# Patient Record
Sex: MALE | Race: WHITE | NOT HISPANIC OR LATINO | Employment: FULL TIME | URBAN - METROPOLITAN AREA
[De-identification: names, ages, dates, MRNs, and addresses within clinical notes are randomized per-mention and may not be internally consistent; named-entity substitution may affect disease eponyms.]

---

## 2017-01-09 ENCOUNTER — TELEPHONE (OUTPATIENT)
Dept: FAMILY MEDICINE | Facility: CLINIC | Age: 62
End: 2017-01-09

## 2017-01-09 DIAGNOSIS — F41.9 ANXIETY: ICD-10-CM

## 2017-01-09 DIAGNOSIS — L21.9 SEBORRHEA: Primary | ICD-10-CM

## 2017-01-09 NOTE — TELEPHONE ENCOUNTER
Reason for Call:  Medication or medication refill:    Do you use a [facility]?  Name of the pharmacy and phone number for the current request: [facility]    Name of the medication requested: Ketoconazole 2 % cream    Other request: Please call when approved    Can we leave a detailed message on this number? YES    Phone number patient can be reached at: Home number on file [phone] (home)    Best Time: Any    Call taken on 1/9/2017 at 10:33 AM by Lori Duarte

## 2017-01-09 NOTE — TELEPHONE ENCOUNTER
FLUoxetine 20 MG tablet     Last Written Prescription Date: 10/17/16  Last Fill Quantity: 90, # refills: 0  Last Office Visit with [facility] primary care provider:  12/06/16        Last PHQ-9 score on record=   PHQ-9 SCORE 12/6/2016   Total Score -   Total Score 2           ketoconazole (NIZORAL) 2 % cream      Last Written Prescription Date: 10/17/12  Last Fill Quantity: 30g,  # refills: 1   Last Office Visit with [facility], [facility] or [facility] prescribing provider: 12/06/16      [facility]

## 2017-01-11 NOTE — TELEPHONE ENCOUNTER
Routing refill request to provider for review/approval because:  Drug interaction warning  A break in medication  Sandy Ridley RN

## 2017-01-12 NOTE — TELEPHONE ENCOUNTER
Pt checking status as this has been since Monday  He is asking you to address this today please    Give him a call when done  [phone]

## 2017-01-13 RX ORDER — FLUOXETINE 20 MG/1
TABLET, FILM COATED ORAL
Qty: 90 TABLET | Refills: 1 | Status: SHIPPED | OUTPATIENT
Start: 2017-01-13 | End: 2017-05-11

## 2017-01-13 RX ORDER — KETOCONAZOLE 20 MG/G
CREAM TOPICAL 2 TIMES DAILY PRN
Qty: 30 G | Refills: 1 | Status: SHIPPED | OUTPATIENT
Start: 2017-01-13 | End: 2019-10-29

## 2017-01-28 ENCOUNTER — OFFICE VISIT (OUTPATIENT)
Dept: URGENT CARE | Facility: URGENT CARE | Age: 62
End: 2017-01-28
Payer: COMMERCIAL

## 2017-01-28 VITALS
WEIGHT: 216 LBS | OXYGEN SATURATION: 94 % | TEMPERATURE: 98.3 F | BODY MASS INDEX: 30.14 KG/M2 | DIASTOLIC BLOOD PRESSURE: 75 MMHG | SYSTOLIC BLOOD PRESSURE: 124 MMHG | HEART RATE: 70 BPM

## 2017-01-28 DIAGNOSIS — R22.0 FACIAL SWELLING: Primary | ICD-10-CM

## 2017-01-28 LAB
ERYTHROCYTE [DISTWIDTH] IN BLOOD BY AUTOMATED COUNT: 13.1 % (ref 10–15)
HCT VFR BLD AUTO: 42.8 % (ref 40–53)
HGB BLD-MCNC: 14.3 G/DL (ref 13.3–17.7)
MCH RBC QN AUTO: 29.7 PG (ref 26.5–33)
MCHC RBC AUTO-ENTMCNC: 33.4 G/DL (ref 31.5–36.5)
MCV RBC AUTO: 89 FL (ref 78–100)
PLATELET # BLD AUTO: 245 10E9/L (ref 150–450)
RBC # BLD AUTO: 4.82 10E12/L (ref 4.4–5.9)
WBC # BLD AUTO: 6.5 10E9/L (ref 4–11)

## 2017-01-28 PROCEDURE — 85027 COMPLETE CBC AUTOMATED: CPT | Performed by: PHYSICIAN ASSISTANT

## 2017-01-28 PROCEDURE — 99213 OFFICE O/P EST LOW 20 MIN: CPT | Performed by: PHYSICIAN ASSISTANT

## 2017-01-28 PROCEDURE — 86735 MUMPS ANTIBODY: CPT | Mod: 90 | Performed by: PHYSICIAN ASSISTANT

## 2017-01-28 PROCEDURE — 99000 SPECIMEN HANDLING OFFICE-LAB: CPT | Performed by: PHYSICIAN ASSISTANT

## 2017-01-28 PROCEDURE — 36415 COLL VENOUS BLD VENIPUNCTURE: CPT | Performed by: PHYSICIAN ASSISTANT

## 2017-01-28 RX ORDER — CEPHALEXIN 500 MG/1
500 CAPSULE ORAL 3 TIMES DAILY
Qty: 30 CAPSULE | Refills: 0 | Status: SHIPPED | OUTPATIENT
Start: 2017-01-28 | End: 2017-02-07

## 2017-01-28 NOTE — LETTER
[facility]  [address]  [place] [address]         February 12, 2017    Grey Markham  [address]  [facility] [address]              Dear Grey,    The results of your recent tests were normal.  Enclosed is a copy of the results.  It was a pleasure to see you at your last visit.      Results for orders placed or performed in visit on 01/28/17   CBC with platelets   Result Value Ref Range    WBC 6.5 4.0 - 11.0 10e9/L    RBC Count 4.82 4.4 - 5.9 10e12/L    Hemoglobin 14.3 13.3 - 17.7 g/dL    Hematocrit 42.8 40.0 - 53.0 %    MCV 89 78 - 100 fl    MCH 29.7 26.5 - 33.0 pg    MCHC 33.4 31.5 - 36.5 g/dL    RDW 13.1 10.0 - 15.0 %    Platelet Count 245 150 - 450 10e9/L   Mumps antibody IgM   Result Value Ref Range    Mumps LETICIA IGM 0.06    Mumps Antibody IgG   Result Value Ref Range    Mumps Antibody IgG  0.0 - 0.8 AI     <0.2  Negative, suggests no immunologic exposure.   Antibody index (AI) values reflect qualitative changes in antibody   concentration that cannot be directly associated with clinical condition or   disease state.               If you have any questions or concerns, please call myself or my nurse at [phone]. Thanks for choosing [facility] for your services. Have a great day!      Sincerely,      Leonie Livingston PA-C/tanika

## 2017-01-28 NOTE — NURSING NOTE
"Chief Complaint   Patient presents with     Swelling     on both sides of face on jaw line and since Thursday       Initial /75 mmHg  Pulse 70  Temp(Src) 98.3  F (36.8  C) (Oral)  Wt 216 lb (97.977 kg)  SpO2 94% Estimated body mass index is 30.14 kg/(m^2) as calculated from the following:    Height as of 12/6/16: 5' 11\" (1.803 m).    Weight as of this encounter: 216 lb (97.977 kg).  BP completed using cuff size: hiram Monk CMA    "

## 2017-01-28 NOTE — PROGRESS NOTES
SUBJECTIVE:                                                    Grey Markham is a 61 year old male who presents to clinic today for the following health issues:      Swelling on face      Duration: since thursday    Description (location/character/radiation): down jaw to mid neck    Intensity:  moderate    Accompanying signs and symptoms: stuffed up feeling and headache    History (similar episodes/previous evaluation): None    Precipitating or alleviating factors: None    Therapies tried and outcome: hot wash cloth- didn't help     Acute onset of bilateral facial swelling along the jaw line and cough. Does not know if he had mumps as a child. We recently has positive mumps case at this clinic. No hives. No new meds. Eating more oranges than usual. No tooth pain    No tongue swelling. No difficulty swallowing.    Allergies   Allergen Reactions     Gemfibrozil Diarrhea     No Clinical Screening - See Comments      All glc drops except travatan     Pilocarpine Hydrochloride      Eye drops     Timoptic [Timolol Maleate]      Eye drops only       Past Medical History   Diagnosis Date     DDD (degenerative disc disease), lumbar      L-4-L5, L5-S1     Oral herpes      Glaucoma      Hyperlipidemia LDL goal <100      hyper-TG, on Simv 80mg since at least 1/4/10     GERD (gastroesophageal reflux disease) 11/2007     no Rose's     Coronary atherosclerosis of native coronary artery 12/30/09     10%  mid-LAD lesion, Dr. Mendoza     Glaucoma          Current Outpatient Prescriptions on File Prior to Visit:  FLUoxetine 20 MG tablet TAKE 1 TABLET (20 MG) BY MOUTH DAILY FOR ANXIETY PREVENTION.   ketoconazole (NIZORAL) 2 % cream Apply topically 2 times daily as needed (to seborrhea of face)   atorvastatin (LIPITOR) 80 MG tablet TAKE ONE TABLET BY MOUTH ONE TIME DAILY FOR CHOLESTEROL   pantoprazole (PROTONIX) 40 MG EC tablet TAKE ONE TABLET BY MOUTH ONE TIME DAILY FOR REFLUX   sildenafil (REVATIO/VIAGRA) 20 MG tablet Take 1-5  tablets ( mg) by mouth daily as needed , as directed, 1-3 hours before intimacy. Maximum 1 dose per 24 hours. Never use with nitroglycerin, terazosin or doxazosin.   sildenafil (REVATIO/VIAGRA) 100 MG cap/tab Take 1/4 - 1 tab, as directed, 1-3 hours before intimacy. Maximum 1 dose per 24 hours. Never use with nitroglycerin, terazosin or doxazosin.   valACYclovir (VALTREX) 500 MG tablet TAKE ONE TABLET BY MOUTH EVERY TWELVE HOURS FOR 10 DOSES. START AT EARLIEST ONSET OF SYMPTOMS   sulindac (CLINORIL) 150 MG tablet Take 1 tablet by mouth 2 times daily as needed. for wrist pain. Take with food.   aspirin 81 MG tablet Take 1 tablet by mouth daily. *     No current facility-administered medications on file prior to visit.    Social History   Substance Use Topics     Smoking status: Never Smoker      Smokeless tobacco: Never Used     Alcohol Use: Yes      Comment: weekends       ROS:  Consitutional: As above  ENT: As above  Respiratory: As above    OBJECTIVE:  /75 mmHg  Pulse 70  Temp(Src) 98.3  F (36.8  C) (Oral)  Wt 216 lb (97.977 kg)  SpO2 94%  GENERAL APPEARANCE: healthy, alert and no distress  EYES: conjunctiva clear  EARS: No cerumen.   Ear canals w/o erythema, TM's intact w/o erythema.    NOSE/MOUTH: Nose and mouth without ulcers, erythema or lesions  SINUSES: No maxillary sinus tenderness.  THROAT: Mild erythema w/o tonsillar enlargement . No exudates  NECK: supple,  no lymphadenopathy  RESP: lungs clear to auscultation - no rales, rhonchi or wheezes  CV: regular rates and rhythm, normal S1 S2, no murmur noted  NEURO: awake, alert    Face- bilateral parotid area swelling. Mildly tender, warm to touch, some mild skin redness. No tongue or pharyngeal swelling.    ASSESSMENT: Well appearing.    ICD-10-CM    1. Facial swelling R22.0 CBC with platelets     Mumps antibody IgM     Mumps Antibody IgG     PLAN: Unclear etiology. Cellulitis vs. Allergic reaction vs mumps. OTC Benadryl  Lots of rest and  fluids.  RTC if any worsening symptoms or if not improving.    Leonie Livingston PA-C

## 2017-01-30 LAB — MUV IGG SER QL IA: NORMAL AI (ref 0–0.8)

## 2017-02-09 LAB — MUV IGM SER IA-ACNC: 0.06

## 2017-02-14 RX ORDER — FLUOXETINE 20 MG/1
TABLET, FILM COATED ORAL
Qty: 90 TABLET | Refills: 0 | OUTPATIENT
Start: 2017-02-14

## 2017-02-14 NOTE — TELEPHONE ENCOUNTER
Disp Refills Start End ELHAM   FLUoxetine 20 MG tablet 90 tablet 1 1/13/2017  No   Sig: TAKE 1 TABLET (20 MG) BY MOUTH DAILY FOR ANXIETY PREVENTION.     Confirmation was received from [facility].  Patient should have 5 month left of medication.  Request denied,  Too soon to fill.    Sandy Ridley RN

## 2017-04-13 DIAGNOSIS — B00.2 HERPETIC GINGIVOSTOMATITIS: ICD-10-CM

## 2017-04-13 NOTE — TELEPHONE ENCOUNTER
valACYclovir (VALTREX) 500 MG tablet     Last Written Prescription Date: 06/20/16  Last Fill Quantity: 20, # refills: 2  Last Office Visit with [facility], P or [facility] prescribing provider: 12/06/16        Creatinine   Date Value Ref Range Status   08/19/2014 1.09 0.66 - 1.25 mg/dL Final         [facility]

## 2017-04-14 RX ORDER — VALACYCLOVIR HYDROCHLORIDE 500 MG/1
TABLET, FILM COATED ORAL
Qty: 20 TABLET | Refills: 2 | Status: SHIPPED | OUTPATIENT
Start: 2017-04-14 | End: 2018-02-23

## 2017-04-14 NOTE — TELEPHONE ENCOUNTER
Routing refill request to provider for review/approval because:  Labs not current:  Creatinine  Sandy Ridley RN

## 2017-05-01 ENCOUNTER — MYC MEDICAL ADVICE (OUTPATIENT)
Dept: FAMILY MEDICINE | Facility: CLINIC | Age: 62
End: 2017-05-01

## 2017-05-01 DIAGNOSIS — N52.03 COMBINED ARTERIAL INSUFFICIENCY AND CORPORO-VENOUS OCCLUSIVE ERECTILE DYSFUNCTION: ICD-10-CM

## 2017-05-02 ENCOUNTER — MYC MEDICAL ADVICE (OUTPATIENT)
Dept: FAMILY MEDICINE | Facility: CLINIC | Age: 62
End: 2017-05-02

## 2017-05-04 RX ORDER — SILDENAFIL CITRATE 20 MG/1
TABLET ORAL
Qty: 100 TABLET | Refills: 5 | Status: SHIPPED | OUTPATIENT
Start: 2017-05-04 | End: 2018-06-01

## 2017-05-05 DIAGNOSIS — N52.03 COMBINED ARTERIAL INSUFFICIENCY AND CORPORO-VENOUS OCCLUSIVE ERECTILE DYSFUNCTION: ICD-10-CM

## 2017-05-11 ENCOUNTER — TELEPHONE (OUTPATIENT)
Dept: FAMILY MEDICINE | Facility: CLINIC | Age: 62
End: 2017-05-11

## 2017-05-11 DIAGNOSIS — F41.9 ANXIETY: ICD-10-CM

## 2017-05-11 NOTE — TELEPHONE ENCOUNTER
FLUoxetine 20 MG tablet     Last Written Prescription Date: 1/13/17  Last Fill Quantity: 90, # refills: 1  Last Office Visit with [facility] primary care provider:  12/6/16        Last PHQ-9 score on record=   PHQ-9 SCORE 12/6/2016   Total Score -   Total Score 2

## 2017-05-17 RX ORDER — FLUOXETINE 20 MG/1
TABLET, FILM COATED ORAL
Qty: 90 TABLET | Refills: 0 | Status: SHIPPED | OUTPATIENT
Start: 2017-05-17 | End: 2017-06-06

## 2017-05-17 NOTE — TELEPHONE ENCOUNTER
Called left msg for pt rx has been filled, pt is reminded he is due for a follow up next month, June. Pt may call our appt line to schedule the appt. Pt may want to schedule a ARYA if he has not had one this year.  Ramon Dickerson,  [facility]

## 2017-06-06 ENCOUNTER — OFFICE VISIT (OUTPATIENT)
Dept: FAMILY MEDICINE | Facility: CLINIC | Age: 62
End: 2017-06-06
Payer: COMMERCIAL

## 2017-06-06 VITALS
HEIGHT: 71 IN | OXYGEN SATURATION: 95 % | SYSTOLIC BLOOD PRESSURE: 135 MMHG | HEART RATE: 66 BPM | TEMPERATURE: 97.9 F | BODY MASS INDEX: 30.52 KG/M2 | DIASTOLIC BLOOD PRESSURE: 89 MMHG | WEIGHT: 218 LBS

## 2017-06-06 DIAGNOSIS — F41.9 ANXIETY: ICD-10-CM

## 2017-06-06 DIAGNOSIS — I25.10 ATHEROSCLEROSIS OF NATIVE CORONARY ARTERY OF NATIVE HEART WITHOUT ANGINA PECTORIS: ICD-10-CM

## 2017-06-06 DIAGNOSIS — E78.5 HYPERLIPIDEMIA LDL GOAL <100: ICD-10-CM

## 2017-06-06 DIAGNOSIS — Z00.00 ENCOUNTER FOR ROUTINE ADULT HEALTH EXAMINATION WITHOUT ABNORMAL FINDINGS: Primary | ICD-10-CM

## 2017-06-06 DIAGNOSIS — K21.9 GASTROESOPHAGEAL REFLUX DISEASE WITHOUT ESOPHAGITIS: ICD-10-CM

## 2017-06-06 LAB
ALT SERPL W P-5'-P-CCNC: 38 U/L (ref 0–70)
CHOLEST SERPL-MCNC: 176 MG/DL
HDLC SERPL-MCNC: 61 MG/DL
LDLC SERPL CALC-MCNC: 53 MG/DL
NONHDLC SERPL-MCNC: 115 MG/DL
TRIGL SERPL-MCNC: 312 MG/DL

## 2017-06-06 PROCEDURE — 36415 COLL VENOUS BLD VENIPUNCTURE: CPT | Performed by: FAMILY MEDICINE

## 2017-06-06 PROCEDURE — 80061 LIPID PANEL: CPT | Performed by: FAMILY MEDICINE

## 2017-06-06 PROCEDURE — 99396 PREV VISIT EST AGE 40-64: CPT | Performed by: FAMILY MEDICINE

## 2017-06-06 PROCEDURE — 84460 ALANINE AMINO (ALT) (SGPT): CPT | Performed by: FAMILY MEDICINE

## 2017-06-06 RX ORDER — ATORVASTATIN CALCIUM 80 MG/1
TABLET, FILM COATED ORAL
Qty: 90 TABLET | Refills: 1 | Status: SHIPPED | OUTPATIENT
Start: 2017-06-06 | End: 2017-11-27

## 2017-06-06 RX ORDER — FLUOXETINE 20 MG/1
TABLET, FILM COATED ORAL
Qty: 90 TABLET | Refills: 1 | Status: SHIPPED | OUTPATIENT
Start: 2017-06-06 | End: 2018-06-01

## 2017-06-06 NOTE — NURSING NOTE
"Chief Complaint   Patient presents with     Physical       Initial /89 (BP Location: Left arm, Patient Position: Chair, Cuff Size: Adult Regular)  Pulse 66  Temp 97.9  F (36.6  C) (Oral)  Ht 5' 11\" (1.803 m)  Wt 218 lb (98.9 kg)  SpO2 95%  BMI 30.4 kg/m2 Estimated body mass index is 30.4 kg/(m^2) as calculated from the following:    Height as of this encounter: 5' 11\" (1.803 m).    Weight as of this encounter: 218 lb (98.9 kg).  Medication Reconciliation: complete     Pa Ryan Valadez MA      "

## 2017-06-06 NOTE — MR AVS SNAPSHOT
After Visit Summary   6/6/2017    Grey Markham    MRN: [id number]           Patient Information     Date Of Birth          1955        Visit Information        Provider Department      6/6/2017 7:20 AM Navin Euceda MD [facility]        Today's Diagnoses     Encounter for routine adult health examination without abnormal findings    -  1    Atherosclerosis of native coronary artery of native heart without angina pectoris        Hyperlipidemia LDL goal <100        Gastroesophageal reflux disease without esophagitis        Elevated prostate specific antigen (PSA)        Combined arterial insufficiency and corporo-venous occlusive erectile dysfunction        Anxiety          Care Instructions      Preventive Health Recommendations  Male Ages 50 - 64    Yearly exam:             See your health care provider every year in order to  o   Review health changes.   o   Discuss preventive care.    o   Review your medicines if your doctor has prescribed any.     Have a cholesterol test every 5 years, or more frequently if you are at risk for high cholesterol/heart disease.     Have a diabetes test (fasting glucose) every three years. If you are at risk for diabetes, you should have this test more often.     Have a colonoscopy at age 50, or have a yearly FIT test (stool test). These exams will check for colon cancer.      Talk with your health care provider about whether or not a prostate cancer screening test (PSA) is right for you.    You should be tested each year for STDs (sexually transmitted diseases), if you re at risk.     Shots: Get a flu shot each year. Get a tetanus shot every 10 years.     Nutrition:    Eat at least 5 servings of fruits and vegetables daily.     Eat whole-grain bread, whole-wheat pasta and brown rice instead of white grains and rice.     Talk to your provider about Calcium and Vitamin D.     Lifestyle    Exercise for at least 150 minutes a week (30  minutes a day, 5 days a week). This will help you control your weight and prevent disease.     Limit alcohol to one drink per day.     No smoking.     Wear sunscreen to prevent skin cancer.     See your dentist every six months for an exam and cleaning.     See your eye doctor every 1 to 2 years.  This summary includes the important diagnoses, test, medications and other important parts of your medical history.  Below are a few good we sites you can use to learn more about these.     Www.[employer].org : Up to date and easily searchable information on multiple topics.  Www.[employer].Techpoint/Pharmacy/c_539084.asp : [facility] $4.99 medications  Www.BabyFirstTV.gov : medication info, interactive tutorials, watch real surgeries online  Www.familydoctor.org : good info from the Academy of Family Physicians  Www.Luv Rink.Bandcamp : good info from the [facility]  Www.cdc.gov : public health info, travel advisories, epidemics (H1N1)  Www.aap.org : children's health info, normal development, vaccinations  Www.health.[facility].[place].us : [place] dept of heatlh, public health issues in [place], N1N1    Based on your medical history and these are the current health maintenance or preventive care services that you are due for (some may have been done at this visit:)  There are no preventive care reminders to display for this patient.  =================================================================================  Normal Values   Blood pressure  <140/90 for most adults    <130/80 for some chronic diseases (ask your care team about yours)    BMI (body mass index)  18.5-25 kg/m2 (based on height and weight)     Thank you for visiting [facility]    Normal or non-critical lab and imaging results will be communicated to you by MyChart, letter or phone within 7 days.  If you do not hear from us within 10 days, please call the clinic. If you have a critical or abnormal lab result, we will notify you by phone as soon as possible.      If you have any questions regarding your visit please contact:     Team Comfort:   Clinic Hours Telephone Number   Dr. Navin Cardenas   7am-5pm  Monday - Friday [phone]  [facility]   Pharmacy 8am-8pm Monday-Thursday      8am-6pm Friday  9am-5pm Saturday-Sunday [phone]   Urgent Care 11am-8pm Monday-Friday        9am-5pm Saturday-Sunday [phone]     After hours, weekend or if you need to make an appointment with your primary provider please call [phone].   After Hours nurse advise: call [facility]: [phone]    Medication Refills:  Call your pharmacy and they will forward the refill to us. Please allow 3 business days for your refills to be completed.    Use [email or web address] (secure email communication and access to your chart) to send your primary care provider a message or make an appointment. Ask someone on your Team how to sign up for [email or web address]. To log on to Pixelligent or for more information in Fin[employer] please visit the website at [email or web address].  As of October 8, 2013, all password changes, disabled accounts, or ID changes in [email or web address] will be done by our Access Services Department.   If you need help with your account or password, call: [phone]. Clinic staff no longer has the ability to change passwords.             Follow-ups after your visit        Additional Services     Can Do Program Referral       CAN DO is different from other healthy lifestyle and weight loss programs. CAN DO personal health coaches support you in clarifying what you want for yourself and your well-being. Together we create a roadmap to guide your journey to thriving well-being - one step at a time - for lasting success. Working under the leadership of a physician medical director, our health coaching team includes a doctorate -level prepared expert in health management, a physician and an exercise specialist.  We support you in identifying what is important to you, setting meaningful goals and addressing barriers to success. Together we celestina your progress over time. CAN DO has helped hundreds reach their goals: from people who just want to be healthier and enjoy more energy, to those who want to lose or gain weight.            GASTROENTEROLOGY ADULT REF PROCEDURE ONLY       Last Lab Result: Creatinine (mg/dL)       Date                     Value                 08/19/2014               1.09             ----------  Body mass index is 30.4 kg/(m^2).     Needed:  No  Language:  English    Patient will be contacted to schedule procedure.     Please be aware that coverage of these services is subject to the terms and limitations of your health insurance plan.  Call member services at your health plan with any benefit or coverage questions.  Any procedures must be performed at a [place] facility OR coordinated by your clinic's referral office.    Please bring the following with you to your appointment:    (1) Any X-Rays, CTs or MRIs which have been performed.  Contact the facility where they were done to arrange for  prior to your scheduled appointment.    (2) List of current medications   (3) This referral request   (4) Any documents/labs given to you for this referral                  Follow-up notes from your care team     Return in about 6 months (around 12/6/2017) for cholesterol, lab tests, recheck medications.      Who to contact     If you have questions or need follow up information about today's clinic visit or your schedule please contact [facility] [place] directly at [phone].  Normal or non-critical lab and imaging results will be communicated to you by MyChart, letter or phone within 4 business days after the clinic has received the results. If you do not hear from us within 7 days, please contact the clinic through MyChart or phone. If you have a critical or abnormal lab  "result, we will notify you by phone as soon as possible.  Submit refill requests through goodideazs or call your pharmacy and they will forward the refill request to us. Please allow 3 business days for your refill to be completed.          Additional Information About Your Visit        [employer] Information     goodideazs gives you secure access to your electronic health record. If you see a primary care provider, you can also send messages to your care team and make appointments. If you have questions, please call your primary care clinic.  If you do not have a primary care provider, please call [phone] and they will assist you.        Care EveryWhere ID     This is your Care EveryWhere ID. This could be used by other organizations to access your [place] medical records  [id number]        Your Vitals Were     Pulse Temperature Height Pulse Oximetry BMI (Body Mass Index)       66 97.9  F (36.6  C) (Oral) 5' 11\" (1.803 m) 95% 30.4 kg/m2        Blood Pressure from Last 3 Encounters:   06/06/17 135/89   01/28/17 124/75   12/06/16 130/78    Weight from Last 3 Encounters:   06/06/17 218 lb (98.9 kg)   01/28/17 216 lb (98 kg)   12/06/16 220 lb (99.8 kg)              We Performed the Following     ALT     Can Do Program Referral     GASTROENTEROLOGY ADULT REF PROCEDURE ONLY     Lipid panel reflex to direct LDL          Today's Medication Changes          These changes are accurate as of: 6/6/17  8:15 AM.  If you have any questions, ask your nurse or doctor.               These medicines have changed or have updated prescriptions.        Dose/Directions    FLUoxetine 20 MG tablet   This may have changed:  See the new instructions.   Used for:  Anxiety   Changed by:  Navin Euceda MD        TAKE ONE TABLET BY MOUTH ONE TIME DAILY FOR ANXIETY PREVENTION   Quantity:  90 tablet   Refills:  1            Where to get your medicines      These medications were sent to [place] [address] [place] [facility] [place] [place] - [address] [facility]  " [address] [place] [address]     Phone:  [phone]     atorvastatin 80 MG tablet    FLUoxetine 20 MG tablet                Primary Care Provider Office Phone # Fax #    Navin Euceda [phone]       [facility] [address]  [facility] [address]        Thank you!     Thank you for choosing [facility]  for your care. Our goal is always to provide you with excellent care. Hearing back from our patients is one way we can continue to improve our services. Please take a few minutes to complete the written survey that you may receive in the mail after your visit with us. Thank you!             Your Updated Medication List - Protect others around you: Learn how to safely use, store and throw away your medicines at www.disposemymeds.org.          This list is accurate as of: 6/6/17  8:15 AM.  Always use your most recent med list.                   Brand Name Dispense Instructions for use    aspirin 81 MG tablet     100 tablet    Take 1 tablet by mouth daily. *       atorvastatin 80 MG tablet    LIPITOR    90 tablet    TAKE ONE TABLET BY MOUTH ONE TIME DAILY FOR CHOLESTEROL       FLUoxetine 20 MG tablet     90 tablet    TAKE ONE TABLET BY MOUTH ONE TIME DAILY FOR ANXIETY PREVENTION       ketoconazole 2 % cream    NIZORAL    30 g    Apply topically 2 times daily as needed (to seborrhea of face)       pantoprazole 40 MG EC tablet    PROTONIX    90 tablet    TAKE ONE TABLET BY MOUTH ONE TIME DAILY FOR REFLUX       sildenafil 20 MG tablet    REVATIO/VIAGRA    100 tablet    1-5 tabs ( mg) as dir.,1-3 h before intimacy, daily as needed. Max 1 dose per 24 h. Never use w/ nitroglycerin, terazosin or doxazosin       sulindac 150 MG tablet    CLINORIL    180 tablet    Take 1 tablet by mouth 2 times daily as needed. for wrist pain. Take with food.       valACYclovir 500 MG tablet    VALTREX    20 tablet    TAKE ONE TABLET BY MOUTH EVERY TWELVE HOURS FOR 5  DAYS. START AT EARLIEST ONSET OF SYMPTOMS

## 2017-06-06 NOTE — PROGRESS NOTES
SUBJECTIVE:     CC: Grey Markham is an 61 year old male who presents for preventative health visit.     Healthy Habits:    Do you get at least three servings of calcium containing foods daily (dairy, green leafy vegetables, etc.)? yes    Amount of exercise or daily activities, outside of work: 5 day(s) per week    Problems taking medications regularly No    Medication side effects: No    Have you had an eye exam in the past two years? yes    Do you see a dentist twice per year? yes    Do you have sleep apnea, excessive snoring or daytime drowsiness?yes snoring    Patient returns today with stomach acid problems. He has feelings of burping but finds himself unable to. Onset: 4 days. He did notice some chest pain last night, however has had no other episodes. Patient takes reflux medicine. 10 + years since last checking of upper endoscopy.     Daily exercise: Patient does weights at home, rarely does cardio.   -------------------------------------    Today's PHQ-2 Score:   PHQ-2 ( 1999 [employer]) 6/6/2017 12/6/2016   Q1: Little interest or pleasure in doing things 0 0   Q2: Feeling down, depressed or hopeless 0 0   PHQ-2 Score 0 0       Abuse: Current or Past(Physical, Sexual or Emotional)- No  Do you feel safe in your environment - Yes    Social History   Substance Use Topics     Smoking status: Never Smoker     Smokeless tobacco: Never Used     Alcohol use Yes      Comment: weekends     The patient does not drink >3 drinks per day nor >7 drinks per week.    Last PSA:   PSA   Date Value Ref Range Status   05/17/2016 11.56 (H) 0 - 4 ug/L Final       Recent Labs   Lab Test  12/06/16   0837  05/17/16   0751  08/21/15   0731  08/19/14   0822   CHOL  166  140  177  209*   HDL  64  49  49  56   LDL  51  30  59  Cannot estimate LDL when triglyceride exceeds 400 mg/dL  91   TRIG  257*  306*  347*  501*   CHOLHDLRATIO   --    --   3.6  3.7   NHDL  102  91   --    --          ROS:  C: NEGATIVE for fever, chills, change in  "weight  I: NEGATIVE for worrisome rashes, moles or lesions  E: NEGATIVE for vision changes or irritation  ENT: NEGATIVE for ear, mouth and throat problems  R: NEGATIVE for significant cough or SOB  CV: NEGATIVE for chest pain, palpitations or peripheral edema  GI: NEGATIVE for nausea or change in bowel habits. See GI discussion above.   male: negative for dysuria, hematuria, decreased urinary stream, erectile dysfunction, urethral discharge  M: NEGATIVE for significant arthralgias or myalgia  N: NEGATIVE for weakness, dizziness or paresthesias  P: NEGATIVE for changes in mood or affect    Problem list, Medication list, Allergies, and Medical/Social/Surgical histories reviewed in [facility] and updated as appropriate.    This document serves as a record of the services and decisions personally performed and made by Navin Euceda MD. It was created on his behalf by Senia Hinson, a trained medical scribe. The creation of this document is based the provider's statements to the medical scribe.    Scribe Senia Hinson 8:23 AM 6/6/2017     OBJECTIVE:     /89 (BP Location: Left arm, Patient Position: Chair, Cuff Size: Adult Regular)  Pulse 66  Temp 97.9  F (36.6  C) (Oral)  Ht 1.803 m (5' 11\")  Wt 98.9 kg (218 lb)  SpO2 95%  BMI 30.4 kg/m2  EXAM:  GENERAL: healthy, alert and no distress  EYES: Eyes grossly normal to inspection, PERRL and conjunctivae and sclerae normal  HENT: ear canals and TM's normal, nose and mouth without ulcers or lesions  NECK: no adenopathy, no asymmetry, masses, or scars and thyroid normal to palpation  RESP: lungs clear to auscultation - no rales, rhonchi or wheezes  CV: regular rate and rhythm, normal S1 S2, no S3 or S4, no murmur, click or rub, no peripheral edema and peripheral pulses strong  ABDOMEN: soft, nontender, no hepatosplenomegaly, no masses and bowel sounds normal   (male): normal male genitalia without lesions or urethral discharge, no hernia  MS: no gross musculoskeletal " "defects noted, no edema  SKIN: no suspicious lesions or rashes  NEURO: Normal strength and tone, mentation intact and speech normal  PSYCH: mentation appears normal, affect normal/bright        ASSESSMENT/PLAN:     (Z00.00) Encounter for routine adult health examination without abnormal findings  (primary encounter diagnosis)  Comment: Negative screening exam. Up to date on preventive services.  Plan: Can Do Program Referral        Follow up in 1 year.    (I25.10) Atherosclerosis of native coronary artery of native heart without angina pectoris  Comment: Clinically stable.  Plan: atorvastatin (LIPITOR) 80 MG tablet, Lipid         panel reflex to direct LDL, ALT    (E78.5) Hyperlipidemia LDL goal <100  Comment: Fasting.  Plan: atorvastatin (LIPITOR) 80 MG tablet, Lipid         panel reflex to direct LDL, ALT        Follow up in 6 months.    (K21.9) Gastroesophageal reflux disease without esophagitis  Comment: Worsening of symptoms and lower esophageal dysphagia despite maximal GERD treatment.  Plan: GASTROENTEROLOGY ADULT REF PROCEDURE ONLY    (F41.9) Anxiety  Comment: Prescription update.  Plan: FLUoxetine 20 MG tablet        Monitor periodically.      COUNSELING:  Reviewed preventive health counseling, as reflected in patient instructions     reports that he has never smoked. He has never used smokeless tobacco.    Estimated body mass index is 30.4 kg/(m^2) as calculated from the following:    Height as of this encounter: 1.803 m (5' 11\").    Weight as of this encounter: 98.9 kg (218 lb).   Weight management plan: Discussed healthy diet and exercise guidelines and patient will follow up in 12 months in clinic to re-evaluate. Patient admits he needs to resume the cardio component of his exercise regimen.    Counseling Resources:  ATP IV Guidelines  Pooled Cohorts Equation Calculator  FRAX Risk Assessment  ICSI Preventive Guidelines  Dietary Guidelines for Americans, 2010  USDA's MyPlate  ASA Prophylaxis  Lung CA " Screening    The information in this document, created by a scribe for me, accurately reflects the services I personally performed and the decisions made by me. I have reviewed and approved this document for accuracy.      Navin Euceda MD  [facility]

## 2017-06-06 NOTE — PATIENT INSTRUCTIONS
Preventive Health Recommendations  Male Ages 50   64    Yearly exam:             See your health care provider every year in order to  o   Review health changes.   o   Discuss preventive care.    o   Review your medicines if your doctor has prescribed any.     Have a cholesterol test every 5 years, or more frequently if you are at risk for high cholesterol/heart disease.     Have a diabetes test (fasting glucose) every three years. If you are at risk for diabetes, you should have this test more often.     Have a colonoscopy at age 50, or have a yearly FIT test (stool test). These exams will check for colon cancer.      Talk with your health care provider about whether or not a prostate cancer screening test (PSA) is right for you.    You should be tested each year for STDs (sexually transmitted diseases), if you re at risk.     Shots: Get a flu shot each year. Get a tetanus shot every 10 years.     Nutrition:    Eat at least 5 servings of fruits and vegetables daily.     Eat whole-grain bread, whole-wheat pasta and brown rice instead of white grains and rice.     Talk to your provider about Calcium and Vitamin D.     Lifestyle    Exercise for at least 150 minutes a week (30 minutes a day, 5 days a week). This will help you control your weight and prevent disease.     Limit alcohol to one drink per day.     No smoking.     Wear sunscreen to prevent skin cancer.     See your dentist every six months for an exam and cleaning.     See your eye doctor every 1 to 2 years.  This summary includes the important diagnoses, test, medications and other important parts of your medical history.  Below are a few good we sites you can use to learn more about these.     Www.[employer].org : Up to date and easily searchable information on multiple topics.  Www.[employer].org/Pharmacy/c_539084.asp : [employer] [facility] $4.99 medications  Www.Mindscape.gov : medication info, interactive tutorials, watch real surgeries  online  Www.familydoctor.org : good info from the Academy of Family Physicians  Www.mayoclinic.com : good info from the [facility]  Www.cdc.gov : public health info, travel advisories, epidemics (H1N1)  Www.aap.org : children's health info, normal development, vaccinations  Www.health.[facility].[place].us : [place] dept of [facility], public health issues in [place], N1N1    Based on your medical history and these are the current health maintenance or preventive care services that you are due for (some may have been done at this visit:)  There are no preventive care reminders to display for this patient.  =================================================================================  Normal Values   Blood pressure  <140/90 for most adults    <130/80 for some chronic diseases (ask your care team about yours)    BMI (body mass index)  18.5-25 kg/m2 (based on height and weight)     Thank you for visiting [facility]    Normal or non-critical lab and imaging results will be communicated to you by MyChart, letter or phone within 7 days.  If you do not hear from us within 10 days, please call the clinic. If you have a critical or abnormal lab result, we will notify you by phone as soon as possible.     If you have any questions regarding your visit please contact:     Team Comfort:   Clinic Hours Telephone Number   Dr. Navin Cardenas   7am-5pm  Monday - Friday [phone]  [facility]   Pharmacy 8am-8pm Monday-Thursday      8am-6pm Friday  9am-5pm Saturday-Sunday [phone]   Urgent Care 11am-8pm Monday-Friday        9am-5pm Saturday-Sunday [phone]     After hours, weekend or if you need to make an appointment with your primary provider please call [phone].   After Hours nurse advise: call [facility]: [phone]    Medication Refills:  Call your pharmacy and they will forward the refill to us. Please allow 3 business  days for your refills to be completed.    Use Timetovisit (secure email communication and access to your chart) to send your primary care provider a message or make an appointment. Ask someone on your Team how to sign up for [employer]etovisit. To log on to [employer] or for more information in [employer] please visit the website at [email or web address].  As of October 8, 2013, all password changes, disabled accounts, or ID changes in Timetovisit/MyHealth will be done by our Access Services Department.   If you need help with your account or password, call: [phone]. Clinic staff no longer has the ability to change passwords.

## 2017-06-14 ENCOUNTER — SURGERY (OUTPATIENT)
Age: 62
End: 2017-06-14

## 2017-06-14 ENCOUNTER — HOSPITAL ENCOUNTER (OUTPATIENT)
Facility: AMBULATORY SURGERY CENTER | Age: 62
Discharge: HOME | End: 2017-06-14
Attending: SURGERY | Admitting: SURGERY
Payer: COMMERCIAL

## 2017-06-14 VITALS
DIASTOLIC BLOOD PRESSURE: 69 MMHG | SYSTOLIC BLOOD PRESSURE: 131 MMHG | TEMPERATURE: 97.4 F | OXYGEN SATURATION: 96 % | HEIGHT: 71 IN | WEIGHT: 218 LBS | BODY MASS INDEX: 30.52 KG/M2 | RESPIRATION RATE: 16 BRPM

## 2017-06-14 LAB — UPPER GI ENDOSCOPY: NORMAL

## 2017-06-14 PROCEDURE — 43239 EGD BIOPSY SINGLE/MULTIPLE: CPT | Performed by: SURGERY

## 2017-06-14 PROCEDURE — 88305 TISSUE EXAM BY PATHOLOGIST: CPT | Performed by: SURGERY

## 2017-06-14 PROCEDURE — G8918 PT W/O PREOP ORDER IV AB PRO: HCPCS

## 2017-06-14 PROCEDURE — G8907 PT DOC NO EVENTS ON DISCHARG: HCPCS

## 2017-06-14 PROCEDURE — 43239 EGD BIOPSY SINGLE/MULTIPLE: CPT

## 2017-06-14 PROCEDURE — 88342 IMHCHEM/IMCYTCHM 1ST ANTB: CPT | Performed by: SURGERY

## 2017-06-14 RX ORDER — LIDOCAINE 40 MG/G
CREAM TOPICAL
Status: DISCONTINUED | OUTPATIENT
Start: 2017-06-14 | End: 2017-06-15 | Stop reason: HOSPADM

## 2017-06-14 RX ORDER — ONDANSETRON 2 MG/ML
4 INJECTION INTRAMUSCULAR; INTRAVENOUS
Status: DISCONTINUED | OUTPATIENT
Start: 2017-06-14 | End: 2017-06-15 | Stop reason: HOSPADM

## 2017-06-14 RX ORDER — FENTANYL CITRATE 50 UG/ML
INJECTION, SOLUTION INTRAMUSCULAR; INTRAVENOUS PRN
Status: DISCONTINUED | OUTPATIENT
Start: 2017-06-14 | End: 2017-06-14 | Stop reason: HOSPADM

## 2017-06-14 RX ADMIN — FENTANYL CITRATE 100 MCG: 50 INJECTION, SOLUTION INTRAMUSCULAR; INTRAVENOUS at 10:27

## 2017-06-15 NOTE — PROGRESS NOTES
Dear Grey,     I am covering for the provider who ordered this test.  Your test results are attached.   Your endoscopy shows a small hiatal hernia.  The physician performing the procedure will let you know about the biopsy results when they process.    Please notify me via TheFriendMail or contact the clinic at [phone] if you have any questions.    Kylah Bowen MD

## 2017-06-16 LAB — COPATH REPORT: NORMAL

## 2017-06-18 DIAGNOSIS — E78.5 HYPERLIPIDEMIA LDL GOAL <100: ICD-10-CM

## 2017-06-18 DIAGNOSIS — I25.10 ATHEROSCLEROSIS OF NATIVE CORONARY ARTERY OF NATIVE HEART WITHOUT ANGINA PECTORIS: ICD-10-CM

## 2017-06-18 RX ORDER — ATORVASTATIN CALCIUM 80 MG/1
TABLET, FILM COATED ORAL
Qty: 90 TABLET | Refills: 1 | Status: CANCELLED | OUTPATIENT
Start: 2017-06-18

## 2017-06-18 NOTE — TELEPHONE ENCOUNTER
atorvastatin (LIPITOR) 80 MG tablet     Last Written Prescription Date: 6/6/17  Last Fill Quantity: 90, # refills: 1  Last Office Visit with [facility], [facility] or [facility] prescribing provider: 12/6/16       Lab Results   Component Value Date    CHOL 176 06/06/2017     Lab Results   Component Value Date    HDL 61 06/06/2017     Lab Results   Component Value Date    LDL 53 06/06/2017     Lab Results   Component Value Date    TRIG 312 06/06/2017     Lab Results   Component Value Date    CHOLHDLRATIO 3.6 08/21/2015         [facility]

## 2017-06-20 NOTE — TELEPHONE ENCOUNTER
The following prescription was sent to [place], [place]:    atorvastatin (LIPITOR) 80 MG tablet 90 tablet 1 6/6/2017  No   Sig: TAKE ONE TABLET BY MOUTH ONE TIME DAILY FOR CHOLESTEROL   Class: E-Prescribe   Order: [id number]   E-Prescribing Status: Receipt confirmed by pharmacy (6/6/2017  8:14 AM CDT)       Request denied.  Too soon to fill.    Sandy Ridley RN

## 2017-08-17 DIAGNOSIS — F41.9 ANXIETY: ICD-10-CM

## 2017-08-17 RX ORDER — FLUOXETINE 20 MG/1
TABLET, FILM COATED ORAL
Qty: 90 TABLET | Refills: 0 | OUTPATIENT
Start: 2017-08-17

## 2017-08-17 NOTE — TELEPHONE ENCOUNTER
The following prescription was sent to [facility] [place]:      FLUoxetine 20 MG tablet 90 tablet 1 6/6/2017  No   Sig: TAKE ONE TABLET BY MOUTH ONE TIME DAILY FOR ANXIETY PREVENTION   Class: E-Prescribe   Order: [id number]   E-Prescribing Status: Receipt confirmed by pharmacy (6/6/2017  8:14 AM CDT)       Request denied.  Too soon to fill.  Sandy Ridley RN

## 2017-09-12 DIAGNOSIS — F41.9 ANXIETY: ICD-10-CM

## 2017-09-12 DIAGNOSIS — I25.10 ATHEROSCLEROSIS OF NATIVE CORONARY ARTERY OF NATIVE HEART WITHOUT ANGINA PECTORIS: ICD-10-CM

## 2017-09-12 DIAGNOSIS — E78.5 HYPERLIPIDEMIA LDL GOAL <100: ICD-10-CM

## 2017-09-13 NOTE — TELEPHONE ENCOUNTER
These should both have 1 refill available at this pharmacy.      Ángela Vicente  [facility]

## 2017-09-14 RX ORDER — ATORVASTATIN CALCIUM 80 MG/1
TABLET, FILM COATED ORAL
Qty: 90 TABLET | Refills: 1 | OUTPATIENT
Start: 2017-09-14

## 2017-09-14 RX ORDER — FLUOXETINE 20 MG/1
TABLET, FILM COATED ORAL
Qty: 90 TABLET | Refills: 0 | OUTPATIENT
Start: 2017-09-14

## 2017-11-27 DIAGNOSIS — I25.10 ATHEROSCLEROSIS OF NATIVE CORONARY ARTERY OF NATIVE HEART WITHOUT ANGINA PECTORIS: ICD-10-CM

## 2017-11-27 DIAGNOSIS — F41.9 ANXIETY: ICD-10-CM

## 2017-11-27 DIAGNOSIS — E78.5 HYPERLIPIDEMIA LDL GOAL <100: ICD-10-CM

## 2017-11-27 DIAGNOSIS — K21.9 GASTROESOPHAGEAL REFLUX DISEASE WITHOUT ESOPHAGITIS: ICD-10-CM

## 2017-11-30 RX ORDER — PANTOPRAZOLE SODIUM 40 MG/1
TABLET, DELAYED RELEASE ORAL
Qty: 90 TABLET | Refills: 1 | Status: SHIPPED | OUTPATIENT
Start: 2017-11-30 | End: 2018-05-23

## 2017-11-30 RX ORDER — ATORVASTATIN CALCIUM 80 MG/1
TABLET, FILM COATED ORAL
Qty: 90 TABLET | Refills: 1 | Status: SHIPPED | OUTPATIENT
Start: 2017-11-30 | End: 2018-05-23

## 2017-11-30 RX ORDER — FLUOXETINE 20 MG/1
TABLET, FILM COATED ORAL
Qty: 90 TABLET | Refills: 1 | Status: SHIPPED | OUTPATIENT
Start: 2017-11-30 | End: 2018-05-23

## 2017-11-30 NOTE — TELEPHONE ENCOUNTER
Reason for Call:  Medication or medication refill:    Do you use a [facility]?  Name of the pharmacy and phone number for the current request:      Name of the medication requested:     Other request: patient is totally out of his medications, Please call patient when this has been taken care of.    Can we leave a detailed message on this number? YES    Phone number patient can be reached at: Other phone number:    Grey Markham (Self) [phone] (W)         Best Time: any    Call taken on 11/30/2017 at 11:47 AM by Macy Travis

## 2017-12-19 ENCOUNTER — TELEPHONE (OUTPATIENT)
Dept: FAMILY MEDICINE | Facility: CLINIC | Age: 62
End: 2017-12-19

## 2017-12-19 NOTE — TELEPHONE ENCOUNTER
Panel Management Review        Last Office Visit with this department:     Fail List measure:       Patient is due/failing the following:   COLONOSCOPY    Action needed:   none    Type of outreach:    Sent [employer] message.    Questions for provider review:    None                                                                                                                                    Brittany Melton MA

## 2018-01-20 ENCOUNTER — HEALTH MAINTENANCE LETTER (OUTPATIENT)
Age: 63
End: 2018-01-20

## 2018-02-23 DIAGNOSIS — B00.2 HERPETIC GINGIVOSTOMATITIS: ICD-10-CM

## 2018-02-27 RX ORDER — VALACYCLOVIR HYDROCHLORIDE 500 MG/1
TABLET, FILM COATED ORAL
Qty: 20 TABLET | Refills: 2 | Status: SHIPPED | OUTPATIENT
Start: 2018-02-27 | End: 2019-06-19

## 2018-04-12 ENCOUNTER — TELEPHONE (OUTPATIENT)
Dept: FAMILY MEDICINE | Facility: CLINIC | Age: 63
End: 2018-04-12

## 2018-04-12 NOTE — TELEPHONE ENCOUNTER
Panel Management Review      BP Readings from Last 1 Encounters:   06/14/17 131/69      Last Office Visit with this department: 12/19/2017    Fail List measure: colonoscopy      Patient is due/failing the following:   COLONOSCOPY    Action needed:   None.    Type of outreach:    Sent letter.    Questions for provider review:    None                                                                                                                                    Elvira Tavarez MA      Chart routed to  .

## 2018-04-12 NOTE — LETTER
[address] [address]  [phone]  Dept: [phone]      April 12, 2018      Grey Markham  [address] [facility] [address]        Dear Grey Markham,     At [facility] we care about your health and are committed to providing quality patient care.    Which includes staying current on preventive cancer screenings.  You can increase your chances of finding and treating cancers through regular screenings.      Our records indicate you may be due for the following preventive screening(s):    Colonoscopy    Colonoscopy is recommended every ten years for everyone age 50 and older. We strongly urge our patient's to consider having a colonoscopy done, which is the best screening test available and only needs to be done every 10 years if normal. If you are unwilling or unable to have a colonoscopy then we recommend the annual stool testing for blood. This test is called a FIT test and it looks for blood in the stool.     To schedule an appointment or discuss this screening further, you may contact us by phone at the [facility] at [phone] or online through the patient [email or web address] @ [email or web address]    If you have had any of the screenings listed above at another facility, please call us so that we may update your chart.      Your partners in health,      Quality Committee at [facility]

## 2018-05-23 DIAGNOSIS — K21.9 GASTROESOPHAGEAL REFLUX DISEASE WITHOUT ESOPHAGITIS: ICD-10-CM

## 2018-05-23 DIAGNOSIS — I25.10 ATHEROSCLEROSIS OF NATIVE CORONARY ARTERY OF NATIVE HEART WITHOUT ANGINA PECTORIS: ICD-10-CM

## 2018-05-23 DIAGNOSIS — F41.9 ANXIETY: ICD-10-CM

## 2018-05-23 DIAGNOSIS — E78.5 HYPERLIPIDEMIA LDL GOAL <100: ICD-10-CM

## 2018-05-23 NOTE — TELEPHONE ENCOUNTER
"Requested Prescriptions   Pending Prescriptions Disp Refills     atorvastatin (LIPITOR) 80 MG tablet [Pharmacy Med Name: ATORVASTATIN 80 MG TABLET]  Last Written Prescription Date:  11/30/17  Last Fill Quantity: 90,  # refills: 1   Last Office Visit with [facility] or [facility] prescribing provider:  06/06/17   Future Office Visit:    90 tablet 1     Sig: TAKE ONE TABLET BY MOUTH ONE TIME DAILY FOR CHOLESTEROL    Statins Protocol Passed    5/23/2018  1:18 AM       Passed - LDL on file in past 12 months    Recent Labs   Lab Test  06/06/17   0822   LDL  53            Passed - No abnormal creatine kinase in past 12 months    No lab results found.            Passed - Recent (12 mo) or future (30 days) visit within the authorizing provider's specialty    Patient had office visit in the last 12 months or has a visit in the next 30 days with authorizing provider or within the authorizing provider's specialty.  See \"Patient Info\" tab in inbasket, or \"Choose Columns\" in Meds & Orders section of the refill encounter.           Passed - Patient is age 18 or older        pantoprazole (PROTONIX) 40 MG EC tablet [Pharmacy Med Name: PANTOPRAZOLE SOD DR 40 MG TAB]  Last Written Prescription Date:  11/30/17  Last Fill Quantity: 90,  # refills: 1   Last Office Visit with [facility] or [facility] prescribing provider:  06/06/17   Future Office Visit:    90 tablet 1     Sig: TAKE 1 TABLET BY MOUTH ONE TIME DAILY FOR REFLUX    PPI Protocol Passed    5/23/2018  1:18 AM       Passed - Not on Clopidogrel (unless Pantoprazole ordered)       Passed - No diagnosis of osteoporosis on record       Passed - Recent (12 mo) or future (30 days) visit within the authorizing provider's specialty    Patient had office visit in the last 12 months or has a visit in the next 30 days with authorizing provider or within the authorizing provider's specialty.  See \"Patient Info\" tab in inbasket, or \"Choose Columns\" in Meds & Orders section of the refill encounter.  " "         Passed - Patient is age 18 or older        FLUoxetine 20 MG tablet [Pharmacy Med Name: FLUOXETINE HCL 20 MG TABLET]  Last Written Prescription Date:  11/30/17  Last Fill Quantity: 90,  # refills: 1   Last Office Visit with [facility], [facility] or [facility] prescribing provider:  06/06/17   Future Office Visit:    90 tablet 1     Sig: TAKE ONE TABLET BY MOUTH ONE TIME DAILY FOR ANXIETY PREVENTION    SSRIs Protocol Passed    5/23/2018  1:18 AM       Passed - Recent (12 mo) or future (30 days) visit within the authorizing provider's specialty    Patient had office visit in the last 12 months or has a visit in the next 30 days with authorizing provider or within the authorizing provider's specialty.  See \"Patient Info\" tab in inbasket, or \"Choose Columns\" in Meds & Orders section of the refill encounter.           Passed - Patient is age 18 or older          "

## 2018-05-24 RX ORDER — ATORVASTATIN CALCIUM 80 MG/1
TABLET, FILM COATED ORAL
Qty: 30 TABLET | Refills: 0 | Status: SHIPPED | OUTPATIENT
Start: 2018-05-24 | End: 2018-06-01

## 2018-05-24 RX ORDER — FLUOXETINE 20 MG/1
TABLET, FILM COATED ORAL
Qty: 30 TABLET | Refills: 0 | Status: SHIPPED | OUTPATIENT
Start: 2018-05-24 | End: 2018-06-01

## 2018-05-24 RX ORDER — PANTOPRAZOLE SODIUM 40 MG/1
TABLET, DELAYED RELEASE ORAL
Qty: 30 TABLET | Refills: 0 | Status: SHIPPED | OUTPATIENT
Start: 2018-05-24 | End: 2018-06-01

## 2018-05-24 NOTE — TELEPHONE ENCOUNTER
Medication is being filled for 1 time refill only due to:  Patient needs to be seen because due for yearly physical visit in June.     TC to please call patient and schedule yearly physical appointment. Thank you.    Saroj John RN, BSN

## 2018-05-25 DIAGNOSIS — K21.9 GASTROESOPHAGEAL REFLUX DISEASE WITHOUT ESOPHAGITIS: ICD-10-CM

## 2018-05-25 DIAGNOSIS — I25.10 ATHEROSCLEROSIS OF NATIVE CORONARY ARTERY OF NATIVE HEART WITHOUT ANGINA PECTORIS: ICD-10-CM

## 2018-05-25 DIAGNOSIS — E78.5 HYPERLIPIDEMIA LDL GOAL <100: ICD-10-CM

## 2018-05-25 DIAGNOSIS — F41.9 ANXIETY: ICD-10-CM

## 2018-05-25 RX ORDER — FLUOXETINE 20 MG/1
TABLET, FILM COATED ORAL
Qty: 30 TABLET | Refills: 0 | OUTPATIENT
Start: 2018-05-25

## 2018-05-25 RX ORDER — ATORVASTATIN CALCIUM 80 MG/1
TABLET, FILM COATED ORAL
Qty: 90 TABLET | Refills: 1 | OUTPATIENT
Start: 2018-05-25

## 2018-05-25 RX ORDER — PANTOPRAZOLE SODIUM 40 MG/1
TABLET, DELAYED RELEASE ORAL
Qty: 90 TABLET | Refills: 1 | OUTPATIENT
Start: 2018-05-25

## 2018-05-25 NOTE — TELEPHONE ENCOUNTER
Medications were filled for 30 days on 5/24/18. Duplicate request as patient needs to be seen in June.     Saroj John RN, BSN

## 2018-05-30 DIAGNOSIS — N52.03 COMBINED ARTERIAL INSUFFICIENCY AND CORPORO-VENOUS OCCLUSIVE ERECTILE DYSFUNCTION: ICD-10-CM

## 2018-05-30 RX ORDER — SILDENAFIL CITRATE 20 MG/1
TABLET ORAL
Qty: 100 TABLET | Refills: 5 | Status: CANCELLED | OUTPATIENT
Start: 2018-05-30

## 2018-05-30 NOTE — TELEPHONE ENCOUNTER
"Requested Prescriptions   Pending Prescriptions Disp Refills     sildenafil (REVATIO) 20 MG tablet [Pharmacy Med Name: SILDENAFIL CITRATE 20MG TABS]    Last Written Prescription Date:  5/4/17  Last Fill Quantity: 100,  # refills: 5   Last Office Visit with [facility] or [facility] prescribing provider:  6/6/17   Future Office Visit:    Next 5 appointments (look out 90 days)     Jun 01, 2018  7:20 AM CDT   PHYSICAL with Navin Euceda MD   [facility]    [address] [place] [id number] [phone]                  100 tablet 5     Sig: TAKE 1 TO 5 TABLETS BY MOUTH 1 TO 3 HOURS BEFORE SEXUAL ACTIVITY ONCE DAILY AS NEEDED (MAX OF 5 PER 24 HOURS)    Erectile Dysfuction Protocol Passed    5/30/2018 11:01 AM       Passed - Absence of nitrates on medication list       Passed - Absence of Alpha Blockers on Med list       Passed - Recent (12 mo) or future (30 days) visit within the authorizing provider's specialty    Patient had office visit in the last 12 months or has a visit in the next 30 days with authorizing provider or within the authorizing provider's specialty.  See \"Patient Info\" tab in inbasket, or \"Choose Columns\" in Meds & Orders section of the refill encounter.           Passed - Patient is age 18 or older              Christopher Faarax  [facility]  "

## 2018-06-01 ENCOUNTER — OFFICE VISIT (OUTPATIENT)
Dept: FAMILY MEDICINE | Facility: CLINIC | Age: 63
End: 2018-06-01
Payer: COMMERCIAL

## 2018-06-01 VITALS
DIASTOLIC BLOOD PRESSURE: 78 MMHG | OXYGEN SATURATION: 97 % | BODY MASS INDEX: 31.05 KG/M2 | WEIGHT: 221.8 LBS | HEIGHT: 71 IN | HEART RATE: 73 BPM | SYSTOLIC BLOOD PRESSURE: 134 MMHG | TEMPERATURE: 98.3 F

## 2018-06-01 DIAGNOSIS — R73.09 ELEVATED GLUCOSE: ICD-10-CM

## 2018-06-01 DIAGNOSIS — Z12.11 SCREEN FOR COLON CANCER: ICD-10-CM

## 2018-06-01 DIAGNOSIS — Z11.4 SCREENING FOR HIV (HUMAN IMMUNODEFICIENCY VIRUS): ICD-10-CM

## 2018-06-01 DIAGNOSIS — N52.03 COMBINED ARTERIAL INSUFFICIENCY AND CORPORO-VENOUS OCCLUSIVE ERECTILE DYSFUNCTION: ICD-10-CM

## 2018-06-01 DIAGNOSIS — E78.5 HYPERLIPIDEMIA LDL GOAL <100: ICD-10-CM

## 2018-06-01 DIAGNOSIS — Z23 NEED FOR ZOSTER VACCINE: ICD-10-CM

## 2018-06-01 DIAGNOSIS — K21.9 GASTROESOPHAGEAL REFLUX DISEASE WITHOUT ESOPHAGITIS: ICD-10-CM

## 2018-06-01 DIAGNOSIS — F41.9 ANXIETY: ICD-10-CM

## 2018-06-01 DIAGNOSIS — I25.10 ATHEROSCLEROSIS OF NATIVE CORONARY ARTERY OF NATIVE HEART WITHOUT ANGINA PECTORIS: ICD-10-CM

## 2018-06-01 DIAGNOSIS — Z00.00 ENCOUNTER FOR ROUTINE ADULT HEALTH EXAMINATION WITHOUT ABNORMAL FINDINGS: Primary | ICD-10-CM

## 2018-06-01 LAB
ALT SERPL W P-5'-P-CCNC: 36 U/L (ref 0–70)
CHOLEST SERPL-MCNC: 160 MG/DL
GLUCOSE SERPL-MCNC: 124 MG/DL (ref 70–99)
HDLC SERPL-MCNC: 57 MG/DL
LDLC SERPL CALC-MCNC: 60 MG/DL
NONHDLC SERPL-MCNC: 103 MG/DL
TRIGL SERPL-MCNC: 213 MG/DL

## 2018-06-01 PROCEDURE — 87389 HIV-1 AG W/HIV-1&-2 AB AG IA: CPT | Performed by: FAMILY MEDICINE

## 2018-06-01 PROCEDURE — 99396 PREV VISIT EST AGE 40-64: CPT | Mod: 25 | Performed by: FAMILY MEDICINE

## 2018-06-01 PROCEDURE — 90750 HZV VACC RECOMBINANT IM: CPT | Performed by: FAMILY MEDICINE

## 2018-06-01 PROCEDURE — 36415 COLL VENOUS BLD VENIPUNCTURE: CPT | Performed by: FAMILY MEDICINE

## 2018-06-01 PROCEDURE — 82947 ASSAY GLUCOSE BLOOD QUANT: CPT | Performed by: FAMILY MEDICINE

## 2018-06-01 PROCEDURE — 90471 IMMUNIZATION ADMIN: CPT | Performed by: FAMILY MEDICINE

## 2018-06-01 PROCEDURE — 84460 ALANINE AMINO (ALT) (SGPT): CPT | Performed by: FAMILY MEDICINE

## 2018-06-01 PROCEDURE — 80061 LIPID PANEL: CPT | Performed by: FAMILY MEDICINE

## 2018-06-01 RX ORDER — SILDENAFIL CITRATE 20 MG/1
TABLET ORAL
Qty: 100 TABLET | Refills: 5 | Status: SHIPPED | OUTPATIENT
Start: 2018-06-01 | End: 2019-06-18

## 2018-06-01 RX ORDER — FLUOXETINE 20 MG/1
TABLET, FILM COATED ORAL
Qty: 90 TABLET | Refills: 1 | Status: SHIPPED | OUTPATIENT
Start: 2018-06-01 | End: 2018-12-06

## 2018-06-01 RX ORDER — PANTOPRAZOLE SODIUM 40 MG/1
TABLET, DELAYED RELEASE ORAL
Qty: 90 TABLET | Refills: 3 | Status: SHIPPED | OUTPATIENT
Start: 2018-06-01 | End: 2019-06-14

## 2018-06-01 RX ORDER — ATORVASTATIN CALCIUM 80 MG/1
TABLET, FILM COATED ORAL
Qty: 90 TABLET | Refills: 1 | Status: SHIPPED | OUTPATIENT
Start: 2018-06-01 | End: 2018-12-06

## 2018-06-01 NOTE — PROGRESS NOTES
SUBJECTIVE:   CC: Grey Markham is an 62 year old male who presents for preventative health visit.     Healthy Habits:    Do you get at least three servings of calcium containing foods daily (dairy, green leafy vegetables, etc.)? yes    Amount of exercise or daily activities, outside of work: 4-5 day(s) per week    Problems taking medications regularly No    Medication side effects: No    Have you had an eye exam in the past two years? yes    Do you see a dentist twice per year? yes    Do you have sleep apnea, excessive snoring or daytime drowsiness?snoring     Would like refill of Viagra, request sent on 5/30/18    Today's PHQ-2 Score:   PHQ-2 ( 1999 Pfizer) 6/1/2018 6/6/2017   Q1: Little interest or pleasure in doing things 0 0   Q2: Feeling down, depressed or hopeless 0 0   PHQ-2 Score 0 0       Abuse: Current or Past(Physical, Sexual or Emotional)- No  Do you feel safe in your environment - Yes    Social History   Substance Use Topics     Smoking status: Never Smoker     Smokeless tobacco: Never Used     Alcohol use Yes      Comment: weekends      If you drink alcohol do you typically have >3 drinks per day or >7 drinks per week? Yes - AUDIT SCORE:     No flowsheet data found.                      Last PSA:   PSA   Date Value Ref Range Status   05/17/2016 11.56 (H) 0 - 4 ug/L Final     Reviewed orders with patient. Reviewed health maintenance and updated orders accordingly - Yes    Reviewed and updated as needed this visit by clinical staff  Tobacco  Allergies  Meds  Problems  Med Hx  Surg Hx  Fam Hx  Soc Hx          Reviewed and updated as needed this visit by Provider          ROS:  CONSTITUTIONAL: NEGATIVE for fever, chills, change in weight  INTEGUMENTARY/SKIN: NEGATIVE for worrisome rashes, moles or lesions  EYES: NEGATIVE for vision changes or irritation  ENT: NEGATIVE for ear, mouth and throat problems  RESP: NEGATIVE for significant cough or SOB  CV: NEGATIVE for chest pain, palpitations or  "peripheral edema  GI: NEGATIVE for nausea, abdominal pain, heartburn, or change in bowel habits   male: negative for dysuria, hematuria, decreased urinary stream, erectile dysfunction, urethral discharge  MUSCULOSKELETAL: NEGATIVE for significant arthralgias or myalgia  NEURO: NEGATIVE for weakness, dizziness or paresthesias  PSYCHIATRIC: NEGATIVE for changes in mood or affect    This document serves as a record of the services and decisions personally performed and made by Dr. Euceda. It was created on his behalf by Hermes Aly, a trained medical scribe. The creation of this document is based the provider's statements to the medical scribe.  Hermes Aly June 1, 2018 8:06 AM      OBJECTIVE:   /78 (BP Location: Left arm, Patient Position: Sitting, Cuff Size: Adult Large)  Pulse 73  Temp 98.3  F (36.8  C) (Oral)  Ht 5' 11\" (1.803 m)  Wt 221 lb 12.8 oz (100.6 kg)  SpO2 97%  BMI 30.93 kg/m2  EXAM:  GENERAL: healthy, alert and no distress  EYES: Eyes grossly normal to inspection, PERRL and conjunctivae and sclerae normal  HENT: ear canals and TM's normal, nose and mouth without ulcers or lesions  NECK: no adenopathy, no asymmetry, masses, or scars and thyroid normal to palpation  RESP: lungs clear to auscultation - no rales, rhonchi or wheezes  CV: regular rate and rhythm, normal S1 S2, no S3 or S4, no murmur, click or rub, no peripheral edema and peripheral pulses strong  ABDOMEN: soft, nontender, no hepatosplenomegaly, no masses and bowel sounds normal   (male): normal male genitalia without lesions or urethral discharge, no hernia  MS: no gross musculoskeletal defects noted, no edema  SKIN: no suspicious lesions or rashes  NEURO: Normal strength and tone, mentation intact and speech normal  PSYCH: mentation appears normal, affect normal/bright    ASSESSMENT/PLAN:   (Z00.00) Encounter for routine adult health examination without abnormal findings  (primary encounter diagnosis)  Comment: Negative " "screening exam; up-to-date on preventive services.   Plan: HIV Screening, Glucose, HC ZOSTER VACCINE         RECOMBINANT ADJUVANTED IM NJX  Follow up in 1 year           (I25.10) Atherosclerosis of native coronary artery of native heart without angina pectoris  (E78.5) Hyperlipidemia LDL goal <100  Comment: fasting, historically at goal, on a high-intensity statin   Plan: Lipid panel reflex to direct LDL Fasting,         atorvastatin (LIPITOR) 80 MG tablet, ALT        Follow up in 6 months     (K21.9) Gastroesophageal reflux disease without esophagitis  Comment: refill request  Plan: pantoprazole (PROTONIX) 40 MG EC tablet          (F41.9) Anxiety  Comment: refil request  Plan: FLUoxetine 20 MG tablet          (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  Comment: refill request  Plan: sildenafil (REVATIO) 20 MG tablet          (Z12.11) Screen for colon cancer  Comment: overdue for colonoscopy  Plan: GASTROENTEROLOGY ADULT REF PROCEDURE ONLY [facility] [phone]          (Z11.4) Screening for HIV (human immunodeficiency virus)  Comment: indications for screening discussed with the patient   Plan: HIV Screening          (Z23) Need for zoster vaccine  Comment:   Plan: HC ZOSTER VACCINE RECOMBINANT ADJUVANTED IM NJX           COUNSELING:  Reviewed preventive health counseling, as reflected in patient instructions    BP Screening:   Last 3 BP Readings:    BP Readings from Last 3 Encounters:   06/01/18 134/78   06/14/17 131/69   06/06/17 135/89       The following was recommended to the patient:  Re-screen BP within a year and recommended lifestyle modifications   reports that he has never smoked. He has never used smokeless tobacco.    Estimated body mass index is 30.93 kg/(m^2) as calculated from the following:    Height as of this encounter: 5' 11\" (1.803 m).    Weight as of this encounter: 221 lb 12.8 oz (100.6 kg).   Weight management plan: Discussed " healthy diet and exercise guidelines and patient will follow up in 12 months in clinic to re-evaluate. exercise recommendations as listed in the AVS.    Counseling Resources:  ATP IV Guidelines  Pooled Cohorts Equation Calculator  FRAX Risk Assessment  ICSI Preventive Guidelines  Dietary Guidelines for Americans, 2010  USDA's MyPlate  ASA Prophylaxis  Lung CA Screening    The information in this document, created by the medical scribe for me, accurately reflects the services I personally performed and the decisions made by me. I have reviewed and approved this document for accuracy prior to leaving the patient care area.  MD Navin Chapa MD  [facility]

## 2018-06-01 NOTE — PATIENT INSTRUCTIONS
Preventive Health Recommendations  Male Ages 50   64    Yearly exam:             See your health care provider every year in order to  o   Review health changes.   o   Discuss preventive care.    o   Review your medicines if your doctor has prescribed any.     Have a cholesterol test every 5 years, or more frequently if you are at risk for high cholesterol/heart disease.     Have a diabetes test (fasting glucose) every three years. If you are at risk for diabetes, you should have this test more often.     Have a colonoscopy at age 50, or have a yearly FIT test (stool test). These exams will check for colon cancer.      Talk with your health care provider about whether or not a prostate cancer screening test (PSA) is right for you.    You should be tested each year for STDs (sexually transmitted diseases), if you re at risk.     Shots: Get a flu shot each year. Get a tetanus shot every 10 years.     Nutrition:    Eat at least 5 servings of fruits and vegetables daily.     Eat whole-grain bread, whole-wheat pasta and brown rice instead of white grains and rice.     Talk to your provider about Calcium and Vitamin D.     Lifestyle    Exercise for at least 150 minutes a week (30 minutes a day, 5 days a week). This will help you control your weight and prevent disease.     Limit alcohol to one drink per day.     No smoking.     Wear sunscreen to prevent skin cancer.     See your dentist every six months for an exam and cleaning.     See your eye doctor every 1 to 2 years.          At [facility], we strive to deliver an exceptional experience to you, every time we see you.  If you receive a survey in the mail, please send us back your thoughts. We really do value your feedback.    Based on your medical history, these are the current health maintenance/preventive care services that you are due for (some may have been done at this visit.)  Health Maintenance Due   Topic Date Due     HIV SCREEN (SYSTEM  ASSIGNED)  11/13/1974     COLONOSCOPY Q10 YR  11/12/2017     LIPID MONITORING Q1 YEAR  06/06/2018       Suggested websites for health information:  Www.fairview.org : Up to date and easily searchable information on multiple topics.  Www.medlineplus.gov : medication info, interactive tutorials, watch real surgeries online  Www.familydoctor.org : good info from the Academy of Family Physicians  Www.cdc.gov : public health info, travel advisories, epidemics (H1N1)  Www.aap.org : children's health info, normal development, vaccinations  Www.health.[facility].[place].us : [place] dept of health, public health issues in [place], N1N1    Your care team:                            Family Medicine Internal Medicine   MD Kuldeep Fernandez MD Shantel Branch-Fleming, MD Katya Georgiev PA-C Megan Hill, APRN CNP    Allan Cardona MD Pediatrics   Manuel Winchester, RYAN Morales, CNP MD Blaire Carver APRN CNP   MD Tayler Meyer MD Deborah Mielke, MD Kim Thein, APRN [facility]      Clinic hours: Monday - Thursday 7 am-7 pm; Fridays 7 am-5 pm.   Urgent care: Monday - Friday 11 am-9 pm; Saturday and Sunday 9 am-5 pm.  Pharmacy : Monday -Thursday 8 am-8 pm; Friday 8 am-6 pm; Saturday and Sunday 9 am-5 pm.     Clinic: [phone]   Pharmacy: [phone]

## 2018-06-01 NOTE — MR AVS SNAPSHOT
After Visit Summary   6/1/2018    Grey Markham    MRN: [id number]           Patient Information     Date Of Birth          1955        Visit Information        Provider Department      6/1/2018 7:20 AM Navin Euceda MD [facility]        Today's Diagnoses     Encounter for routine adult health examination without abnormal findings    -  1    Screen for colon cancer        Screening for HIV (human immunodeficiency virus)        Hyperlipidemia LDL goal <100        Atherosclerosis of native coronary artery of native heart without angina pectoris        Anxiety        Gastroesophageal reflux disease without esophagitis        Combined arterial insufficiency and corporo-venous occlusive erectile dysfunction        Need for zoster vaccine          Care Instructions      Preventive Health Recommendations  Male Ages 50 - 64    Yearly exam:             See your health care provider every year in order to  o   Review health changes.   o   Discuss preventive care.    o   Review your medicines if your doctor has prescribed any.     Have a cholesterol test every 5 years, or more frequently if you are at risk for high cholesterol/heart disease.     Have a diabetes test (fasting glucose) every three years. If you are at risk for diabetes, you should have this test more often.     Have a colonoscopy at age 50, or have a yearly FIT test (stool test). These exams will check for colon cancer.      Talk with your health care provider about whether or not a prostate cancer screening test (PSA) is right for you.    You should be tested each year for STDs (sexually transmitted diseases), if you re at risk.     Shots: Get a flu shot each year. Get a tetanus shot every 10 years.     Nutrition:    Eat at least 5 servings of fruits and vegetables daily.     Eat whole-grain bread, whole-wheat pasta and brown rice instead of white grains and rice.     Talk to your provider about Calcium and Vitamin  D.     Lifestyle    Exercise for at least 150 minutes a week (30 minutes a day, 5 days a week). This will help you control your weight and prevent disease.     Limit alcohol to one drink per day.     No smoking.     Wear sunscreen to prevent skin cancer.     See your dentist every six months for an exam and cleaning.     See your eye doctor every 1 to 2 years.          At [facility], we strive to deliver an exceptional experience to you, every time we see you.  If you receive a survey in the mail, please send us back your thoughts. We really do value your feedback.    Based on your medical history, these are the current health maintenance/preventive care services that you are due for (some may have been done at this visit.)  Health Maintenance Due   Topic Date Due     HIV SCREEN (SYSTEM ASSIGNED)  11/13/1974     COLONOSCOPY Q10 YR  11/12/2017     LIPID MONITORING Q1 YEAR  06/06/2018       Suggested websites for health information:  Www.[employer] : Up to date and easily searchable information on multiple topics.  Www.medlineplus.gov : medication info, interactive tutorials, watch real surgeries online  Www.familydoctor.org : good info from the Academy of Family Physicians  Www.cdc.gov : public health info, travel advisories, epidemics (H1N1)  Www.aap.org : children's health info, normal development, vaccinations  Www.health.[facility].[place].us : [place] dept of health, public health issues in [place], N1N1    Your care team:                            Family Medicine Internal Medicine   MD Kuldeep Fernandez MD Shantel Branch-Fleming, MD Katya Georgiev PA-C Megan Hill, APRN CNP Nam Ho, MD Pediatrics   RYAN Sherwood, MD Blaire Thomas APRN CNP   MD Tayler Meyer MD Deborah Mielke, MD Kim Thein, APRN [facility]      Clinic hours: Monday - Thursday 7 am-7 pm; Fridays 7 am-5 pm.   Urgent care: Monday - Friday 11 am-9 pm; Saturday and  Sunday 9 am-5 pm.  Pharmacy : Monday -Thursday 8 am-8 pm; Friday 8 am-6 pm; Saturday and Sunday 9 am-5 pm.     Clinic: [phone]   Pharmacy: [phone]              Follow-ups after your visit        Additional Services     GASTROENTEROLOGY ADULT REF PROCEDURE ONLY [facility] [phone]       Last Lab Result: Creatinine (mg/dL)       Date                     Value                 08/19/2014               1.09             ----------  There is no height or weight on file to calculate BMI.     Needed:  No  Language:  English    Patient will be contacted to schedule the colonoscopy, or please call the Specialty Scheduling Line [phone].     Please be aware that coverage of these services is subject to the terms and limitations of your health insurance plan.  Call member services at your health plan with any benefit or coverage questions.  Any procedures must be performed at a [place] facility OR coordinated by your clinic's referral office.    Please bring the following with you to your appointment:    (1) Any X-Rays, CTs or MRIs which have been performed.  Contact the facility where they were done to arrange for  prior to your scheduled appointment.    (2) List of current medications   (3) This referral request   (4) Any documents/labs given to you for this referral                  Follow-up notes from your care team     Return in about 6 months (around 11/16/2018) for cholesterol, update immunizations.      Who to contact     If you have questions or need follow up information about today's clinic visit or your schedule please contact [facility] [place] directly at [phone].  Normal or non-critical lab and imaging results will be communicated to you by MyChart, letter or phone within 4 business days after the clinic has received the results. If you do not hear from us within 7 days, please contact the clinic through MyChart or phone. If you have a critical or  "abnormal lab result, we will notify you by phone as soon as possible.  Submit refill requests through [employer] or call your pharmacy and they will forward the refill request to us. Please allow 3 business days for your refill to be completed.          Additional Information About Your Visit        [employer] Information     [employer] gives you secure access to your electronic health record. If you see a primary care provider, you can also send messages to your care team and make appointments. If you have questions, please call your primary care clinic.  If you do not have a primary care provider, please call [phone] and they will assist you.        Care EveryWhere ID     This is your Care EveryWhere ID. This could be used by other organizations to access your [place] medical records  [id number]        Your Vitals Were     Pulse Temperature Height Pulse Oximetry BMI (Body Mass Index)       73 98.3  F (36.8  C) (Oral) 5' 11\" (1.803 m) 97% 30.93 kg/m2        Blood Pressure from Last 3 Encounters:   06/01/18 134/78   06/14/17 131/69   06/06/17 135/89    Weight from Last 3 Encounters:   06/01/18 221 lb 12.8 oz (100.6 kg)   06/08/17 218 lb (98.9 kg)   06/06/17 218 lb (98.9 kg)              We Performed the Following     ALT     GASTROENTEROLOGY ADULT REF PROCEDURE ONLY [facility] [phone]     Glucose     HC ZOSTER VACCINE RECOMBINANT ADJUVANTED IM NJX     HIV Screening     Lipid panel reflex to direct LDL Fasting          Where to get your medicines      These medications were sent to [place] [address] [place] [facility] - [place] - [address]  [address] [address]     Phone:  [phone]     atorvastatin 80 MG tablet    FLUoxetine 20 MG tablet    pantoprazole 40 MG EC tablet         These medications were sent to [facility] - [place] - [address]  [address], [place] [address]     Phone:  [phone]     sildenafil 20 MG tablet          Primary " Care Provider Office Phone # Fax #    Navin Euceda [phone]       [address]  [facility] [address]        Equal Access to Services     [place] MENARD : Hadii aad ku hadasho Soomaali, waaxda luqadaha, qaybta kaalmada adeegyada, waxjohanna méndezn jannet haywood laAnamounika obregon. So [facility] [phone].    ATENCIÓN: Si habla español, tiene a awad disposición servicios gratuitos de asistencia lingüística. Llame al [phone].    We comply with applicable federal civil rights laws and [place] laws. We do not discriminate on the basis of race, color, national origin, age, disability, sex, sexual orientation, or gender identity.            Thank you!     Thank you for choosing [facility]  for your care. Our goal is always to provide you with excellent care. Hearing back from our patients is one way we can continue to improve our services. Please take a few minutes to complete the written survey that you may receive in the mail after your visit with us. Thank you!             Your Updated Medication List - Protect others around you: Learn how to safely use, store and throw away your medicines at www.disposemymeds.org.          This list is accurate as of 6/1/18  8:05 AM.  Always use your most recent med list.                   Brand Name Dispense Instructions for use Diagnosis    aspirin 81 MG tablet     100 tablet    Take 1 tablet by mouth daily. *        atorvastatin 80 MG tablet    LIPITOR    90 tablet    TAKE ONE TABLET BY MOUTH ONE TIME DAILY FOR CHOLESTEROL    Hyperlipidemia LDL goal <100, Atherosclerosis of native coronary artery of native heart without angina pectoris       FLUoxetine 20 MG tablet     90 tablet    TAKE ONE TABLET BY MOUTH ONE TIME DAILY FOR ANXIETY PREVENTION    Anxiety       ketoconazole 2 % cream    NIZORAL    30 g    Apply topically 2 times daily as needed (to seborrhea of face)    Seborrhea       pantoprazole 40 MG EC tablet    PROTONIX    90 tablet     TAKE 1 TABLET BY MOUTH ONE TIME DAILY FOR REFLUX    Gastroesophageal reflux disease without esophagitis       sildenafil 20 MG tablet    REVATIO    100 tablet    1-5 tabs ( mg) as dir.,1-3 h before intimacy, daily as needed. Max 1 dose per 24 h. Never use w/ nitroglycerin, terazosin or doxazosin    Combined arterial insufficiency and corporo-venous occlusive erectile dysfunction       sulindac 150 MG tablet    CLINORIL    180 tablet    Take 1 tablet by mouth 2 times daily as needed. for wrist pain. Take with food.    Right wrist pain       valACYclovir 500 MG tablet    VALTREX    20 tablet    TAKE ONE TABLET BY MOUTH EVERY TWELVE HOURS FOR 5 DAYS. START AT EARLIEST ONSET OF SYMPTOMS    Herpetic gingivostomatitis

## 2018-06-04 LAB — HIV 1+2 AB+HIV1 P24 AG SERPL QL IA: NONREACTIVE

## 2018-07-09 ENCOUNTER — SURGERY (OUTPATIENT)
Age: 63
End: 2018-07-09

## 2018-07-09 ENCOUNTER — HOSPITAL ENCOUNTER (OUTPATIENT)
Facility: AMBULATORY SURGERY CENTER | Age: 63
Discharge: HOME | End: 2018-07-09
Attending: SURGERY | Admitting: SURGERY
Payer: COMMERCIAL

## 2018-07-09 VITALS
WEIGHT: 216 LBS | BODY MASS INDEX: 30.24 KG/M2 | OXYGEN SATURATION: 98 % | SYSTOLIC BLOOD PRESSURE: 125 MMHG | TEMPERATURE: 97.5 F | RESPIRATION RATE: 18 BRPM | DIASTOLIC BLOOD PRESSURE: 82 MMHG | HEIGHT: 71 IN

## 2018-07-09 LAB — COLONOSCOPY: NORMAL

## 2018-07-09 PROCEDURE — 99153 MOD SED SAME PHYS/QHP EA: CPT | Mod: 59 | Performed by: SURGERY

## 2018-07-09 PROCEDURE — G0121 COLON CA SCRN NOT HI RSK IND: HCPCS | Performed by: SURGERY

## 2018-07-09 PROCEDURE — G8907 PT DOC NO EVENTS ON DISCHARG: HCPCS

## 2018-07-09 PROCEDURE — G8918 PT W/O PREOP ORDER IV AB PRO: HCPCS

## 2018-07-09 PROCEDURE — 99152 MOD SED SAME PHYS/QHP 5/>YRS: CPT | Mod: 59 | Performed by: SURGERY

## 2018-07-09 PROCEDURE — 45378 DIAGNOSTIC COLONOSCOPY: CPT

## 2018-07-09 RX ORDER — ONDANSETRON 2 MG/ML
4 INJECTION INTRAMUSCULAR; INTRAVENOUS
Status: DISCONTINUED | OUTPATIENT
Start: 2018-07-09 | End: 2018-07-10 | Stop reason: HOSPADM

## 2018-07-09 RX ORDER — LIDOCAINE 40 MG/G
CREAM TOPICAL
Status: DISCONTINUED | OUTPATIENT
Start: 2018-07-09 | End: 2018-07-10 | Stop reason: HOSPADM

## 2018-07-09 RX ORDER — FENTANYL CITRATE 50 UG/ML
INJECTION, SOLUTION INTRAMUSCULAR; INTRAVENOUS PRN
Status: DISCONTINUED | OUTPATIENT
Start: 2018-07-09 | End: 2018-07-09 | Stop reason: HOSPADM

## 2018-07-09 RX ADMIN — FENTANYL CITRATE 50 MCG: 50 INJECTION, SOLUTION INTRAMUSCULAR; INTRAVENOUS at 10:23

## 2018-07-09 RX ADMIN — FENTANYL CITRATE 100 MCG: 50 INJECTION, SOLUTION INTRAMUSCULAR; INTRAVENOUS at 10:16

## 2018-08-07 ENCOUNTER — MYC MEDICAL ADVICE (OUTPATIENT)
Dept: FAMILY MEDICINE | Facility: CLINIC | Age: 63
End: 2018-08-07

## 2018-09-02 NOTE — TELEPHONE ENCOUNTER
"Requested Prescriptions   Pending Prescriptions Disp Refills     valACYclovir (VALTREX) 500 MG tablet [Pharmacy Med Name: VALACYCLOVIR  MG TABLET]  Last Written Prescription Date:  04/14/17  Last Fill Quantity: 20,  # refills: 2   Last Office Visit with [facility], [facility] or [facility] prescribing provider:  06/06/17   Future Office Visit:    20 tablet 2     Sig: TAKE ONE TABLET BY MOUTH EVERY TWELVE HOURS FOR 5 DAYS. START AT EARLIEST ONSET OF SYMPTOMS    Antivirals for Herpes Protocol Failed    2/23/2018 11:41 AM       Failed - Normal serum creatinine on file in past 12 months    Recent Labs   Lab Test  08/19/14   0822   CR  1.09            Passed - Patient is age 12 or older       Passed - Recent or future visit with authorizing provider's specialty    Patient had office visit in the last year or has a visit in the next 30 days with authorizing provider.  See \"Patient Info\" tab in inbasket, or \"Choose Columns\" in Meds & Orders section of the refill encounter.               " pcp

## 2018-11-08 ENCOUNTER — ALLIED HEALTH/NURSE VISIT (OUTPATIENT)
Dept: NURSING | Facility: CLINIC | Age: 63
End: 2018-11-08
Payer: COMMERCIAL

## 2018-11-08 DIAGNOSIS — Z09 NEED FOR IMMUNIZATION FOLLOW-UP: Primary | ICD-10-CM

## 2018-11-08 PROCEDURE — 90750 HZV VACC RECOMBINANT IM: CPT

## 2018-11-08 PROCEDURE — 90471 IMMUNIZATION ADMIN: CPT

## 2018-11-08 PROCEDURE — 99207 ZZC NO CHARGE NURSE ONLY: CPT

## 2018-11-08 NOTE — MR AVS SNAPSHOT
After Visit Summary   11/8/2018    Grey Markham    MRN: [id number]           Patient Information     Date Of Birth          1955        Visit Information        Provider Department      11/8/2018 8:00 AM [facility]        Today's Diagnoses     Need for immunization follow-up    -  1       Follow-ups after your visit        Who to contact     If you have questions or need follow up information about today's clinic visit or your schedule please contact [facility] directly at [phone].  Normal or non-critical lab and imaging results will be communicated to you by MyChart, letter or phone within 4 business days after the clinic has received the results. If you do not hear from us within 7 days, please contact the clinic through [employer] or phone. If you have a critical or abnormal lab result, we will notify you by phone as soon as possible.  Submit refill requests through [employer] or call your pharmacy and they will forward the refill request to us. Please allow 3 business days for your refill to be completed.          Additional Information About Your Visit        MyChart Information     [employer] gives you secure access to your electronic health record. If you see a primary care provider, you can also send messages to your care team and make appointments. If you have questions, please call your primary care clinic.  If you do not have a primary care provider, please call [phone] and they will assist you.        Care EveryWhere ID     This is your Care EveryWhere ID. This could be used by other organizations to access your [place] medical records  [id number]         Blood Pressure from Last 3 Encounters:   07/09/18 125/82   06/01/18 134/78   06/14/17 131/69    Weight from Last 3 Encounters:   07/06/18 216 lb (98 kg)   06/01/18 221 lb 12.8 oz (100.6 kg)   06/08/17 218 lb (98.9 kg)              We Performed the Following     ADMIN 1st  VACCINE     ZOSTER VACCINE RECOMBINANT ADJUVANTED IM NJX        Primary Care Provider Office Phone # Fax #    Navin Euceda [phone]       [address]  [facility] [address]        Equal Access to Services     [place] MENARD : Hadii aad ku hadasho Soomaali, waaxda luqadaha, qaybta kaalmada adeegyada, waxay idiin hayaan adeeg khaustinsh lalynne . So [facility] [phone].    ATENCIÓN: Si habla español, tiene a awad disposición servicios gratuitos de asistencia lingüística. Llame al [phone].    We comply with applicable federal civil rights laws and [place] laws. We do not discriminate on the basis of race, color, national origin, age, disability, sex, sexual orientation, or gender identity.            Thank you!     Thank you for choosing [facility]  for your care. Our goal is always to provide you with excellent care. Hearing back from our patients is one way we can continue to improve our services. Please take a few minutes to complete the written survey that you may receive in the mail after your visit with us. Thank you!             Your Updated Medication List - Protect others around you: Learn how to safely use, store and throw away your medicines at www.disposemymeds.org.          This list is accurate as of 11/8/18  8:11 AM.  Always use your most recent med list.                   Brand Name Dispense Instructions for use Diagnosis    aspirin 81 MG tablet     100 tablet    Take 1 tablet by mouth daily. *        atorvastatin 80 MG tablet    LIPITOR    90 tablet    TAKE ONE TABLET BY MOUTH ONE TIME DAILY FOR CHOLESTEROL    Hyperlipidemia LDL goal <100, Atherosclerosis of native coronary artery of native heart without angina pectoris       FLUoxetine 20 MG tablet     90 tablet    TAKE ONE TABLET BY MOUTH ONE TIME DAILY FOR ANXIETY PREVENTION    Anxiety       ketoconazole 2 % cream    NIZORAL    30 g    Apply topically 2 times daily as needed (to seborrhea of face)     Seborrhea       pantoprazole 40 MG EC tablet    PROTONIX    90 tablet    TAKE 1 TABLET BY MOUTH ONE TIME DAILY FOR REFLUX    Gastroesophageal reflux disease without esophagitis       sildenafil 20 MG tablet    REVATIO    100 tablet    1-5 tabs ( mg) as dir.,1-3 h before intimacy, daily as needed. Max 1 dose per 24 h. Never use w/ nitroglycerin, terazosin or doxazosin    Combined arterial insufficiency and corporo-venous occlusive erectile dysfunction       sulindac 150 MG tablet    CLINORIL    180 tablet    Take 1 tablet by mouth 2 times daily as needed. for wrist pain. Take with food.    Right wrist pain       valACYclovir 500 MG tablet    VALTREX    20 tablet    TAKE ONE TABLET BY MOUTH EVERY TWELVE HOURS FOR 5 DAYS. START AT EARLIEST ONSET OF SYMPTOMS    Herpetic gingivostomatitis

## 2018-11-08 NOTE — PROGRESS NOTES
Screening Questionnaire for Zostavax Vaccine/Shingrix Vaccine.     1.  Has the patient received the information for the Zostavax vaccine? YES    2.  Does the patient have any of the following contraindications?     Allergy to Neomycin or Gelatin? No    Do you have a weakened immune system because of the current:   A. AIDS or another disease that affect the immune system? NO     B. Receives treatment with drugs that affects the immune system, such as, prolonged use of high dose steroids? NO     C. Cancer treatment such as radiation or chemotherapy , cancer affecting the bone marrow or lymphatic system, such as leukemia or lymphoma? NO     D. Are you pregnant? Women should not become pregnant until at least 4 weeks after getting shingles vacccine. NO     E. Are you moderately or severely ill today or do you have a fever of 101.3 or higher? NO   If temp > 99.0 degrees orally or patient has above allergies, vaccine is deferred    Immunization questionnaire answers were all negative.      Per orders of Dr. Kaur, injection of Shingrix given by Zenaida Mallory.The vaccine has been administered in the usual fashion and the patient was instructed to wait 15 minutes before leaving the building in the event of an allergic reaction.    Patient is informed to returned within 2-6 months from today.    Vaccination given by and recorded by Zenaida Mallory November 8, 2018

## 2018-12-06 DIAGNOSIS — F41.9 ANXIETY: ICD-10-CM

## 2018-12-06 DIAGNOSIS — E78.5 HYPERLIPIDEMIA LDL GOAL <100: ICD-10-CM

## 2018-12-06 DIAGNOSIS — I25.10 ATHEROSCLEROSIS OF NATIVE CORONARY ARTERY OF NATIVE HEART WITHOUT ANGINA PECTORIS: ICD-10-CM

## 2018-12-06 NOTE — TELEPHONE ENCOUNTER
"Requested Prescriptions   Pending Prescriptions Disp Refills     atorvastatin (LIPITOR) 80 MG tablet [Pharmacy Med Name: ATORVASTATIN 80 MG TABLET] 90 tablet 1    Last Written Prescription Date:  6/1/18  Last Fill Quantity: 90,  # refills: 1   Last Office Visit with [facility] or [facility] prescribing provider:  6/1/2018     Future Office Visit:      Sig: TAKE ONE TABLET BY MOUTH ONE TIME DAILY FOR CHOLESTEROL    Statins Protocol Passed    12/6/2018  1:48 PM       Passed - LDL on file in past 12 months    Recent Labs   Lab Test  06/01/18   0816   LDL  60            Passed - No abnormal creatine kinase in past 12 months    No lab results found.            Passed - Recent (12 mo) or future (30 days) visit within the authorizing provider's specialty    Patient had office visit in the last 12 months or has a visit in the next 30 days with authorizing provider or within the authorizing provider's specialty.  See \"Patient Info\" tab in [employer], or \"Choose Columns\" in Meds & Orders section of the refill encounter.             Passed - Patient is age 18 or older        FLUoxetine 20 MG tablet [Pharmacy Med Name: FLUOXETINE HCL 20 MG TABLET] 90 tablet 1    Last Written Prescription Date:  6/1/18  Last Fill Quantity: 90,  # refills: 1   Last Office Visit with [facility] or [facility] prescribing provider:  6/1/2018     Future Office Visit:      Sig: TAKE ONE TABLET BY MOUTH ONE TIME DAILY FOR ANXIETY PREVENTION    SSRIs Protocol Passed    12/6/2018  1:48 PM       Passed - Recent (12 mo) or future (30 days) visit within the authorizing provider's specialty    Patient had office visit in the last 12 months or has a visit in the next 30 days with authorizing provider or within the authorizing provider's specialty.  See \"Patient Info\" tab in [employer], or \"Choose Columns\" in Meds & Orders section of the refill encounter.             Passed - Patient is age 18 or older          "

## 2018-12-11 RX ORDER — FLUOXETINE 20 MG/1
20 TABLET, FILM COATED ORAL DAILY
Qty: 90 TABLET | Refills: 1 | Status: SHIPPED | OUTPATIENT
Start: 2018-12-11 | End: 2019-06-14

## 2018-12-11 RX ORDER — ATORVASTATIN CALCIUM 80 MG/1
80 TABLET, FILM COATED ORAL DAILY
Qty: 90 TABLET | Refills: 1 | Status: SHIPPED | OUTPATIENT
Start: 2018-12-11 | End: 2019-06-18

## 2018-12-11 NOTE — TELEPHONE ENCOUNTER
Routing refill request to provider for review/approval because:  Drug interaction warning            Saroj John RN, BSN

## 2019-06-07 ENCOUNTER — TELEPHONE (OUTPATIENT)
Dept: FAMILY MEDICINE | Facility: CLINIC | Age: 64
End: 2019-06-07

## 2019-06-07 DIAGNOSIS — N52.03 COMBINED ARTERIAL INSUFFICIENCY AND CORPORO-VENOUS OCCLUSIVE ERECTILE DYSFUNCTION: ICD-10-CM

## 2019-06-08 NOTE — TELEPHONE ENCOUNTER
"Requested Prescriptions   Pending Prescriptions Disp Refills     sildenafil (REVATIO) 20 MG tablet      Last Written Prescription Date:  18  Last Fill Quantity: 100,  # refills: 5   Last Office Visit with [facility], [facility] or [facility] prescribing provider:  18   Future Office Visit:      100 tablet 5     Si-5 tabs ( mg) as dir.,1-3 h before intimacy, daily as needed. Max 1 dose per 24 h. Never use w/ nitroglycerin, terazosin or doxazosin       Erectile Dysfuction Protocol Failed - 2019  4:08 PM        Failed - Recent (12 mo) or future (30 days) visit within the authorizing provider's specialty     Patient had office visit in the last 12 months or has a visit in the next 30 days with authorizing provider or within the authorizing provider's specialty.  See \"Patient Info\" tab in inbasket, or \"Choose Columns\" in Meds & Orders section of the refill encounter.              Passed - Absence of nitrates on medication list        Passed - Absence of Alpha Blockers on Med list        Passed - Medication is active on med list        Passed - Patient is age 18 or older              Christopher Faarax  [facility]  "

## 2019-06-11 RX ORDER — SILDENAFIL CITRATE 20 MG/1
TABLET ORAL
Qty: 100 TABLET | Refills: 5 | OUTPATIENT
Start: 2019-06-11

## 2019-06-11 NOTE — TELEPHONE ENCOUNTER
Routing refill request to provider for review/approval because:  Patient needs to be seen because it has been more than 1 year since last office visit.          Saroj John RN, BSN, PHN

## 2019-06-11 NOTE — TELEPHONE ENCOUNTER
This writer attempted to contact patient  on 06/11/19      Reason for call medication has been denied until patient is seen in clinic per Dr. Jaramillo, patient may call our appointment line and schedule an appointment and left message.      If patient calls back:   Schedule Office Visit appointment within 2 business days with primary care, document that pt called and close encounter         Ramon Dickerson

## 2019-06-11 NOTE — TELEPHONE ENCOUNTER
Schedule appointment for office visit. Prescription refused until patient visit. Blanca Jaramillo MD

## 2019-06-14 DIAGNOSIS — F41.9 ANXIETY: ICD-10-CM

## 2019-06-14 DIAGNOSIS — K21.9 GASTROESOPHAGEAL REFLUX DISEASE WITHOUT ESOPHAGITIS: ICD-10-CM

## 2019-06-14 NOTE — TELEPHONE ENCOUNTER
"Requested Prescriptions   Pending Prescriptions Disp Refills     pantoprazole (PROTONIX) 40 MG EC tablet [Pharmacy Med Name: PANTOPRAZOLE SOD DR 40 MG TAB]  Last Written Prescription Date:  06/01/18  Last Fill Quantity: 90,  # refills: 3   Last Office Visit with [facility], [facility] or [facility] prescribing provider:  06/01/18-[place]   Future Office Visit:    Next 5 appointments (look out 90 days)    Jun 18, 2019  8:00 AM CDT  PHYSICAL with Allan Cardona MD  [facility]) [address] [address]  [phone]        90 tablet 3     Sig: TAKE 1 TABLET BY MOUTH ONE TIME DAILY FOR REFLUX       PPI Protocol Passed - 6/14/2019  1:49 AM        Passed - Not on Clopidogrel (unless Pantoprazole ordered)        Passed - No diagnosis of osteoporosis on record        Passed - Recent (12 mo) or future (30 days) visit within the authorizing provider's specialty     Patient had office visit in the last 12 months or has a visit in the next 30 days with authorizing provider or within the authorizing provider's specialty.  See \"Patient Info\" tab in inbasket, or \"Choose Columns\" in Meds & Orders section of the refill encounter.              Passed - Medication is active on med list        Passed - Patient is age 18 or older        FLUoxetine 20 MG tablet [Pharmacy Med Name: FLUOXETINE HCL 20 MG TABLET]  Last Written Prescription Date:  12/11/18  Last Fill Quantity: 90,  # refills: 1   Last Office Visit with [facility] or [facility] prescribing provider:  06/01/18/-[place]   Future Office Visit:    Next 5 appointments (look out 90 days)    Jun 18, 2019  8:00 AM CDT  PHYSICAL with Allan Cardona MD  [facility] ([facility] [address] [address]  [phone]        90 tablet 1     Sig: TAKE 1 TABLET (20 MG) BY MOUTH DAILY FOR ANXIETY PREVENTION.       SSRIs Protocol Passed - 6/14/2019  1:49 AM        Passed - Recent " "(12 mo) or future (30 days) visit within the authorizing provider's specialty     Patient had office visit in the last 12 months or has a visit in the next 30 days with authorizing provider or within the authorizing provider's specialty.  See \"Patient Info\" tab in inbasket, or \"Choose Columns\" in Meds & Orders section of the refill encounter.              Passed - Medication is active on med list        Passed - Patient is age 18 or older          "

## 2019-06-17 NOTE — TELEPHONE ENCOUNTER
Routing refill request to provider for review/approval because:  A break in medication  Janae Nolasco RN

## 2019-06-18 ENCOUNTER — OFFICE VISIT (OUTPATIENT)
Dept: FAMILY MEDICINE | Facility: CLINIC | Age: 64
End: 2019-06-18
Payer: COMMERCIAL

## 2019-06-18 VITALS
HEIGHT: 71 IN | OXYGEN SATURATION: 96 % | RESPIRATION RATE: 16 BRPM | WEIGHT: 214 LBS | TEMPERATURE: 98.1 F | SYSTOLIC BLOOD PRESSURE: 138 MMHG | DIASTOLIC BLOOD PRESSURE: 79 MMHG | BODY MASS INDEX: 29.96 KG/M2 | HEART RATE: 65 BPM

## 2019-06-18 DIAGNOSIS — I25.10 ATHEROSCLEROSIS OF NATIVE CORONARY ARTERY OF NATIVE HEART WITHOUT ANGINA PECTORIS: ICD-10-CM

## 2019-06-18 DIAGNOSIS — R73.09 ELEVATED GLUCOSE: ICD-10-CM

## 2019-06-18 DIAGNOSIS — M79.642 BILATERAL HAND PAIN: ICD-10-CM

## 2019-06-18 DIAGNOSIS — Z00.00 ROUTINE GENERAL MEDICAL EXAMINATION AT A HEALTH CARE FACILITY: Primary | ICD-10-CM

## 2019-06-18 DIAGNOSIS — M79.641 BILATERAL HAND PAIN: ICD-10-CM

## 2019-06-18 DIAGNOSIS — E78.5 HYPERLIPIDEMIA LDL GOAL <100: ICD-10-CM

## 2019-06-18 DIAGNOSIS — N52.03 COMBINED ARTERIAL INSUFFICIENCY AND CORPORO-VENOUS OCCLUSIVE ERECTILE DYSFUNCTION: ICD-10-CM

## 2019-06-18 DIAGNOSIS — Z12.5 SCREENING FOR PROSTATE CANCER: ICD-10-CM

## 2019-06-18 LAB
ALBUMIN SERPL-MCNC: 3.5 G/DL (ref 3.4–5)
ALP SERPL-CCNC: 100 U/L (ref 40–150)
ALT SERPL W P-5'-P-CCNC: 38 U/L (ref 0–70)
ANION GAP SERPL CALCULATED.3IONS-SCNC: 8 MMOL/L (ref 3–14)
AST SERPL W P-5'-P-CCNC: 23 U/L (ref 0–45)
BILIRUB SERPL-MCNC: 0.4 MG/DL (ref 0.2–1.3)
BUN SERPL-MCNC: 14 MG/DL (ref 7–30)
CALCIUM SERPL-MCNC: 8.4 MG/DL (ref 8.5–10.1)
CHLORIDE SERPL-SCNC: 110 MMOL/L (ref 94–109)
CHOLEST SERPL-MCNC: 162 MG/DL
CO2 SERPL-SCNC: 23 MMOL/L (ref 20–32)
CREAT SERPL-MCNC: 0.88 MG/DL (ref 0.66–1.25)
GFR SERPL CREATININE-BSD FRML MDRD: >90 ML/MIN/{1.73_M2}
GLUCOSE SERPL-MCNC: 118 MG/DL (ref 70–99)
HBA1C MFR BLD: 5.8 % (ref 0–5.6)
HDLC SERPL-MCNC: 60 MG/DL
LDLC SERPL CALC-MCNC: 52 MG/DL
NONHDLC SERPL-MCNC: 102 MG/DL
POTASSIUM SERPL-SCNC: 4.2 MMOL/L (ref 3.4–5.3)
PROT SERPL-MCNC: 6.8 G/DL (ref 6.8–8.8)
PSA SERPL-MCNC: 9.85 UG/L (ref 0–4)
SODIUM SERPL-SCNC: 141 MMOL/L (ref 133–144)
TRIGL SERPL-MCNC: 249 MG/DL

## 2019-06-18 PROCEDURE — 80053 COMPREHEN METABOLIC PANEL: CPT | Performed by: FAMILY MEDICINE

## 2019-06-18 PROCEDURE — 84153 ASSAY OF PSA TOTAL: CPT | Performed by: FAMILY MEDICINE

## 2019-06-18 PROCEDURE — 80061 LIPID PANEL: CPT | Performed by: FAMILY MEDICINE

## 2019-06-18 PROCEDURE — 83036 HEMOGLOBIN GLYCOSYLATED A1C: CPT | Performed by: FAMILY MEDICINE

## 2019-06-18 PROCEDURE — 36415 COLL VENOUS BLD VENIPUNCTURE: CPT | Performed by: FAMILY MEDICINE

## 2019-06-18 PROCEDURE — 99396 PREV VISIT EST AGE 40-64: CPT | Performed by: FAMILY MEDICINE

## 2019-06-18 RX ORDER — ATORVASTATIN CALCIUM 80 MG/1
80 TABLET, FILM COATED ORAL DAILY
Qty: 90 TABLET | Refills: 3 | Status: SHIPPED | OUTPATIENT
Start: 2019-06-18 | End: 2020-06-25

## 2019-06-18 RX ORDER — PANTOPRAZOLE SODIUM 40 MG/1
TABLET, DELAYED RELEASE ORAL
Qty: 90 TABLET | Refills: 1 | Status: SHIPPED | OUTPATIENT
Start: 2019-06-18 | End: 2019-12-04

## 2019-06-18 RX ORDER — SILDENAFIL CITRATE 20 MG/1
TABLET ORAL
Qty: 100 TABLET | Refills: 5 | Status: SHIPPED | OUTPATIENT
Start: 2019-06-18 | End: 2020-07-07

## 2019-06-18 RX ORDER — FLUOXETINE 20 MG/1
20 TABLET, FILM COATED ORAL DAILY
Qty: 90 TABLET | Refills: 1 | Status: SHIPPED | OUTPATIENT
Start: 2019-06-18 | End: 2020-01-20

## 2019-06-18 ASSESSMENT — PAIN SCALES - GENERAL: PAINLEVEL: NO PAIN (0)

## 2019-06-18 ASSESSMENT — MIFFLIN-ST. JEOR: SCORE: 1792.83

## 2019-06-18 NOTE — TELEPHONE ENCOUNTER
Patient is scheduled for today and medication will be addressed during his visit.  Ramon Dickerson,  [facility]

## 2019-06-18 NOTE — PROGRESS NOTES
SUBJECTIVE:   CC: Grey Markham is an 63 year old male who presents for preventive health visit.     Healthy Habits:    Do you get at least three servings of calcium containing foods daily (dairy, green leafy vegetables, etc.)? yes    Amount of exercise or daily activities, outside of work: 5-7 day(s) per week    Problems taking medications regularly No    Medication side effects: No    Have you had an eye exam in the past two years? yes    Do you see a dentist twice per year? yes    Do you have sleep apnea, excessive snoring or daytime drowsiness?yes, snoring      Today's PHQ-2 Score:   PHQ-2 ( 1999 Pf[employer]) 6/18/2019 6/1/2018   Q1: Little interest or pleasure in doing things 0 0   Q2: Feeling down, depressed or hopeless 0 0   PHQ-2 Score 0 0       Abuse: Current or Past(Physical, Sexual or Emotional)- No  Do you feel safe in your environment? Yes    Social History     Tobacco Use     Smoking status: Never Smoker     Smokeless tobacco: Never Used   Substance Use Topics     Alcohol use: Yes     Comment: weekends     If you drink alcohol do you typically have >3 drinks per day or >7 drinks per week? Yes - AUDIT SCORE:  8  AUDIT - Alcohol Use Disorders Identification Test - Reproduced from the World Health Organization Audit 2001 (Second Edition) 6/18/2019   1.  How often do you have a drink containing alcohol? 2 to 3 times a week   2.  How many drinks containing alcohol do you have on a typical day when you are drinking? 5 or 6   3.  How often do you have five or more drinks on one occasion? Weekly   4.  How often during the last year have you found that you were not able to stop drinking once you had started? Never   5.  How often during the last year have you failed to do what was normally expected of you because of drinking? Never   6.  How often during the last year have you needed a first drink in the morning to get yourself going after a heavy drinking session? Never   7.  How often during the last year have  "you had a feeling of guilt or remorse after drinking? Never   8.  How often during the last year have you been unable to remember what happened the night before because of your drinking? Never   9.  Have you or someone else been injured because of your drinking? No   10. Has a relative, friend, doctor or other health care worker been concerned about your drinking or suggested you cut down? No   TOTAL SCORE 8                         Last PSA:   PSA   Date Value Ref Range Status   05/17/2016 11.56 (H) 0 - 4 ug/L Final       Reviewed orders with patient. Reviewed health maintenance and updated orders accordingly - Yes  Labs reviewed in EPIC    Reviewed and updated as needed this visit by clinical staff  Tobacco  Allergies  Meds  Med Hx  Surg Hx  Fam Hx  Soc Hx        Reviewed and updated as needed this visit by Provider            ROS:  CONSTITUTIONAL: NEGATIVE for fever, chills, change in weight  INTEGUMENTARY/SKIN: NEGATIVE for worrisome rashes, moles or lesions  EYES: NEGATIVE for vision changes or irritation  ENT: NEGATIVE for ear, mouth and throat problems  RESP: NEGATIVE for significant cough or SOB  CV: NEGATIVE for chest pain, palpitations or peripheral edema  GI: NEGATIVE for nausea, abdominal pain, heartburn, or change in bowel habits   male: negative for dysuria, hematuria, decreased urinary stream, erectile dysfunction, urethral discharge  MUSCULOSKELETAL: NEGATIVE for significant arthralgias or myalgia  NEURO: NEGATIVE for weakness, dizziness or paresthesias  PSYCHIATRIC: NEGATIVE for changes in mood or affect    OBJECTIVE:   /79 (BP Location: Left arm, Patient Position: Chair, Cuff Size: Adult Large)   Pulse 65   Temp 98.1  F (36.7  C) (Oral)   Resp 16   Ht 1.803 m (5' 11\")   Wt 97.1 kg (214 lb)   SpO2 96%   BMI 29.85 kg/m    EXAM:  GENERAL: healthy, alert and no distress  NECK: no adenopathy, no asymmetry, masses, or scars and thyroid normal to palpation  RESP: lungs clear to " "auscultation - no rales, rhonchi or wheezes  CV: regular rate and rhythm, normal S1 S2, no S3 or S4, no murmur, click or rub, no peripheral edema and peripheral pulses strong  ABDOMEN: soft, nontender, no hepatosplenomegaly, no masses and bowel sounds normal  MS: no gross musculoskeletal defects noted, no edema    Diagnostic Test Results:  Labs reviewed in Epic    ASSESSMENT/PLAN:   1. Routine general medical examination at a health care facility  As below.    2. Hyperlipidemia LDL goal <100  Controlled.  - Comprehensive metabolic panel (BMP + Alb, Alk Phos, ALT, AST, Total. Bili, TP)  - Lipid Profile (Chol, Trig, HDL, LDL calc)  - atorvastatin (LIPITOR) 80 MG tablet; Take 1 tablet (80 mg) by mouth daily for cholesterol.  Dispense: 90 tablet; Refill: 3    3. Elevated glucose  Recheck with a1c. Discussed weight goal.  - Comprehensive metabolic panel (BMP + Alb, Alk Phos, ALT, AST, Total. Bili, TP)  - Hemoglobin A1c    4. Screening for prostate cancer    - PSA, tumor marker    5. Atherosclerosis of native coronary artery of native heart without angina pectoris    - atorvastatin (LIPITOR) 80 MG tablet; Take 1 tablet (80 mg) by mouth daily for cholesterol.  Dispense: 90 tablet; Refill: 3    6. Bilateral hand pain    - ORTHO  REFERRAL    7. Combined arterial insufficiency and corporo-venous occlusive erectile dysfunction    - sildenafil (REVATIO) 20 MG tablet; 1-5 tabs ( mg) as dir.,1-3 h before intimacy, daily as needed. Max 1 dose per 24 h. Never use w/ nitroglycerin, terazosin or doxazosin  Dispense: 100 tablet; Refill: 5    COUNSELING:  Reviewed preventive health counseling, as reflected in patient instructions       Regular exercise       Healthy diet/nutrition       Vision screening    Estimated body mass index is 29.85 kg/m  as calculated from the following:    Height as of this encounter: 1.803 m (5' 11\").    Weight as of this encounter: 97.1 kg (214 lb).         reports that he has never smoked. " He has never used smokeless tobacco.      Counseling Resources:  ATP IV Guidelines  Pooled Cohorts Equation Calculator  FRAX Risk Assessment  ICSI Preventive Guidelines  Dietary Guidelines for Americans, 2010  USDA's MyPlate  ASA Prophylaxis  Lung CA Screening    Allan Cardona MD, MD  [facility]

## 2019-06-18 NOTE — PATIENT INSTRUCTIONS
Preventive Health Recommendations  Male Ages 50 - 64    Yearly exam:             See your health care provider every year in order to  o   Review health changes.   o   Discuss preventive care.    o   Review your medicines if your doctor has prescribed any.     Have a cholesterol test every 5 years, or more frequently if you are at risk for high cholesterol/heart disease.     Have a diabetes test (fasting glucose) every three years. If you are at risk for diabetes, you should have this test more often.     Have a colonoscopy at age 50, or have a yearly FIT test (stool test). These exams will check for colon cancer.      Talk with your health care provider about whether or not a prostate cancer screening test (PSA) is right for you.    You should be tested each year for STDs (sexually transmitted diseases), if you re at risk.     Shots: Get a flu shot each year. Get a tetanus shot every 10 years.     Nutrition:    Eat at least 5 servings of fruits and vegetables daily.     Eat whole-grain bread, whole-wheat pasta and brown rice instead of white grains and rice.     Get adequate Calcium and Vitamin D.     Lifestyle    Exercise for at least 150 minutes a week (30 minutes a day, 5 days a week). This will help you control your weight and prevent disease.     Limit alcohol to one drink per day.     No smoking.     Wear sunscreen to prevent skin cancer.     See your dentist every six months for an exam and cleaning.     See your eye doctor every 1 to 2 years.    At [facility], we strive to deliver an exceptional experience to you, every time we see you.  If you receive a survey in the mail, please send us back your thoughts. We really do value your feedback.    Based on your medical history, these are the current health maintenance/preventive care services that you are due for (some may have been done at this visit.)  Health Maintenance Due   Topic Date Due     PHQ-2  01/01/2019     PREVENTIVE CARE  VISIT  06/01/2019         Suggested websites for health information:  Www.[facility].org : Up to date and easily searchable information on multiple topics.  Www.medlineplus.gov : medication info, interactive tutorials, watch real surgeries online  Www.familydoctor.org : good info from the Academy of Family Physicians  Www.cdc.gov : public health info, travel advisories, epidemics (H1N1)  Www.aap.org : children's health info, normal development, vaccinations  Www.health.[facility].[place].us : [place] dept of health, public health issues in [place], N1N1    Your care team:                            Family Medicine Internal Medicine   MD Kuldeep Fernandez, MD Judith Sarah PA-C, MD Pediatrics   RYAN Sherwood, JOESPH Craig APRN CNP   MD Tayler Meyer MD Deborah Mielke, MD Kim Thein, APRN CNP      Clinic hours: Monday - Thursday 7 am-7 pm; Fridays 7 am-5 pm.   Urgent care: Monday - Friday 11 am-9 pm; Saturday and Sunday 9 am-5 pm.  Pharmacy : Monday -Thursday 8 am-8 pm; Friday 8 am-6 pm; Saturday and Sunday 9 am-5 pm.     Clinic: [phone]   Pharmacy: [phone]

## 2019-06-19 DIAGNOSIS — B00.2 HERPETIC GINGIVOSTOMATITIS: ICD-10-CM

## 2019-06-19 NOTE — TELEPHONE ENCOUNTER
"Requested Prescriptions   Pending Prescriptions Disp Refills     valACYclovir (VALTREX) 500 MG tablet [Pharmacy Med Name: VALACYCLOVIR  MG TABLET]  Last Written Prescription Date:  02/27/18  Last Fill Quantity: 20,  # refills: 2   Last Office Visit with [facility] or [facility] prescribing provider:  06/18/19-   Future Office Visit:    20 tablet 1     Sig: TAKE ONE TABLET BY MOUTH EVERY TWELVE HOURS FOR 5 DAYS. START AT EARLIEST ONSET OF SYMPTOMS       Antivirals for Herpes Protocol Passed - 6/19/2019  9:11 AM        Passed - Patient is age 12 or older        Passed - Recent (12 mo) or future (30 days) visit within the authorizing provider's specialty     Patient had office visit in the last 12 months or has a visit in the next 30 days with authorizing provider or within the authorizing provider's specialty.  See \"Patient Info\" tab in inbasket, or \"Choose Columns\" in Meds & Orders section of the refill encounter.              Passed - Medication is active on med list        Passed - Normal serum creatinine on file in past 12 months     Recent Labs   Lab Test 06/18/19  0821   CR 0.88               "

## 2019-06-20 RX ORDER — VALACYCLOVIR HYDROCHLORIDE 500 MG/1
TABLET, FILM COATED ORAL
Qty: 20 TABLET | Refills: 1 | Status: SHIPPED | OUTPATIENT
Start: 2019-06-20 | End: 2020-07-11

## 2019-06-20 NOTE — TELEPHONE ENCOUNTER
Prescription approved per [facility] Refill Protocol.  Michelle Oakley RN

## 2019-06-21 ENCOUNTER — TELEPHONE (OUTPATIENT)
Dept: ORTHOPEDICS | Facility: CLINIC | Age: 64
End: 2019-06-21

## 2019-07-11 ENCOUNTER — OFFICE VISIT (OUTPATIENT)
Dept: ORTHOPEDICS | Facility: CLINIC | Age: 64
End: 2019-07-11
Payer: COMMERCIAL

## 2019-07-11 ENCOUNTER — ANCILLARY PROCEDURE (OUTPATIENT)
Dept: GENERAL RADIOLOGY | Facility: CLINIC | Age: 64
End: 2019-07-11
Attending: FAMILY MEDICINE
Payer: COMMERCIAL

## 2019-07-11 VITALS — WEIGHT: 214 LBS | BODY MASS INDEX: 29.96 KG/M2 | HEIGHT: 71 IN

## 2019-07-11 DIAGNOSIS — M79.642 BILATERAL HAND PAIN: ICD-10-CM

## 2019-07-11 DIAGNOSIS — M79.641 BILATERAL HAND PAIN: ICD-10-CM

## 2019-07-11 DIAGNOSIS — M79.642 BILATERAL HAND PAIN: Primary | ICD-10-CM

## 2019-07-11 DIAGNOSIS — M79.641 BILATERAL HAND PAIN: Primary | ICD-10-CM

## 2019-07-11 PROCEDURE — 73130 X-RAY EXAM OF HAND: CPT | Mod: LT | Performed by: RADIOLOGY

## 2019-07-11 PROCEDURE — 99214 OFFICE O/P EST MOD 30 MIN: CPT | Performed by: FAMILY MEDICINE

## 2019-07-11 ASSESSMENT — MIFFLIN-ST. JEOR: SCORE: 1792.83

## 2019-07-11 ASSESSMENT — PAIN SCALES - GENERAL: PAINLEVEL: MODERATE PAIN (4)

## 2019-07-11 NOTE — LETTER
"    7/11/2019         RE: Grey Markham  [address]  [place] [address]        Dear Colleague,    Thank you for referring your patient, Grey Markham, to the [facility]. Please see a copy of my visit note below.          [facility]  7/11/2019    Grey Markham's chief complaint for this visit includes:  Chief Complaint   Patient presents with     Consult     bilateral hand pain, right thumb pain, no known injury      PCP: Navin Euceda    Referring Provider:  Allan Cardona MD  [address] [place], [place] [address] [place] [address]    Ht 1.803 m (5' 11\")   Wt 97.1 kg (214 lb)   BMI 29.85 kg/m     Moderate Pain (4)       Reason for visit:     What part of your body is injured / painful?  bilateral wrist    What caused the injury /pain? No inciting event     How long ago did your injury occur or pain begin? problem is longstanding    What are your most bothersome symptoms? Pain    How would you characterize your symptom?  aching    What makes your symptoms better? Rest    What makes your symptoms worse? Movement    Have you been previously seen for this problem? No    Medical History:    Any recent changes to your medical history? No    Any new medication prescribed since last visit? No    Have you had surgery on this body part before? No    Social History:    Occupation: customer services     Handedness: Left    Review of Systems:    Do you have fever, chills, weight loss? No    Do you have any vision problems? No    Do you have any chest pain or edema? No    Do you have any shortness of breath or wheezing?  No    Do you have stomach problems? No    Do you have any numbness or focal weakness? No    Do you have diabetes? No    Do you have problems with bleeding or clotting? No    Do you have an rashes or other skin lesions? No       CHIEF COMPLAINT:  Consult (bilateral hand pain, right thumb pain, no known injury )       HISTORY OF PRESENT ILLNESS  Mr. Markham is a pleasant 63 " year old year old male who presents to clinic today with bilateral hand pain.  He is seen at the request of Dr. Euceda.  Grey has been experiencing pain in both of his hands for years, right is worse than left.  He points to the thumb region, pain starts near the base of his thumb and travels up his wrist slightly.  Grey works in customer service, he is frequently on the computer.  His pain is worse with typing and reaching over to the with his thumb.  He is left-handed.    rGey has tried braces, oral medications, and relative rest.    Additional history: as documented    MEDICAL HISTORY  Patient Active Problem List   Diagnosis     Gastroesophageal reflux disease without esophagitis     Atherosclerosis of native coronary artery of native heart without angina pectoris     Hyperlipidemia LDL goal <100     Oral herpes     Anxiety     Advance care planning     Glaucoma     Obesity, Class I, BMI 30-34.9     Elevated prostate specific antigen (PSA)     Dysuria     Photophobia of both eyes     Combined arterial insufficiency and corporo-venous occlusive erectile dysfunction       Current Outpatient Medications   Medication Sig Dispense Refill     aspirin 81 MG tablet Take 1 tablet by mouth daily. * 100 tablet prn     atorvastatin (LIPITOR) 80 MG tablet Take 1 tablet (80 mg) by mouth daily for cholesterol. 90 tablet 3     FLUoxetine 20 MG tablet TAKE 1 TABLET (20 MG) BY MOUTH DAILY FOR ANXIETY PREVENTION. 90 tablet 1     ketoconazole (NIZORAL) 2 % cream Apply topically 2 times daily as needed (to seborrhea of face) 30 g 1     pantoprazole (PROTONIX) 40 MG EC tablet TAKE 1 TABLET BY MOUTH ONE TIME DAILY FOR REFLUX 90 tablet 1     sildenafil (REVATIO) 20 MG tablet 1-5 tabs ( mg) as dir.,1-3 h before intimacy, daily as needed. Max 1 dose per 24 h. Never use w/ nitroglycerin, terazosin or doxazosin 100 tablet 5     sulindac (CLINORIL) 150 MG tablet Take 1 tablet by mouth 2 times daily as needed. for wrist pain. Take  "with food. 180 tablet 0     valACYclovir (VALTREX) 500 MG tablet TAKE ONE TABLET BY MOUTH EVERY TWELVE HOURS FOR 5 DAYS. START AT EARLIEST ONSET OF SYMPTOMS 20 tablet 1       Allergies   Allergen Reactions     Gemfibrozil Diarrhea     No Clinical Screening - See Comments      All glaucoma drops except travatan     Pilocarpine Hydrochloride      Eye drops     Timoptic [Timolol Maleate]      Eye drops only       Family History   Problem Relation Age of Onset     Cancer Father          lymphoma age 73     Glaucoma Father      Glaucoma Mother 87     Cerebrovascular Disease Mother      Cancer Maternal Grandmother      Cancer Other         aunt liver cancer     Diabetes Other         mother's relatives     C.A.D. No family hx of      Macular Degeneration No family hx of      Hypertension No family hx of      Thyroid Disease No family hx of      Anesthesia Reaction No family hx of      Bleeding Disorder No family hx of      Thrombophilia No family hx of        Additional medical/Social/Surgical histories reviewed in [facility] and updated as appropriate.        PHYSICAL EXAM  Vitals:    19 0908   Weight: 97.1 kg (214 lb)   Height: 1.803 m (5' 11\")     General  - normal appearance, in no obvious distress  CV  - normal radial pulse  Pulm  - normal respiratory pattern, non-labored  Musculoskeletal - right and left thumbs, hands  - inspection: no atrophy, normal joint alignment, no swelling  - palpation: trace CMC tenderness on right, no soft tissue tenderness, no tenderness at the anatomical snuffbox  - ROM:  MCP 70 deg flexion   0 deg extension   IP 90 deg flexion   0 deg extension  - strength: 5/5  strength, 5/5 wrist abduction, 5/5 flexion, extension, pronation, supination, adduction  - special tests:  (-) varus  (-) valgus  Neuro  - no numbness, no motor deficit, grossly normal coordination, normal muscle tone  Skin  - no ecchymosis, erythema, warmth, or induration, no obvious rash  Psych  - interactive, " appropriate, normal mood and affect               ASSESSMENT & PLAN  Mr. Markham is a 63 year old year old male who presents to clinic today with bilateral hand pain.    I ordered and reviewed x-rays of his hands which do reveal CMC osteoarthritis, worse in the right.    We discussed management from a nonoperative standpoint.  I do think he will do well with a brief period of immobilization, heat and ice based exercise using a dry rice bowl, and topical treatment with Arnica gel.  In the future we could perform corticosteroid injections, if his pain persists.    It was a pleasure seeing Grey today.    Randolph Zuleta DO, [facility]  Primary Care Sports Medicine      This note was constructed using Dragon dictation software, please excuse any minor errors in spelling, grammar, or syntax.      Again, thank you for allowing me to participate in the care of your patient.        Sincerely,        Randolph Zuleta DO

## 2019-07-11 NOTE — PROGRESS NOTES
"      [facility]  7/11/2019    Grey Markham's chief complaint for this visit includes:  Chief Complaint   Patient presents with     Consult     bilateral hand pain, right thumb pain, no known injury      PCP: Navin uEceda    Referring Provider:  Allan Cardona MD  [facility] [place] [address]     1.803 m (5' 11\")   Wt 97.1 kg (214 lb)   BMI 29.85 kg/m    Moderate Pain (4)       Reason for visit:     What part of your body is injured / painful?  bilateral wrist    What caused the injury /pain? No inciting event     How long ago did your injury occur or pain begin? problem is longstanding    What are your most bothersome symptoms? Pain    How would you characterize your symptom?  aching    What makes your symptoms better? Rest    What makes your symptoms worse? Movement    Have you been previously seen for this problem? No    Medical History:    Any recent changes to your medical history? No    Any new medication prescribed since last visit? No    Have you had surgery on this body part before? No    Social History:    Occupation: customer services     Handedness: Left    Review of Systems:    Do you have fever, chills, weight loss? No    Do you have any vision problems? No    Do you have any chest pain or edema? No    Do you have any shortness of breath or wheezing?  No    Do you have stomach problems? No    Do you have any numbness or focal weakness? No    Do you have diabetes? No    Do you have problems with bleeding or clotting? No    Do you have an rashes or other skin lesions? No       CHIEF COMPLAINT:  Consult (bilateral hand pain, right thumb pain, no known injury )       HISTORY OF PRESENT ILLNESS  Mr. Markham is a pleasant 63 year old year old male who presents to clinic today with bilateral hand pain.  He is seen at the request of Dr. Euceda.  Grey has been experiencing pain in both of his hands for years, right is worse than left.  He points to the thumb region, pain " starts near the base of his thumb and travels up his wrist slightly.  Grey works in customer service, he is frequently on the computer.  His pain is worse with typing and reaching over to the with his thumb.  He is left-handed.    Grey has tried braces, oral medications, and relative rest.    Additional history: as documented    MEDICAL HISTORY  Patient Active Problem List   Diagnosis     Gastroesophageal reflux disease without esophagitis     Atherosclerosis of native coronary artery of native heart without angina pectoris     Hyperlipidemia LDL goal <100     Oral herpes     Anxiety     Advance care planning     Glaucoma     Obesity, Class I, BMI 30-34.9     Elevated prostate specific antigen (PSA)     Dysuria     Photophobia of both eyes     Combined arterial insufficiency and corporo-venous occlusive erectile dysfunction       Current Outpatient Medications   Medication Sig Dispense Refill     aspirin 81 MG tablet Take 1 tablet by mouth daily. * 100 tablet prn     atorvastatin (LIPITOR) 80 MG tablet Take 1 tablet (80 mg) by mouth daily for cholesterol. 90 tablet 3     FLUoxetine 20 MG tablet TAKE 1 TABLET (20 MG) BY MOUTH DAILY FOR ANXIETY PREVENTION. 90 tablet 1     ketoconazole (NIZORAL) 2 % cream Apply topically 2 times daily as needed (to seborrhea of face) 30 g 1     pantoprazole (PROTONIX) 40 MG EC tablet TAKE 1 TABLET BY MOUTH ONE TIME DAILY FOR REFLUX 90 tablet 1     sildenafil (REVATIO) 20 MG tablet 1-5 tabs ( mg) as dir.,1-3 h before intimacy, daily as needed. Max 1 dose per 24 h. Never use w/ nitroglycerin, terazosin or doxazosin 100 tablet 5     sulindac (CLINORIL) 150 MG tablet Take 1 tablet by mouth 2 times daily as needed. for wrist pain. Take with food. 180 tablet 0     valACYclovir (VALTREX) 500 MG tablet TAKE ONE TABLET BY MOUTH EVERY TWELVE HOURS FOR 5 DAYS. START AT EARLIEST ONSET OF SYMPTOMS 20 tablet 1       Allergies   Allergen Reactions     Gemfibrozil Diarrhea     No Clinical  "Screening - See Comments      All glaucoma drops except travatan     Pilocarpine Hydrochloride      Eye drops     Timoptic [Timolol Maleate]      Eye drops only       Family History   Problem Relation Age of Onset     Cancer Father          lymphoma age 73     Glaucoma Father      Glaucoma Mother 87     Cerebrovascular Disease Mother      Cancer Maternal Grandmother      Cancer Other         aunt liver cancer     Diabetes Other         mother's relatives     C.A.D. No family hx of      Macular Degeneration No family hx of      Hypertension No family hx of      Thyroid Disease No family hx of      Anesthesia Reaction No family hx of      Bleeding Disorder No family hx of      Thrombophilia No family hx of        Additional medical/Social/Surgical histories reviewed in [facility] and updated as appropriate.        PHYSICAL EXAM  Vitals:    19 0908   Weight: 97.1 kg (214 lb)   Height: 1.803 m (5' 11\")     General  - normal appearance, in no obvious distress  CV  - normal radial pulse  Pulm  - normal respiratory pattern, non-labored  Musculoskeletal - right and left thumbs, hands  - inspection: no atrophy, normal joint alignment, no swelling  - palpation: trace CMC tenderness on right, no soft tissue tenderness, no tenderness at the anatomical snuffbox  - ROM:  MCP 70 deg flexion   0 deg extension   IP 90 deg flexion   0 deg extension  - strength: 5/5  strength, 5/5 wrist abduction, 5/5 flexion, extension, pronation, supination, adduction  - special tests:  (-) varus  (-) valgus  Neuro  - no numbness, no motor deficit, grossly normal coordination, normal muscle tone  Skin  - no ecchymosis, erythema, warmth, or induration, no obvious rash  Psych  - interactive, appropriate, normal mood and affect               ASSESSMENT & PLAN  Mr. Markham is a 63 year old year old male who presents to clinic today with bilateral hand pain.    I ordered and reviewed x-rays of his hands which do reveal CMC osteoarthritis, worse in " the right.    We discussed management from a nonoperative standpoint.  I do think he will do well with a brief period of immobilization, heat and ice based exercise using a dry rice bowl, and topical treatment with Arnica gel.  In the future we could perform corticosteroid injections, if his pain persists.    It was a pleasure seeing Grey today.    Randolph Zuleta DO, [facility]  Primary Care Sports Medicine      This note was constructed using Dragon dictation software, please excuse any minor errors in spelling, grammar, or syntax.

## 2019-09-28 ENCOUNTER — HEALTH MAINTENANCE LETTER (OUTPATIENT)
Age: 64
End: 2019-09-28

## 2019-10-01 ENCOUNTER — ANCILLARY PROCEDURE (OUTPATIENT)
Dept: GENERAL RADIOLOGY | Facility: CLINIC | Age: 64
End: 2019-10-01
Attending: PHYSICIAN ASSISTANT
Payer: COMMERCIAL

## 2019-10-01 ENCOUNTER — OFFICE VISIT (OUTPATIENT)
Dept: FAMILY MEDICINE | Facility: CLINIC | Age: 64
End: 2019-10-01
Payer: COMMERCIAL

## 2019-10-01 VITALS
TEMPERATURE: 98.2 F | RESPIRATION RATE: 18 BRPM | HEIGHT: 71 IN | BODY MASS INDEX: 30.52 KG/M2 | HEART RATE: 69 BPM | WEIGHT: 218 LBS | OXYGEN SATURATION: 97 % | DIASTOLIC BLOOD PRESSURE: 87 MMHG | SYSTOLIC BLOOD PRESSURE: 131 MMHG

## 2019-10-01 DIAGNOSIS — I25.10 ATHEROSCLEROSIS OF NATIVE CORONARY ARTERY OF NATIVE HEART WITHOUT ANGINA PECTORIS: ICD-10-CM

## 2019-10-01 DIAGNOSIS — R07.9 CHEST PAIN, UNSPECIFIED TYPE: ICD-10-CM

## 2019-10-01 DIAGNOSIS — R07.9 CHEST PAIN, UNSPECIFIED TYPE: Primary | ICD-10-CM

## 2019-10-01 PROCEDURE — 93000 ELECTROCARDIOGRAM COMPLETE: CPT | Performed by: PHYSICIAN ASSISTANT

## 2019-10-01 PROCEDURE — 71046 X-RAY EXAM CHEST 2 VIEWS: CPT

## 2019-10-01 PROCEDURE — 99214 OFFICE O/P EST MOD 30 MIN: CPT | Performed by: PHYSICIAN ASSISTANT

## 2019-10-01 ASSESSMENT — MIFFLIN-ST. JEOR: SCORE: 1810.97

## 2019-10-01 NOTE — PATIENT INSTRUCTIONS
At [facility], we strive to deliver an exceptional experience to you, every time we see you.  If you receive a survey in the mail, please send us back your thoughts. We really do value your feedback.    Your care team:     Family Medicine   RYAN Dowling MD Emily Bunt, APRN CNP S. Matthew Hockett, MD Pamela Kolacz, MD Angela Wermerskirchen, MD         Clinic hours: Monday - Wednesday 7 am-7 pm   Thursdays and Fridays 7 am-5 pm.     [facility]: Monday - Friday 11 am-9 pm,   Saturday and Sunday 9 am-5 pm.    [facility]: Monday -Thursday 8 am-8 pm; Friday 8 am-6 pm; Saturday and Sunday 9 am-5 pm.     [facility]: Monday - Thursday 8 am - 7 pm; Friday 8 am - 6 pm    Clinic: [phone]   [facility]: [phone]   [facility]: [phone]            Patient Education     Noncardiac Chest Pain    Based on your visit today, the healthcare provider doesn t know what is causing your chest pain. In most cases, people who come to the emergency department with chest pain don t have a problem with their heart. Instead, the pain is caused by other conditions. It's important for the healthcare team to be sure you are not having a life threatening cause for chest pain such as a heart attack, blood clot in the lungs, collapsed lung, ruptured esophagus, or tearing of the aorta. Once these major causes have been ruled out, you may have further evaluation for non-heart causes of chest pain. These may be problems with the lungs, muscles, bones, digestive tract, nerves, or mental health.  Lung problems    Inflammation around the lungs (pleurisy)    Collapsed lung (pneumothorax)    Fluid around the lungs (pleural effusion)    Lung cancer (a rare cause of chest pain)  Muscle or bone problems    Inflamed cartilage between the ribs (costochondritis)    Fibromyalgia    Rheumatoid arthritis    Chest  wall strain  Digestive system problems    Reflux    Stomach ulcer    Spasms of the esophagus    Gall stones    Gallbladder inflammation  Mental health conditions    Panic or anxiety attacks    Emotional distress  Your condition doesn t seem serious and your pain doesn t appear to be coming from your heart. But sometimes the signs of a serious problem take more time to appear. Watch for the warning signs listed below.  Home care  Follow these guidelines when caring for yourself at home:    Rest today and avoid strenuous activity.    Take any prescribed medicine as directed.  Follow-up care  Follow up with your healthcare provider, or as advised, if you don t start to feel better within 24 hours.  When to seek medical advice  Call your healthcare provider right away if any of these occur:    A change in the type of pain. Call if it feels different, becomes more serious, lasts longer, or begins to spread into your shoulder, arm, neck, jaw, or back.    Shortness of breath    You feel more pain when you breathe    Cough with dark-colored mucus or blood    Weakness, dizziness, or fainting    Fever of 100.4 F (38 C) or higher, or as directed by your healthcare provider    Swelling, pain, or redness in one leg  Date Last Reviewed: 12/1/2016 2000-2018 [employer]. [address] [place] [address], [place] [address]. All rights reserved. This information is not intended as a substitute for professional medical care. Always follow your healthcare professional's instructions.

## 2019-10-01 NOTE — PROGRESS NOTES
Subjective     Grey Markham is a 63 year old male who presents to clinic today for the following health issues:    HPI   Patient voices that over the last week he has been getting BP high readings. He checks his BP at work. He says that lately there has been some work related stress that may certainly associate with his high readings. He also voices that he's had tension HA over the past weekend.  Had been having some sternal mild chest pain as well over the past week.  Had an angiogram 2008 with very s mall blockage of one artery 10-15 % in 2009.  chest pain is mild, lasts less than one second, is occasional , nonexertional, and seems reproducible with certain movements of his torso.  No cough or fevers.      Readings ranged from 185's-150 on top and 149-95's.    Wasn't sitting for 5 minutes prior to checking any of these.      No PE risk factors. Nonsmoker, no fam hx CAD              Allergies   Allergen Reactions     Gemfibrozil Diarrhea     No Clinical Screening - See Comments      All glaucoma drops except travatan     Pilocarpine Hydrochloride      Eye drops     Timoptic [Timolol Maleate]      Eye drops only       Patient Active Problem List   Diagnosis     Gastroesophageal reflux disease without esophagitis     Atherosclerosis of native coronary artery of native heart without angina pectoris     Hyperlipidemia LDL goal <100     Oral herpes     Anxiety     Advance care planning     Glaucoma     Obesity, Class I, BMI 30-34.9     Elevated prostate specific antigen (PSA)     Dysuria     Photophobia of both eyes     Combined arterial insufficiency and corporo-venous occlusive erectile dysfunction       Past Medical History:   Diagnosis Date     Coronary atherosclerosis of native coronary artery 12/30/09    10%  mid-LAD lesion, Dr. Mendoza     DDD (degenerative disc disease), lumbar     L-4-L5, L5-S1     GERD (gastroesophageal reflux disease) 11/2007    no Rose's     Glaucoma      Glaucoma      Hyperlipidemia LDL  goal <100     hyper-TG, on Simv 80mg since at least 1/4/10     Oral herpes        atorvastatin (LIPITOR) 80 MG tablet, Take 1 tablet (80 mg) by mouth daily for cholesterol.  FLUoxetine 20 MG tablet, TAKE 1 TABLET (20 MG) BY MOUTH DAILY FOR ANXIETY PREVENTION.  ketoconazole (NIZORAL) 2 % cream, Apply topically 2 times daily as needed (to seborrhea of face)  pantoprazole (PROTONIX) 40 MG EC tablet, TAKE 1 TABLET BY MOUTH ONE TIME DAILY FOR REFLUX  sildenafil (REVATIO) 20 MG tablet, 1-5 tabs ( mg) as dir.,1-3 h before intimacy, daily as needed. Max 1 dose per 24 h. Never use w/ nitroglycerin, terazosin or doxazosin  sulindac (CLINORIL) 150 MG tablet, Take 1 tablet by mouth 2 times daily as needed. for wrist pain. Take with food.  valACYclovir (VALTREX) 500 MG tablet, TAKE ONE TABLET BY MOUTH EVERY TWELVE HOURS FOR 5 DAYS. START AT EARLIEST ONSET OF SYMPTOMS  aspirin 81 MG tablet, Take 1 tablet by mouth daily. *    No current facility-administered medications on file prior to visit.       Social History     Tobacco Use     Smoking status: Never Smoker     Smokeless tobacco: Never Used   Substance Use Topics     Alcohol use: Yes     Comment: weekends     Drug use: No       Family History   Problem Relation Age of Onset     Cancer Father          lymphoma age 73     Glaucoma Father      Glaucoma Mother 87     Cerebrovascular Disease Mother      Cancer Maternal Grandmother      Cancer Other         aunt liver cancer     Diabetes Other         mother's relatives     C.A.D. No family hx of      Macular Degeneration No family hx of      Hypertension No family hx of      Thyroid Disease No family hx of      Anesthesia Reaction No family hx of      Bleeding Disorder No family hx of      Thrombophilia No family hx of        ROS:  General: negative for fever  Resp: negative for dyspnea  CV: + for chest pain  Neurologic:tension headache as above    OBJECTIVE:  /87 (BP Location: Right arm, Patient Position: Sitting,  "Cuff Size: Adult Large)   Pulse 69   Temp 98.2  F (36.8  C) (Oral)   Resp 18   Ht 1.803 m (5' 11\")   Wt 98.9 kg (218 lb)   SpO2 97%   BMI 30.40 kg/m     General:   awake, alert, and cooperative.  NAD.   Head: Normocephalic, atraumatic.  Eyes: Conjunctiva clear, non icteric. PERRLA. EOMI.  Nose: No lesions.  Mouth / Throat: Normal dentition.  No oral lesions. Pharynx non erythematous, tonsils without hypertrophy. Tongue midline.  Neck: Supple. ARAMIS grossly.   Heart: Regular rate and rhythm. No murmur.  Lungs: Chest is clear; no wheezes or rales.   Neuro: Alert and oriented - normal speech. Cranial nerves intact.  Normal strength. Using extremities freely.    EKG basically unchanged  My independent read of XR is nonacute      ASSESSMENT:well appearing, very atypical symptoms in intermediate risk patient.  Wells PE score is 0 and no respiratory complaints today.  Will refer for routin cardio f/u [facility] eit has been ten years.      ICD-10-CM    1. Chest pain, unspecified type R07.9 EKG 12-lead complete w/read - Clinics     XR Chest 2 Views   2. Atherosclerosis of native coronary artery of native heart without angina pectoris I25.10 CARDIO  ADULT REFERRAL           PLAN:   Follow up cardio :    Advised about symptoms which might herald more serious problems.              "

## 2019-10-25 DIAGNOSIS — L21.9 SEBORRHEA: ICD-10-CM

## 2019-10-25 NOTE — TELEPHONE ENCOUNTER
"Requested Prescriptions   Pending Prescriptions Disp Refills     ketoconazole (NIZORAL) 2 % external cream 30 g 1     Sig: Apply topically 2 times daily as needed (to seborrhea of face)       Antifungal Agents Passed - 10/25/2019  9:20 AM        Passed - Recent (12 mo) or future (30 days) visit within the authorizing provider's specialty     Patient has had an office visit with the authorizing provider or a provider within the authorizing providers department within the previous 12 mos or has a future within next 30 days. See \"Patient Info\" tab in inbasket, or \"Choose Columns\" in Meds & Orders section of the refill encounter.              Passed - Not Fluconazole or Terconazole      If oral Fluconazole or Terconazole, may refill if indicated in progress notes.           Passed - Medication is active on med list          "

## 2019-10-29 RX ORDER — KETOCONAZOLE 20 MG/G
CREAM TOPICAL 2 TIMES DAILY PRN
Qty: 30 G | Refills: 1 | Status: SHIPPED | OUTPATIENT
Start: 2019-10-29 | End: 2021-08-27

## 2019-10-29 NOTE — TELEPHONE ENCOUNTER
Last Written Prescription Date:  1/13/2017  Last Fill Quantity: 30g,  # refills: 1   Last office visit: 10/1/2019 with prescribing provider:     Future Office Visit:      Routing refill request to provider for review/approval because:  A break in medication: last given in 2017    Marcy Garcia RN

## 2019-11-08 ENCOUNTER — OFFICE VISIT (OUTPATIENT)
Dept: PODIATRY | Facility: CLINIC | Age: 64
End: 2019-11-08
Payer: COMMERCIAL

## 2019-11-08 VITALS — SYSTOLIC BLOOD PRESSURE: 131 MMHG | HEART RATE: 66 BPM | DIASTOLIC BLOOD PRESSURE: 83 MMHG | OXYGEN SATURATION: 95 %

## 2019-11-08 DIAGNOSIS — M21.611 BUNION, RIGHT FOOT: Primary | ICD-10-CM

## 2019-11-08 PROCEDURE — 99202 OFFICE O/P NEW SF 15 MIN: CPT | Performed by: PODIATRIST

## 2019-11-08 NOTE — NURSING NOTE
Grey Markham's chief complaint for this visit includes:  Chief Complaint   Patient presents with     Right Foot - Pain, Bunion     PCP: Navin Euceda    Referring Provider:  No referring provider defined for this encounter.    /83 (BP Location: Left arm, Patient Position: Sitting, Cuff Size: Adult Regular)   Pulse 66   SpO2 95%   Data Unavailable     Do you need any medication refills at today's visit? No    Stefani Ruth LPN

## 2019-11-08 NOTE — PROGRESS NOTES
Past Medical History:   Diagnosis Date     Coronary atherosclerosis of native coronary artery 12/30/09    10%  mid-LAD lesion, Dr. Mendoza     DDD (degenerative disc disease), lumbar     L-4-L5, L5-S1     GERD (gastroesophageal reflux disease) 11/2007    no Rose's     Glaucoma      Glaucoma      Hyperlipidemia LDL goal <100     hyper-TG, on Simv 80mg since at least 1/4/10     Oral herpes      Patient Active Problem List   Diagnosis     Gastroesophageal reflux disease without esophagitis     Atherosclerosis of native coronary artery of native heart without angina pectoris     Hyperlipidemia LDL goal <100     Oral herpes     Anxiety     Advance care planning     Glaucoma     Obesity, Class I, BMI 30-34.9     Elevated prostate specific antigen (PSA)     Dysuria     Photophobia of both eyes     Combined arterial insufficiency and corporo-venous occlusive erectile dysfunction     Past Surgical History:   Procedure Laterality Date     ARTHROSCOPY KNEE RT/LT  2/22/08    RT MMT, Dr. Noonan     C GLAUCOMA SURG,IRIDENCLEISIS  4/03    LASER     C GLAUCOMA SURG,IRIDENCLEISIS  6/06    laser     CARDIAC CATHERIZATION  12/30/09    10% stenosis of mid LAD, EF 70%, nl otherwise Dr. Elliot Mendoza, [facility]     CATARACT IOL, RT/LT Bilateral 5/26/16 (right), June 2016 (left)     COLONOSCOPY WITH CO2 INSUFFLATION N/A 7/9/2018    Procedure: COLONOSCOPY WITH CO2 INSUFFLATION;  Screen for colon cancer;  Surgeon: Navin Vasquez DO;  Location: [facility]     COMBINED ESOPHAGOSCOPY, GASTROSCOPY, DUODENOSCOPY (EGD) WITH CO2 INSUFFLATION N/A 6/14/2017    Procedure: COMBINED ESOPHAGOSCOPY, GASTROSCOPY, DUODENOSCOPY (EGD) WITH CO2 INSUFFLATION;  EGD-GASTROESOPHAGEAL REFLUX DISEASE WITHOUT ESOPHAGITIS/ DARNELL;  Surgeon: Navin Vasquez DO;  Location: [facility]     ESOPHAGOSCOPY, GASTROSCOPY, DUODENOSCOPY (EGD), COMBINED N/A 6/14/2017    Procedure: COMBINED ESOPHAGOSCOPY, GASTROSCOPY, DUODENOSCOPY (EGD), BIOPSY SINGLE OR MULTIPLE;;  Surgeon: Pedro  Navin CHRISTINE DO;  Location: [facility] OR     GLAUCOMA SURGERY      ahmed valve x 1 le x 2 re     GLAUCOMA SURGERY Right 6/16/2015    trabeculectomy with [facility]     HERNIA REPAIR, UMBILICAL  age 6     LASER SURGERY OF EYE      6-7 lasers [facility] glaucoma     LASER SURGERY OF EYE Right 05/05/2015    [facility]     PROSTATE BIOPSY, NEEDLE, SATURATION SAMPLING  12/04/14    Dr. Zaragoza     Social History     Socioeconomic History     Marital status: Single     Spouse name: Not on file     Number of children: 0     Years of education: 14     Highest education level: Not on file   Occupational History     Not on file   Social Needs     Financial resource strain: Not on file     Food insecurity:     Worry: Not on file     Inability: Not on file     Transportation needs:     Medical: Not on file     Non-medical: Not on file   Tobacco Use     Smoking status: Never Smoker     Smokeless tobacco: Never Used   Substance and Sexual Activity     Alcohol use: Yes     Comment: weekends     Drug use: No     Sexual activity: Yes     Partners: Female   Lifestyle     Physical activity:     Days per week: Not on file     Minutes per session: Not on file     Stress: Not on file   Relationships     Social connections:     Talks on phone: Not on file     Gets together: Not on file     Attends Mandaen service: Not on file     Active member of club or organization: Not on file     Attends meetings of clubs or organizations: Not on file     Relationship status: Not on file     Intimate partner violence:     Fear of current or ex partner: Not on file     Emotionally abused: Not on file     Physically abused: Not on file     Forced sexual activity: Not on file   Other Topics Concern     Parent/sibling w/ CABG, MI or angioplasty before 65F 55M? Not Asked      Service No     Blood Transfusions No     Caffeine Concern No     Occupational Exposure No     Hobby Hazards No     Sleep Concern No     Stress Concern Yes     Comment: anxiety     Weight Concern Yes      Comment: patient is concerned about weight gain     Special Diet No     Back Care Yes     Exercise Yes     Comment: regularly     Bike Helmet No     Seat Belt Yes     Self-Exams Yes     Comment: occasionally   Social History Narrative    ** Merged History Encounter **          Family History   Problem Relation Age of Onset     Cancer Father          lymphoma age 73     Glaucoma Father      Glaucoma Mother 87     Cerebrovascular Disease Mother      Cancer Maternal Grandmother      Cancer Other         aunt liver cancer     Diabetes Other         mother's relatives     C.A.D. No family hx of      Macular Degeneration No family hx of      Hypertension No family hx of      Thyroid Disease No family hx of      Anesthesia Reaction No family hx of      Bleeding Disorder No family hx of      Thrombophilia No family hx of      SUBJECTIVE:  This 63-year-old male presents for right foot bunion. He relates in , he had a football injury on his right foot and then his toe kind of started to turn over and the bunion started sticking out.  He relates in the last year it has been hurting intermittently and itches at times.  He feels it is getting bigger.  Relates no recent injuries, no specific treatment.  No other specific relieving or aggravating factors.      OBJECTIVE FINDINGS:  DP and PT are 2/4 right.  He has a right laterally deviated hallux dorsomedial first MPJ prominence. He has hyperkeratotic tissue buildup at the plantar medial first MPJ.  There are dorsally contracted digits 2 through 5 on the right.  There are no gross tendon voids.  There is no erythema, no drainage, no odor, no calor.  He relates it hurts on the dorsal medial first MPJ prominence, when it hurts.      ASSESSMENT AND PLAN:  Hallux abductovalgus, right foot.  Diagnosis and treatment options discussed with the patient.  He was advised on stretching, shoe gear, and ice.  Discussed with him orthotic use.  He related he wanted to review his surgical  options.  I gave him a referral to Dr. Huerta for any surgical options and he will return to clinic and see me as needed if he wants further treatment options.

## 2019-11-08 NOTE — LETTER
11/8/2019         RE: Grey Markham  [address]  [place] [address]        Dear Colleague,    Thank you for referring your patient, Grey Markham, to the [facility]. Please see a copy of my visit note below.    Past Medical History:   Diagnosis Date     Coronary atherosclerosis of native coronary artery 12/30/09    10%  mid-LAD lesion, Dr. Mendoza     DDD (degenerative disc disease), lumbar     L-4-L5, L5-S1     GERD (gastroesophageal reflux disease) 11/2007    no Rose's     Glaucoma      Glaucoma      Hyperlipidemia LDL goal <100     hyper-TG, on Simv 80mg since at least 1/4/10     Oral herpes      Patient Active Problem List   Diagnosis     Gastroesophageal reflux disease without esophagitis     Atherosclerosis of native coronary artery of native heart without angina pectoris     Hyperlipidemia LDL goal <100     Oral herpes     Anxiety     Advance care planning     Glaucoma     Obesity, Class I, BMI 30-34.9     Elevated prostate specific antigen (PSA)     Dysuria     Photophobia of both eyes     Combined arterial insufficiency and corporo-venous occlusive erectile dysfunction     Past Surgical History:   Procedure Laterality Date     ARTHROSCOPY KNEE RT/LT  2/22/08    RT MMT, Dr. Noonan     C GLAUCOMA SURG,IRIDENCLEISIS  4/03    LASER     C GLAUCOMA SURG,IRIDENCLEISIS  6/06    laser     CARDIAC CATHERIZATION  12/30/09    10% stenosis of mid LAD, EF 70%, nl otherwise Dr. Elliot Mendoza, [facility]     CATARACT IOL, RT/LT Bilateral 5/26/16 (right), June 2016 (left)     COLONOSCOPY WITH CO2 INSUFFLATION N/A 7/9/2018    Procedure: COLONOSCOPY WITH CO2 INSUFFLATION;  Screen for colon cancer;  Surgeon: Navin Vasquez DO;  [facility]     COMBINED ESOPHAGOSCOPY, GASTROSCOPY, DUODENOSCOPY (EGD) WITH CO2 INSUFFLATION N/A 6/14/2017    Procedure: COMBINED ESOPHAGOSCOPY, GASTROSCOPY, DUODENOSCOPY (EGD) WITH CO2 INSUFFLATION;  EGD-GASTROESOPHAGEAL REFLUX DISEASE WITHOUT  ESOPHAGITIS/ DARNELL;  Surgeon: Navin Vasquez DO;  Location: [facility]     ESOPHAGOSCOPY, GASTROSCOPY, DUODENOSCOPY (EGD), COMBINED N/A 6/14/2017    Procedure: COMBINED ESOPHAGOSCOPY, GASTROSCOPY, DUODENOSCOPY (EGD), BIOPSY SINGLE OR MULTIPLE;;  Surgeon: Navin Vasquez DO;  Location: [facility]     GLAUCOMA SURGERY      ahmed valve x 1 le x 2 re     GLAUCOMA SURGERY Right 6/16/2015    trabeculectomy with [facility]     HERNIA REPAIR, UMBILICAL  age 6     LASER SURGERY OF EYE      6-7 lasers [facility] glaucoma     LASER SURGERY OF EYE Right 05/05/2015    [facility]     PROSTATE BIOPSY, NEEDLE, SATURATION SAMPLING  12/04/14    Dr. Zaragoza     Social History     Socioeconomic History     Marital status: Single     Spouse name: Not on file     Number of children: 0     Years of education: 14     Highest education level: Not on file   Occupational History     Not on file   Social Needs     Financial resource strain: Not on file     Food insecurity:     Worry: Not on file     Inability: Not on file     Transportation needs:     Medical: Not on file     Non-medical: Not on file   Tobacco Use     Smoking status: Never Smoker     Smokeless tobacco: Never Used   Substance and Sexual Activity     Alcohol use: Yes     Comment: weekends     Drug use: No     Sexual activity: Yes     Partners: Female   Lifestyle     Physical activity:     Days per week: Not on file     Minutes per session: Not on file     Stress: Not on file   Relationships     Social connections:     Talks on phone: Not on file     Gets together: Not on file     Attends Jain service: Not on file     Active member of club or organization: Not on file     Attends meetings of clubs or organizations: Not on file     Relationship status: Not on file     Intimate partner violence:     Fear of current or ex partner: Not on file     Emotionally abused: Not on file     Physically abused: Not on file     Forced sexual activity: Not on file   Other Topics Concern     Parent/sibling w/ CABG,  MI or angioplasty before 65F 55M? Not Asked      Service No     Blood Transfusions No     Caffeine Concern No     Occupational Exposure No     Hobby Hazards No     Sleep Concern No     Stress Concern Yes     Comment: anxiety     Weight Concern Yes     Comment: patient is concerned about weight gain     Special Diet No     Back Care Yes     Exercise Yes     Comment: regularly     Bike Helmet No     Seat Belt Yes     Self-Exams Yes     Comment: occasionally   Social History Narrative    ** Merged History Encounter **          Family History   Problem Relation Age of Onset     Cancer Father          lymphoma age 73     Glaucoma Father      Glaucoma Mother 87     Cerebrovascular Disease Mother      Cancer Maternal Grandmother      Cancer Other         aunt liver cancer     Diabetes Other         mother's relatives     C.A.D. No family hx of      Macular Degeneration No family hx of      Hypertension No family hx of      Thyroid Disease No family hx of      Anesthesia Reaction No family hx of      Bleeding Disorder No family hx of      Thrombophilia No family hx of      SUBJECTIVE:  This 63-year-old male presents for right foot bunion. He relates in , he had a football injury on his right foot and then his toe kind of started to turn over and the bunion started sticking out.  He relates in the last year it has been hurting intermittently and itches at times.  He feels it is getting bigger.  Relates no recent injuries, no specific treatment.  No other specific relieving or aggravating factors.      OBJECTIVE FINDINGS:  DP and PT are 2/4 right.  He has a right laterally deviated hallux dorsomedial first MPJ prominence. He has hyperkeratotic tissue buildup at the plantar medial first MPJ.  There are dorsally contracted digits 2 through 5 on the right.  There are no gross tendon voids.  There is no erythema, no drainage, no odor, no calor.  He relates it hurts on the dorsal medial first MPJ prominence, when  it hurts.      ASSESSMENT AND PLAN:  Hallux abductovalgus, right foot.  Diagnosis and treatment options discussed with the patient.  He was advised on stretching, shoe gear, and ice.  Discussed with him orthotic use.  He related he wanted to review his surgical options.  I gave him a referral to Dr. Huerta for any surgical options and he will return to clinic and see me as needed if he wants further treatment options.         Again, thank you for allowing me to participate in the care of your patient.        Sincerely,        Cruz Sommer DPM

## 2019-12-04 DIAGNOSIS — K21.9 GASTROESOPHAGEAL REFLUX DISEASE WITHOUT ESOPHAGITIS: ICD-10-CM

## 2019-12-04 NOTE — TELEPHONE ENCOUNTER
"Requested Prescriptions   Pending Prescriptions Disp Refills     pantoprazole (PROTONIX) 40 MG EC tablet [Pharmacy Med Name: PANTOPRAZOLE SOD DR 40 MG TAB] 90 tablet 1     Sig: TAKE 1 TABLET BY MOUTH ONE TIME DAILY FOR REFLUX       PPI Protocol Passed - 12/4/2019  1:04 PM        Passed - Not on Clopidogrel (unless Pantoprazole ordered)        Passed - No diagnosis of osteoporosis on record        Passed - Recent (12 mo) or future (30 days) visit within the authorizing provider's specialty     Patient has had an office visit with the authorizing provider or a provider within the authorizing providers department within the previous 12 mos or has a future within next 30 days. See \"Patient Info\" tab in inbasket, or \"Choose Columns\" in Meds & Orders section of the refill encounter.              Passed - Medication is active on med list        Passed - Patient is age 18 or older        Last Written Prescription Date:  6/18/19  Last Fill Quantity: 90,  # refills: 1   Last office visit: 10/1/2019 with prescribing provider:  Navin Euceda     Future Office Visit:      "

## 2019-12-06 RX ORDER — PANTOPRAZOLE SODIUM 40 MG/1
TABLET, DELAYED RELEASE ORAL
Qty: 90 TABLET | Refills: 1 | Status: SHIPPED | OUTPATIENT
Start: 2019-12-06 | End: 2020-05-08

## 2020-01-02 ENCOUNTER — OFFICE VISIT (OUTPATIENT)
Dept: ORTHOPEDICS | Facility: CLINIC | Age: 65
End: 2020-01-02
Payer: COMMERCIAL

## 2020-01-02 VITALS
BODY MASS INDEX: 30.52 KG/M2 | SYSTOLIC BLOOD PRESSURE: 132 MMHG | WEIGHT: 218 LBS | HEIGHT: 71 IN | DIASTOLIC BLOOD PRESSURE: 83 MMHG

## 2020-01-02 DIAGNOSIS — M18.11 PRIMARY OSTEOARTHRITIS OF FIRST CARPOMETACARPAL JOINT OF RIGHT HAND: Primary | ICD-10-CM

## 2020-01-02 PROCEDURE — 20604 DRAIN/INJ JOINT/BURSA W/US: CPT | Mod: RT | Performed by: FAMILY MEDICINE

## 2020-01-02 PROCEDURE — 99207 SMALL JOINT INJECTION/ARTHROCENTESIS: R THUMB CMC: CPT | Performed by: FAMILY MEDICINE

## 2020-01-02 RX ORDER — METHYLPREDNISOLONE ACETATE 40 MG/ML
20 INJECTION, SUSPENSION INTRA-ARTICULAR; INTRALESIONAL; INTRAMUSCULAR; SOFT TISSUE
Status: SHIPPED | OUTPATIENT
Start: 2020-01-02

## 2020-01-02 RX ADMIN — METHYLPREDNISOLONE ACETATE 20 MG: 40 INJECTION, SUSPENSION INTRA-ARTICULAR; INTRALESIONAL; INTRAMUSCULAR; SOFT TISSUE at 09:18

## 2020-01-02 ASSESSMENT — MIFFLIN-ST. JEOR: SCORE: 1805.97

## 2020-01-02 NOTE — PROGRESS NOTES
"HISTORY OF PRESENT ILLNESS  Mr. Markham is a pleasant 64 year old male following up with bilateral CMC osteoarthritis.  Grey last saw me in July of this year.  At that visit we discussed conservative measures to help with his pain.  He has tried heat, ice, immobilization with braces, and Arnica gel.  He is doing well with regards to his left thumb, unfortunately unfortunately his right thumb pain continues.  He is interested in an injection today.     PHYSICAL EXAM  Vitals:    01/02/20 0837   BP: 132/83   Weight: 98.9 kg (218 lb)   Height: 1.803 m (5' 11\")     General  - normal appearance, in no obvious distress  CV  - normal radial pulse  Pulm  - normal respiratory pattern, non-labored  Musculoskeletal - right thumb  - inspection: no atrophy, normal joint alignment, no swelling  - palpation: CMC tenderness, no soft tissue tenderness, no tenderness at the anatomical snuffbox  - ROM:  MCP 70 deg flexion   0 deg extension   IP 90 deg flexion   0 deg extension  Neuro  - no numbness, no motor deficit, grossly normal coordination, normal muscle tone  Skin  - no ecchymosis, erythema, warmth, or induration, no obvious rash  Psych  - interactive, appropriate, normal mood and affect            ASSESSMENT & PLAN  Mr. Markham is a 64 year old male following up with bilateral CMC osteoarthritis, right worse than left.    Grey and I revisited management of CMC osteoarthritis from a global standpoint.  I did perform an injection today (see procedure note).  He does know that this is something that we could repeat up to a few times a year, if needed.  I also gave him the name of our hand surgeons to discuss possible surgical treatment in the future, should injections not help him.    It was a pleasure seeing Grey.    Right Thumb Injection, CMC - Ultrasound Guided  The patient was informed of the risks and the benefits of the procedure and a written consent was signed.  The patient s right thumb was prepped with chlorhexidine in " "sterile fashion.   20 mg of depomedrol suspension was drawn up into a 3 mL syringe with 0.5 mL of 1% lidocaine.  Injection was performed using sterile technique.  Under ultrasound guidance a 1.5-inch 25-gauge needle was used to enter the CMC joint of the right thumb.  Needle placement was visualized and documented with ultrasound.  Needle placed in short axis to the probe.  Ultrasound visualization was necessary due to decreased joint space in the setting of osteoarthritis.  Images were permanently stored for the patient's record.  There were no complications. The patient tolerated the procedure well. There was negligible bleeding.   The patient was instructed to ice the thumb upon leaving clinic and refrain from overuse over the next 3 days.   The patient was instructed to call or go to the emergency room with any unusual pain, swelling, redness, or if otherwise concerned.          Randolph Zuleta DO, CAARLENM      This note was constructed using Dragon dictation software, please excuse any minor errors in spelling, grammar, or syntax.          [facility] FOLLOW-UP VISIT 1/2/2020    Grey Markham's chief complaint for this visit includes:  Chief Complaint   Patient presents with     RECHECK     Right thumb and hand pain, would like to disucss injections and surgery.      PCP: Navin Euceda    Referring Provider:  Allan Cardona MD  [address] [place] [address]    /83   Ht 1.803 m (5' 11\")   Wt 98.9 kg (218 lb)   BMI 30.40 kg/m    Data Unavailable       Interval History:     Follow up reason: right hand/thumb    Following Therapeutic Plan: Yes     Pain: Worsening    Function: Worsening    Medical History:    Any recent changes to your medical history? No    Any new medication prescribed since last visit? No    Review of Systems:    Do you have fever, chills, weight loss? No    Do you have any vision problems? No    Do you have any chest pain or edema? No    Do you have any shortness " of breath or wheezing?  No    Do you have stomach problems? No    Do you have any numbness or focal weakness? No    Do you have diabetes? No    Do you have problems with bleeding or clotting? No    Do you have an rashes or other skin lesions? No    Small Joint Injection/Arthrocentesis: R thumb CMC  Date/Time: 1/2/2020 9:18 AM  Performed by: Randolph Zuleta DO  Authorized by: Randolph Zuleta DO   Indications:  Pain  Needle Size:  22 G  Guidance: ultrasound       Location:  Thumb    Site:  R thumb CMC                    Medications:  20 mg methylPREDNISolone 40 MG/ML        Outcome:  Tolerated well, no immediate complications  Procedure discussed: discussed risks, benefits, and alternatives    Consent Given by:  Patient  Timeout: timeout called immediately prior to procedure    Prep: patient was prepped and draped in usual sterile fashion

## 2020-01-02 NOTE — LETTER
"    1/2/2020         RE: Grey Markham  [address]  [place] [address]        Dear Colleague,    Thank you for referring your patient, Grey Markham, to the [facility]. Please see a copy of my visit note below.    HISTORY OF PRESENT ILLNESS  Mr. Markham is a pleasant 64 year old male following up with bilateral CMC osteoarthritis.  Grey last saw me in July of this year.  At that visit we discussed conservative measures to help with his pain.  He has tried heat, ice, immobilization with braces, and Arnica gel.  He is doing well with regards to his left thumb, unfortunately unfortunately his right thumb pain continues.  He is interested in an injection today.     PHYSICAL EXAM  Vitals:    01/02/20 0837   BP: 132/83   Weight: 98.9 kg (218 lb)   Height: 1.803 m (5' 11\")     General  - normal appearance, in no obvious distress  CV  - normal radial pulse  Pulm  - normal respiratory pattern, non-labored  Musculoskeletal - right thumb  - inspection: no atrophy, normal joint alignment, no swelling  - palpation: CMC tenderness, no soft tissue tenderness, no tenderness at the anatomical snuffbox  - ROM:  MCP 70 deg flexion   0 deg extension   IP 90 deg flexion   0 deg extension  Neuro  - no numbness, no motor deficit, grossly normal coordination, normal muscle tone  Skin  - no ecchymosis, erythema, warmth, or induration, no obvious rash  Psych  - interactive, appropriate, normal mood and affect            ASSESSMENT & PLAN  Mr. Markham is a 64 year old male following up with bilateral CMC osteoarthritis, right worse than left.    Grey and I revisited management of CMC osteoarthritis from a global standpoint.  I did perform an injection today (see procedure note).  He does know that this is something that we could repeat up to a few times a year, if needed.  I also gave him the name of our hand surgeons to discuss possible surgical treatment in the future, should injections not help him.    It " "was a pleasure seeing Grey.    Right Thumb Injection, CMC - Ultrasound Guided  The patient was informed of the risks and the benefits of the procedure and a written consent was signed.  The patient s right thumb was prepped with chlorhexidine in sterile fashion.   20 mg of depomedrol suspension was drawn up into a 3 mL syringe with 0.5 mL of 1% lidocaine.  Injection was performed using sterile technique.  Under ultrasound guidance a 1.5-inch 25-gauge needle was used to enter the CMC joint of the right thumb.  Needle placement was visualized and documented with ultrasound.  Needle placed in short axis to the probe.  Ultrasound visualization was necessary due to decreased joint space in the setting of osteoarthritis.  Images were permanently stored for the patient's record.  There were no complications. The patient tolerated the procedure well. There was negligible bleeding.   The patient was instructed to ice the thumb upon leaving clinic and refrain from overuse over the next 3 days.   The patient was instructed to call or go to the emergency room with any unusual pain, swelling, redness, or if otherwise concerned.          Randolph Zuleta DO, CAQSM      This note was constructed using Dragon dictation software, please excuse any minor errors in spelling, grammar, or syntax.          [facility] FOLLOW-UP VISIT 1/2/2020    Grey Markham's chief complaint for this visit includes:  Chief Complaint   Patient presents with     RECHECK     Right thumb and hand pain, would like to disucss injections and surgery.      PCP: Navin Euceda    Referring Provider:  Allan Cardona MD  [address] [place] [address]    /83   Ht 1.803 m (5' 11\")   Wt 98.9 kg (218 lb)   BMI 30.40 kg/m     Data Unavailable       Interval History:     Follow up reason: right hand/thumb    Following Therapeutic Plan: Yes     Pain: Worsening    Function: Worsening    Medical History:    Any recent changes to your " medical history? No    Any new medication prescribed since last visit? No    Review of Systems:    Do you have fever, chills, weight loss? No    Do you have any vision problems? No    Do you have any chest pain or edema? No    Do you have any shortness of breath or wheezing?  No    Do you have stomach problems? No    Do you have any numbness or focal weakness? No    Do you have diabetes? No    Do you have problems with bleeding or clotting? No    Do you have an rashes or other skin lesions? No    Small Joint Injection/Arthrocentesis: R thumb CMC  Date/Time: 1/2/2020 9:18 AM  Performed by: Randolph Zuleta DO  Authorized by: Randolph Zuleta DO   Indications:  Pain  Needle Size:  22 G  Guidance: ultrasound       Location:  Thumb    Site:  R thumb CMC                    Medications:  20 mg methylPREDNISolone 40 MG/ML        Outcome:  Tolerated well, no immediate complications  Procedure discussed: discussed risks, benefits, and alternatives    Consent Given by:  Patient  Timeout: timeout called immediately prior to procedure    Prep: patient was prepped and draped in usual sterile fashion              Again, thank you for allowing me to participate in the care of your patient.        Sincerely,        Randolph Zuleta DO

## 2020-01-19 DIAGNOSIS — F41.9 ANXIETY: ICD-10-CM

## 2020-01-19 NOTE — TELEPHONE ENCOUNTER
"Requested Prescriptions   Pending Prescriptions Disp Refills     FLUoxetine 20 MG tablet [Pharmacy Med Name: FLUOXETINE HCL 20 MG TABLET] 90 tablet 1     Sig: TAKE 1 TABLET (20 MG) BY MOUTH DAILY FOR ANXIETY PREVENTION.         Last Written Prescription Date:  6/18/19  Last Fill Quantity: 90,  # refills: 1   Last Office Visit with [facility], P or [facility] prescribing provider:  10/01/19   Future Office Visit:         SSRIs Protocol Passed - 1/19/2020  1:14 AM        Passed - Recent (12 mo) or future (30 days) visit within the authorizing provider's specialty     Patient has had an office visit with the authorizing provider or a provider within the authorizing providers department within the previous 12 mos or has a future within next 30 days. See \"Patient Info\" tab in inbasket, or \"Choose Columns\" in Meds & Orders section of the refill encounter.              Passed - Medication is active on med list        Passed - Patient is age 18 or older              Christopher Faarax  [facility]  "

## 2020-01-20 RX ORDER — FLUOXETINE 20 MG/1
20 TABLET, FILM COATED ORAL DAILY
Qty: 90 TABLET | Refills: 1 | Status: SHIPPED | OUTPATIENT
Start: 2020-01-20 | End: 2020-08-05

## 2020-01-20 NOTE — TELEPHONE ENCOUNTER
Prescription approved per [facility] Refill Protocol.  Aisha Mark RN on 1/20/2020 at 4:38 PM

## 2020-05-06 DIAGNOSIS — K21.9 GASTROESOPHAGEAL REFLUX DISEASE WITHOUT ESOPHAGITIS: ICD-10-CM

## 2020-05-08 RX ORDER — PANTOPRAZOLE SODIUM 40 MG/1
TABLET, DELAYED RELEASE ORAL
Qty: 90 TABLET | Refills: 0 | Status: SHIPPED | OUTPATIENT
Start: 2020-05-08 | End: 2020-08-05

## 2020-05-08 NOTE — TELEPHONE ENCOUNTER
Prescription approved per [facility] Refill Protocol.      Saroj John RN, BSN, PHN

## 2020-06-12 DIAGNOSIS — E78.5 HYPERLIPIDEMIA LDL GOAL <100: ICD-10-CM

## 2020-06-12 DIAGNOSIS — I25.10 ATHEROSCLEROSIS OF NATIVE CORONARY ARTERY OF NATIVE HEART WITHOUT ANGINA PECTORIS: ICD-10-CM

## 2020-06-19 NOTE — TELEPHONE ENCOUNTER
This writer attempted to contact pt on 06/19/20      Reason for call schedule virtual visit and left message.      If patient calls back:   Schedule virtual appointment within 2 weeks with primary care, document that pt called and Please ask if there is enough medication/supplies to last till scheduled appointment, then route back to RN refill kami Thomson

## 2020-06-23 NOTE — TELEPHONE ENCOUNTER
This writer attempted to contact pt on 06/23/20      Reason for call schedule virtual visit and left message.      If patient calls back:   Schedule virtual appointment within 1 week with primary care, document that pt called and close encounter         Priti Peña MA

## 2020-06-25 RX ORDER — ATORVASTATIN CALCIUM 80 MG/1
80 TABLET, FILM COATED ORAL DAILY
Qty: 90 TABLET | Refills: 0 | Status: SHIPPED | OUTPATIENT
Start: 2020-06-25 | End: 2020-08-14

## 2020-06-25 NOTE — TELEPHONE ENCOUNTER
LM for pt to call clinic.  3rd attempt, with no response.  Please advise.      Priti TODD, Patient Care

## 2020-07-05 DIAGNOSIS — R97.20 ELEVATED PROSTATE SPECIFIC ANTIGEN (PSA): ICD-10-CM

## 2020-07-05 DIAGNOSIS — Z86.39 HISTORY OF ELEVATED GLUCOSE: ICD-10-CM

## 2020-07-05 DIAGNOSIS — E78.5 HYPERLIPIDEMIA LDL GOAL <100: Primary | ICD-10-CM

## 2020-07-05 DIAGNOSIS — I25.10 ATHEROSCLEROSIS OF NATIVE CORONARY ARTERY OF NATIVE HEART WITHOUT ANGINA PECTORIS: ICD-10-CM

## 2020-07-05 DIAGNOSIS — B00.2 HERPETIC GINGIVOSTOMATITIS: ICD-10-CM

## 2020-07-11 RX ORDER — VALACYCLOVIR HYDROCHLORIDE 500 MG/1
TABLET, FILM COATED ORAL
Qty: 20 TABLET | Refills: 1 | Status: SHIPPED | OUTPATIENT
Start: 2020-07-11 | End: 2020-08-14

## 2020-07-11 NOTE — TELEPHONE ENCOUNTER
Please assist in scheduling his [ARYA] -- or -- [fasting lab appointment prior to virtual visit] before 9/25/20.

## 2020-07-15 NOTE — TELEPHONE ENCOUNTER
This writer attempted to contact Patient on 07/14/20      Reason for call Appointments  and left detailed message.      If patient calls back:   Schedule lab and physical appointment asap with primary care and lab, document that pt called and close encounter         Tana Branch

## 2020-08-03 DIAGNOSIS — K21.9 GASTROESOPHAGEAL REFLUX DISEASE WITHOUT ESOPHAGITIS: ICD-10-CM

## 2020-08-03 DIAGNOSIS — F41.9 ANXIETY: ICD-10-CM

## 2020-08-05 RX ORDER — FLUOXETINE 20 MG/1
20 TABLET, FILM COATED ORAL DAILY
Qty: 90 TABLET | Refills: 0 | Status: SHIPPED | OUTPATIENT
Start: 2020-08-05 | End: 2020-08-14

## 2020-08-05 RX ORDER — PANTOPRAZOLE SODIUM 40 MG/1
TABLET, DELAYED RELEASE ORAL
Qty: 90 TABLET | Refills: 0 | Status: SHIPPED | OUTPATIENT
Start: 2020-08-05 | End: 2020-08-14

## 2020-08-05 NOTE — TELEPHONE ENCOUNTER
Prescription approved per [facility] Refill Protocol.    Pao Cutler RN, [facility]

## 2020-08-14 ENCOUNTER — OFFICE VISIT (OUTPATIENT)
Dept: FAMILY MEDICINE | Facility: CLINIC | Age: 65
End: 2020-08-14
Payer: COMMERCIAL

## 2020-08-14 VITALS
TEMPERATURE: 98.4 F | HEIGHT: 71 IN | WEIGHT: 209.4 LBS | HEART RATE: 73 BPM | OXYGEN SATURATION: 97 % | RESPIRATION RATE: 18 BRPM | BODY MASS INDEX: 29.31 KG/M2 | SYSTOLIC BLOOD PRESSURE: 135 MMHG | DIASTOLIC BLOOD PRESSURE: 88 MMHG

## 2020-08-14 DIAGNOSIS — Z86.39 HISTORY OF ELEVATED GLUCOSE: ICD-10-CM

## 2020-08-14 DIAGNOSIS — Z13.1 SCREENING FOR DIABETES MELLITUS: ICD-10-CM

## 2020-08-14 DIAGNOSIS — N52.03 COMBINED ARTERIAL INSUFFICIENCY AND CORPORO-VENOUS OCCLUSIVE ERECTILE DYSFUNCTION: ICD-10-CM

## 2020-08-14 DIAGNOSIS — R97.20 ELEVATED PROSTATE SPECIFIC ANTIGEN (PSA): ICD-10-CM

## 2020-08-14 DIAGNOSIS — N40.1 BENIGN PROSTATIC HYPERPLASIA WITH LOWER URINARY TRACT SYMPTOMS, SYMPTOM DETAILS UNSPECIFIED: ICD-10-CM

## 2020-08-14 DIAGNOSIS — E78.5 HYPERLIPIDEMIA LDL GOAL <100: ICD-10-CM

## 2020-08-14 DIAGNOSIS — F41.9 ANXIETY: ICD-10-CM

## 2020-08-14 DIAGNOSIS — K21.9 GASTROESOPHAGEAL REFLUX DISEASE WITHOUT ESOPHAGITIS: ICD-10-CM

## 2020-08-14 DIAGNOSIS — B00.2 HERPETIC GINGIVOSTOMATITIS: ICD-10-CM

## 2020-08-14 DIAGNOSIS — Z00.00 ROUTINE GENERAL MEDICAL EXAMINATION AT A HEALTH CARE FACILITY: Primary | ICD-10-CM

## 2020-08-14 DIAGNOSIS — I25.10 ATHEROSCLEROSIS OF NATIVE CORONARY ARTERY OF NATIVE HEART WITHOUT ANGINA PECTORIS: ICD-10-CM

## 2020-08-14 LAB
ALBUMIN SERPL-MCNC: 3.7 G/DL (ref 3.4–5)
ALP SERPL-CCNC: 91 U/L (ref 40–150)
ALT SERPL W P-5'-P-CCNC: 53 U/L (ref 0–70)
ANION GAP SERPL CALCULATED.3IONS-SCNC: 5 MMOL/L (ref 3–14)
AST SERPL W P-5'-P-CCNC: 27 U/L (ref 0–45)
BILIRUB SERPL-MCNC: 0.4 MG/DL (ref 0.2–1.3)
BUN SERPL-MCNC: 16 MG/DL (ref 7–30)
CALCIUM SERPL-MCNC: 8.8 MG/DL (ref 8.5–10.1)
CHLORIDE SERPL-SCNC: 109 MMOL/L (ref 94–109)
CHOLEST SERPL-MCNC: 169 MG/DL
CO2 SERPL-SCNC: 27 MMOL/L (ref 20–32)
CREAT SERPL-MCNC: 0.99 MG/DL (ref 0.66–1.25)
GFR SERPL CREATININE-BSD FRML MDRD: 80 ML/MIN/{1.73_M2}
GLUCOSE SERPL-MCNC: 109 MG/DL (ref 70–99)
HBA1C MFR BLD: 5.6 % (ref 0–5.6)
HDLC SERPL-MCNC: 73 MG/DL
LDLC SERPL CALC-MCNC: 66 MG/DL
NONHDLC SERPL-MCNC: 96 MG/DL
POTASSIUM SERPL-SCNC: 4.6 MMOL/L (ref 3.4–5.3)
PROT SERPL-MCNC: 7.2 G/DL (ref 6.8–8.8)
PSA SERPL-MCNC: 14.1 UG/L (ref 0–4)
SODIUM SERPL-SCNC: 141 MMOL/L (ref 133–144)
TRIGL SERPL-MCNC: 148 MG/DL

## 2020-08-14 PROCEDURE — 83036 HEMOGLOBIN GLYCOSYLATED A1C: CPT | Performed by: FAMILY MEDICINE

## 2020-08-14 PROCEDURE — 80061 LIPID PANEL: CPT | Performed by: FAMILY MEDICINE

## 2020-08-14 PROCEDURE — 36415 COLL VENOUS BLD VENIPUNCTURE: CPT | Performed by: FAMILY MEDICINE

## 2020-08-14 PROCEDURE — 84153 ASSAY OF PSA TOTAL: CPT | Performed by: FAMILY MEDICINE

## 2020-08-14 PROCEDURE — 80053 COMPREHEN METABOLIC PANEL: CPT | Performed by: FAMILY MEDICINE

## 2020-08-14 PROCEDURE — 99396 PREV VISIT EST AGE 40-64: CPT | Performed by: FAMILY MEDICINE

## 2020-08-14 RX ORDER — PANTOPRAZOLE SODIUM 40 MG/1
TABLET, DELAYED RELEASE ORAL
Qty: 90 TABLET | Refills: 3 | Status: SHIPPED | OUTPATIENT
Start: 2020-08-14 | End: 2021-08-23

## 2020-08-14 RX ORDER — ATORVASTATIN CALCIUM 80 MG/1
80 TABLET, FILM COATED ORAL DAILY
Qty: 90 TABLET | Refills: 3 | Status: SHIPPED | OUTPATIENT
Start: 2020-08-14 | End: 2021-08-26

## 2020-08-14 RX ORDER — FLUOXETINE 20 MG/1
20 TABLET, FILM COATED ORAL DAILY
Qty: 90 TABLET | Refills: 3 | Status: SHIPPED | OUTPATIENT
Start: 2020-08-14 | End: 2021-08-26

## 2020-08-14 RX ORDER — VALACYCLOVIR HYDROCHLORIDE 500 MG/1
TABLET, FILM COATED ORAL
Qty: 20 TABLET | Refills: 1 | Status: SHIPPED | OUTPATIENT
Start: 2020-08-14 | End: 2021-05-06

## 2020-08-14 RX ORDER — SILDENAFIL CITRATE 20 MG/1
TABLET ORAL
Qty: 30 TABLET | Refills: 4 | Status: SHIPPED | OUTPATIENT
Start: 2020-08-14 | End: 2020-12-01

## 2020-08-14 RX ORDER — TAMSULOSIN HYDROCHLORIDE 0.4 MG/1
0.4 CAPSULE ORAL DAILY
Qty: 90 CAPSULE | Refills: 3 | Status: SHIPPED | OUTPATIENT
Start: 2020-08-14 | End: 2021-01-12 | Stop reason: SINTOL

## 2020-08-14 ASSESSMENT — PAIN SCALES - GENERAL: PAINLEVEL: NO PAIN (0)

## 2020-08-14 ASSESSMENT — MIFFLIN-ST. JEOR: SCORE: 1759.02

## 2020-08-14 NOTE — PROGRESS NOTES
3  SUBJECTIVE:   CC: Grey Markham is an 64 year old male who presents for preventive health visit.     Healthy Habits:    Do you get at least three servings of calcium containing foods daily (dairy, green leafy vegetables, etc.)? yes    Amount of exercise or daily activities, outside of work: 5 day(s) per week    Problems taking medications regularly No    Medication side effects: No    Have you had an eye exam in the past two years? yes    Do you see a dentist twice per year? yes    Do you have sleep apnea, excessive snoring or daytime drowsiness?snoring    Today's PHQ-2 Score:   PHQ-2 ( 1999 [employer]) 1/2/2020 11/8/2019   Q1: Little interest or pleasure in doing things 0 0   Q2: Feeling down, depressed or hopeless 0 0   PHQ-2 Score 0 0       Abuse: Current or Past(Physical, Sexual or Emotional)- No  Do you feel safe in your environment? Yes    Have you ever done Advance Care Planning? (For example, a Health Directive, POLST, or a discussion with a medical provider or your loved ones about your wishes): No, advance care planning information given to patient to review.  Patient plans to discuss their wishes with loved ones or provider.      Social History     Tobacco Use     Smoking status: Never Smoker     Smokeless tobacco: Never Used   Substance Use Topics     Alcohol use: Yes     Comment: weekends     If you drink alcohol do you typically have >3 drinks per day or >7 drinks per week? No                      Last PSA:   PSA   Date Value Ref Range Status   06/18/2019 9.85 (H) 0 - 4 ug/L Final     Comment:     Assay Method:  Chemiluminescence using Siemens Vista analyzer       Reviewed orders with patient. Reviewed health maintenance and updated orders accordingly - Yes  Lab work is in process  BP Readings from Last 3 Encounters:   08/14/20 135/88   01/02/20 132/83   11/08/19 131/83    Wt Readings from Last 3 Encounters:   08/14/20 95 kg (209 lb 6.4 oz)   01/02/20 98.9 kg (218 lb)   10/01/19 98.9 kg (218 lb)                   Patient Active Problem List   Diagnosis     Gastroesophageal reflux disease without esophagitis     Atherosclerosis of native coronary artery of native heart without angina pectoris     Hyperlipidemia LDL goal <100     Oral herpes     Anxiety     Advance care planning     Glaucoma     Obesity, Class I, BMI 30-34.9     Elevated prostate specific antigen (PSA)     Dysuria     Photophobia of both eyes     Combined arterial insufficiency and corporo-venous occlusive erectile dysfunction     Past Surgical History:   Procedure Laterality Date     ARTHROSCOPY KNEE RT/LT  2/22/08    RT [facility], Dr. Shaista HADDAD GLAUCOMA SURG,IRIDENCLEISIS  4/03    LASER     C GLAUCOMA SURG,IRIDENCLEISIS  6/06    laser     CARDIAC CATHERIZATION  12/30/09    10% stenosis of mid LAD, EF 70%, nl otherwise Dr. Elliot Mendoza, [facility]     CATARACT IOL, RT/LT Bilateral 5/26/16 (right), June 2016 (left)     COLONOSCOPY WITH CO2 INSUFFLATION N/A 7/9/2018    Procedure: COLONOSCOPY WITH CO2 INSUFFLATION;  Screen for colon cancer;  Surgeon: Navin Vasquez DO;  Location: [facility] OR     COMBINED ESOPHAGOSCOPY, GASTROSCOPY, DUODENOSCOPY (EGD) WITH CO2 INSUFFLATION N/A 6/14/2017    Procedure: COMBINED ESOPHAGOSCOPY, GASTROSCOPY, DUODENOSCOPY (EGD) WITH CO2 INSUFFLATION;  EGD-GASTROESOPHAGEAL REFLUX DISEASE WITHOUT ESOPHAGITIS/ DARNELL;  Surgeon: Navin Vasquez DO;  Location: [facility] OR     ESOPHAGOSCOPY, GASTROSCOPY, DUODENOSCOPY (EGD), COMBINED N/A 6/14/2017    Procedure: COMBINED ESOPHAGOSCOPY, GASTROSCOPY, DUODENOSCOPY (EGD), BIOPSY SINGLE OR MULTIPLE;;  Surgeon: Navin Vasquez DO;  Location: [facility] OR     GLAUCOMA SURGERY      ahmed valve x 1 le x 2 re     GLAUCOMA SURGERY Right 6/16/2015    trabeculectomy with [facility]     HERNIA REPAIR, UMBILICAL  age 6     LASER SURGERY OF EYE      6-7 lasers [facility] glaucoma     LASER SURGERY OF EYE Right 05/05/2015    [facility]     PROSTATE BIOPSY, NEEDLE, SATURATION SAMPLING  12/04/14    Dr. Zaragoza       Social History      Tobacco Use     Smoking status: Never Smoker     Smokeless tobacco: Never Used   Substance Use Topics     Alcohol use: Yes     Comment: weekends     Family History   Problem Relation Age of Onset     Cancer Father          lymphoma age 73     Glaucoma Father      Glaucoma Mother 87     Cerebrovascular Disease Mother      Cancer Maternal Grandmother      Cancer Other         aunt liver cancer     Diabetes Other         mother's relatives     C.A.D. No family hx of      Macular Degeneration No family hx of      Hypertension No family hx of      Thyroid Disease No family hx of      Anesthesia Reaction No family hx of      Bleeding Disorder No family hx of      Thrombophilia No family hx of          Current Outpatient Medications   Medication Sig Dispense Refill     aspirin 81 MG tablet Take 1 tablet by mouth daily. * 100 tablet prn     atorvastatin (LIPITOR) 80 MG tablet Take 1 tablet (80 mg) by mouth daily for cholesterol. 90 tablet 3     FLUoxetine 20 MG tablet Take 1 tablet (20 mg) by mouth daily for anxiety prevention. 90 tablet 3     ketoconazole (NIZORAL) 2 % external cream Apply topically 2 times daily as needed (to seborrhea of face) 30 g 1     pantoprazole (PROTONIX) 40 MG EC tablet TAKE 1 TABLET BY MOUTH ONE TIME DAILY FOR REFLUX 90 tablet 3     sildenafil (REVATIO) 20 MG tablet TAKE 1-5 TABS BY MOUTH DAILY AS NEEDED FOR ED. TAKE 1-3 HRS BE4 INTERCOURSE. MAX 1 DOSE/24HRS 30 tablet 4     sulindac (CLINORIL) 150 MG tablet Take 1 tablet by mouth 2 times daily as needed. for wrist pain. Take with food. 180 tablet 0     tamsulosin (FLOMAX) 0.4 MG capsule Take 1 capsule (0.4 mg) by mouth daily 90 capsule 3     valACYclovir (VALTREX) 500 MG tablet TAKE ONE TABLET BY MOUTH EVERY TWELVE HOURS FOR 5 DAYS. START AT EARLIEST ONSET OF SYMPTOMS 20 tablet 1     Allergies   Allergen Reactions     Gemfibrozil Diarrhea     No Clinical Screening - See Comments      All glaucoma drops except travatan     Pilocarpine  "Hydrochloride      Eye drops     Timoptic [Timolol Maleate]      Eye drops only       Reviewed and updated as needed this visit by clinical staff         Reviewed and updated as needed this visit by Provider            ROS:  CONSTITUTIONAL: NEGATIVE for fever, chills, change in weight  INTEGUMENTARY/SKIN: NEGATIVE for worrisome rashes, moles or lesions  EYES: NEGATIVE for vision changes or irritation  ENT: NEGATIVE for ear, mouth and throat problems  RESP: NEGATIVE for significant cough or SOB  CV: NEGATIVE for chest pain, palpitations or peripheral edema  GI: NEGATIVE for nausea, abdominal pain, heartburn, or change in bowel habits   male: negative for dysuria, hematuria, decreased urinary stream, erectile dysfunction, urethral discharge  MUSCULOSKELETAL: NEGATIVE for significant arthralgias or myalgia  NEURO: NEGATIVE for weakness, dizziness or paresthesias  PSYCHIATRIC: NEGATIVE for changes in mood or affect    OBJECTIVE:   /88   Pulse 73   Temp 98.4  F (36.9  C) (Oral)   Resp 18   Ht 1.791 m (5' 10.5\")   Wt 95 kg (209 lb 6.4 oz)   SpO2 97%   BMI 29.62 kg/m    EXAM:  GENERAL: healthy, alert and no distress  NECK: no adenopathy, no asymmetry, masses, or scars and thyroid normal to palpation  RESP: lungs clear to auscultation - no rales, rhonchi or wheezes  CV: regular rate and rhythm, normal S1 S2, no S3 or S4, no murmur, click or rub, no peripheral edema and peripheral pulses strong  ABDOMEN: soft, nontender, no hepatosplenomegaly, no masses and bowel sounds normal  MS: no gross musculoskeletal defects noted, no edema    Diagnostic Test Results:  Labs reviewed in Epic    ASSESSMENT/PLAN:   1. Routine general medical examination at a health care facility  As below.    2. Hyperlipidemia LDL goal <100  Controlled.  - Comprehensive metabolic panel (BMP + Alb, Alk Phos, ALT, AST, Total. Bili, TP)  - Lipid panel reflex to direct LDL Fasting  - atorvastatin (LIPITOR) 80 MG tablet; Take 1 tablet (80 mg) by " mouth daily for cholesterol.  Dispense: 90 tablet; Refill: 3    3. Atherosclerosis of native coronary artery of native heart without angina pectoris  Asymptomatic.  - Comprehensive metabolic panel (BMP + Alb, Alk Phos, ALT, AST, Total. Bili, TP)  - atorvastatin (LIPITOR) 80 MG tablet; Take 1 tablet (80 mg) by mouth daily for cholesterol.  Dispense: 90 tablet; Refill: 3    4. History of elevated glucose  Discussed weight goal.  - Comprehensive metabolic panel (BMP + Alb, Alk Phos, ALT, AST, Total. Bili, TP)  - Hemoglobin A1c    5. Screening for diabetes mellitus  Discussed weight goal.  - Comprehensive metabolic panel (BMP + Alb, Alk Phos, ALT, AST, Total. Bili, TP)    6. Elevated prostate specific antigen (PSA)    - PSA, tumor marker  - UROLOGY ADULT REFERRAL    7. Anxiety  Stable.  - FLUoxetine 20 MG tablet; Take 1 tablet (20 mg) by mouth daily for anxiety prevention.  Dispense: 90 tablet; Refill: 3    8. Gastroesophageal reflux disease without esophagitis  Controlled.  - pantoprazole (PROTONIX) 40 MG EC tablet; TAKE 1 TABLET BY MOUTH ONE TIME DAILY FOR REFLUX  Dispense: 90 tablet; Refill: 3    9. Combined arterial insufficiency and corporo-venous occlusive erectile dysfunction  As needed. No side effects.  - sildenafil (REVATIO) 20 MG tablet; TAKE 1-5 TABS BY MOUTH DAILY AS NEEDED FOR ED. TAKE 1-3 HRS BE4 INTERCOURSE. MAX 1 DOSE/24HRS  Dispense: 30 tablet; Refill: 4    10. Herpetic gingivostomatitis  As needed.  - valACYclovir (VALTREX) 500 MG tablet; TAKE ONE TABLET BY MOUTH EVERY TWELVE HOURS FOR 5 DAYS. START AT EARLIEST ONSET OF SYMPTOMS  Dispense: 20 tablet; Refill: 1    11. Benign prostatic hyperplasia with lower urinary tract symptoms, symptom details unspecified    - tamsulosin (FLOMAX) 0.4 MG capsule; Take 1 capsule (0.4 mg) by mouth daily  Dispense: 90 capsule; Refill: 3  - UROLOGY ADULT REFERRAL    COUNSELING:  Reviewed preventive health counseling, as reflected in patient instructions       Regular  "exercise       Healthy diet/nutrition       Vision screening    Estimated body mass index is 30.4 kg/m  as calculated from the following:    Height as of 1/2/20: 1.803 m (5' 11\").    Weight as of 1/2/20: 98.9 kg (218 lb).         reports that he has never smoked. He has never used smokeless tobacco.      Counseling Resources:  ATP IV Guidelines  Pooled Cohorts Equation Calculator  FRAX Risk Assessment  ICSI Preventive Guidelines  Dietary Guidelines for Americans, 2010  USDA's MyPlate  ASA Prophylaxis  Lung CA Screening    Allan Cardona MD, MD  [facility]  "

## 2020-11-02 ENCOUNTER — OFFICE VISIT (OUTPATIENT)
Dept: UROLOGY | Facility: CLINIC | Age: 65
End: 2020-11-02
Payer: COMMERCIAL

## 2020-11-02 VITALS — OXYGEN SATURATION: 96 % | DIASTOLIC BLOOD PRESSURE: 82 MMHG | HEART RATE: 68 BPM | SYSTOLIC BLOOD PRESSURE: 146 MMHG

## 2020-11-02 DIAGNOSIS — N40.1 BENIGN PROSTATIC HYPERPLASIA WITH LOWER URINARY TRACT SYMPTOMS, SYMPTOM DETAILS UNSPECIFIED: Primary | ICD-10-CM

## 2020-11-02 DIAGNOSIS — R97.20 ELEVATED PROSTATE SPECIFIC ANTIGEN (PSA): ICD-10-CM

## 2020-11-02 DIAGNOSIS — Z23 NEED FOR PROPHYLACTIC VACCINATION AND INOCULATION AGAINST INFLUENZA: ICD-10-CM

## 2020-11-02 DIAGNOSIS — R21 PENILE RASH: ICD-10-CM

## 2020-11-02 PROCEDURE — 90682 RIV4 VACC RECOMBINANT DNA IM: CPT | Performed by: UROLOGY

## 2020-11-02 PROCEDURE — 90471 IMMUNIZATION ADMIN: CPT | Performed by: UROLOGY

## 2020-11-02 PROCEDURE — 99203 OFFICE O/P NEW LOW 30 MIN: CPT | Mod: 25 | Performed by: UROLOGY

## 2020-11-02 RX ORDER — TRIAMCINOLONE ACETONIDE 1 MG/G
OINTMENT TOPICAL 2 TIMES DAILY
Qty: 15 G | Refills: 0 | Status: SHIPPED | OUTPATIENT
Start: 2020-11-02 | End: 2022-10-24

## 2020-11-02 NOTE — PATIENT INSTRUCTIONS
Prostate Ultrasound Instructions  Dr. Silva  [address] [address]  [phone]      Appointment Date: 1/12/2021   Time: Arrive 8:00am for injection    ? Take antibiotics as directed on label. START THE DAY OF THE BIOPSY   ? 1 Fleets enema to be done 1    - 2 hours before procedure   NOTHING BY MOUTH AFTER THE ENEMA   ? If you are taking blood thinners (Aspirin, ibuprofen, Aleve) this is to be stopped ONE WEEK before the procedure. Tylenol is ok. If you are taking Coumadin, you must check with your primary doctor for further instructions  ? Please follow up in the office with the doctor ONE WEEK after the procedure to go over results      Follow up appointment:  Date: 1/19/2021 Time:  Late morning video   PROSTATE BIOPSY  Patient information:  You have had an examination of the prostate gland called a DARLYN (digital rectal examination) where a finger was inserted into your rectum to enable the doctor to feel your prostate gland. You may also have had a PSA (prostate specific antigen) blood test.  Sometimes an elevated PSA level and an abnormal DARLYN can be signs of prostate cancer. The doctor has recommended that you have a prostate biopsy.  What is a prostate biopsy:  You will be positioned for the biopsy as preferred by the physician.  An ultrasound probe is inserted into the rectum and a needle is then passed through the wall of the rectum and an injection of local anesthetic is given. Following the injection of local anesthetic, a needle will be inserted into the prostate gland to take a sample of cells. The doctor will usually take 12 separate biopsies.  You can hear some clicking sounds from the biopsy instrument whilst the biopsies are taken.  The procedure will take approximately five to ten minutes.   What are the risks?  ? Infection: As there is a chance of infection we give you antibiotics before the procedure to reduce the risk. However is you experience a fever, chills, nausea, or just not  feeling up to 72 hours after the biopsy you need to go directly to the emergency room.   ? Antibiotics: The physician will send a prescription to your preferred pharmacy.  We will call you once your culture results come back to let you that you can pick them up. You will follow the directions on the bottle.  Sometimes 2 different antibiotics are sent. Start the antibiotics on THE DAY OF THE BIOPSY.   ? Bleeding: Some men will see blood in the urine, semen and stool.  Bleeding can last for 6-8 weeks intermittently.  If you take any medication such as aspirin, warfarin, clopidogrel then the risk of bleeding will be higher.   ? Retention: Some men are unable to pass urine after the biopsy. This is called urine retention. This is very rare.  If you are unable to urinate please call our clinic or go to urgent care or the emergency room.   Getting the results:  The biopsy samples will be sent to a laboratory for examination by a pathologist.  It can take up to one week, sometimes longer, for your doctor to receive the results.  You will be given an appointment to return to clinic for the results of your biopsy within 1-2 weeks. If you do not receive an appointment for the results of the biopsy at the time of the scheduling, please contact the clinic to arrange an appointment for the results.   WE ARE NOT IN ANY CIRCUMSTANCES PERMITTED TO GIVE RESULTS OVER THE PHONE    What to expect following a biopsy:  You are advised to take it easy for the remainder of the day.  You may eat and drink as normal.  No restrictions to your normal daily routine.  As there is a risk of infection you will be given a course of antibiotics.  PLEASE COMPLETE THE FULL COURSE AS INSTRUCTED   Contact information:  Please call the clinic with any further questions [phone] *Do not leave an urgent message on this voicemail as we may not receive it until the following business day*  Info also mailed to patient home address

## 2020-11-02 NOTE — PROGRESS NOTES
S: Grey Markham is a pleasant  64 year old male who was requested to be seen by  Navin Euceda for a consult with regard to patient's urinary complaints with elevated psa.  Patient complains of Sensation of incomplete emptying, Nocturia x 5, Urgency, Frequency and Intermittency.  He has history of elevated PSA.  Symptoms have been on going for   several years(s).  Seems to be worsened over time.  His recent PSA was found to be   PSA   Date Value Ref Range Status   08/14/2020 14.10 (H) 0 - 4 ug/L Final     Comment:     Assay Method:  Chemiluminescence using Siemens Vista analyzer   .  His AUA Symptom Score:  20.  He has tried flomax in the past but could not tolerate side effects.  He is s/p biopsy of prostate x 2 due to elevated psa with last biopsy in 2016.  He c/o penile rash recently and has tried OTC cream without much improvement.    Current Outpatient Medications   Medication Sig Dispense Refill     aspirin 81 MG tablet Take 1 tablet by mouth daily. * 100 tablet prn     atorvastatin (LIPITOR) 80 MG tablet Take 1 tablet (80 mg) by mouth daily for cholesterol. 90 tablet 3     FLUoxetine 20 MG tablet Take 1 tablet (20 mg) by mouth daily for anxiety prevention. 90 tablet 3     ketoconazole (NIZORAL) 2 % external cream Apply topically 2 times daily as needed (to seborrhea of face) 30 g 1     pantoprazole (PROTONIX) 40 MG EC tablet TAKE 1 TABLET BY MOUTH ONE TIME DAILY FOR REFLUX 90 tablet 3     sildenafil (REVATIO) 20 MG tablet TAKE 1-5 TABS BY MOUTH DAILY AS NEEDED FOR ED. TAKE 1-3 HRS BE4 INTERCOURSE. MAX 1 DOSE/24HRS 30 tablet 4     sulindac (CLINORIL) 150 MG tablet Take 1 tablet by mouth 2 times daily as needed. for wrist pain. Take with food. 180 tablet 0     tamsulosin (FLOMAX) 0.4 MG capsule Take 1 capsule (0.4 mg) by mouth daily 90 capsule 3     triamcinolone (KENALOG) 0.1 % external ointment Apply topically 2 times daily 15 g 0     valACYclovir (VALTREX) 500 MG tablet TAKE ONE TABLET BY MOUTH EVERY  TWELVE HOURS FOR 5 DAYS. START AT EARLIEST ONSET OF SYMPTOMS 20 tablet 1     Allergies   Allergen Reactions     Gemfibrozil Diarrhea     No Clinical Screening - See Comments      All glaucoma drops except travatan     Pilocarpine Hydrochloride      Eye drops     Timoptic [Timolol Maleate]      Eye drops only     Past Medical History:   Diagnosis Date     Coronary atherosclerosis of native coronary artery 12/30/09    10%  mid-LAD lesion, Dr. Mendoza     DDD (degenerative disc disease), lumbar     L-4-L5, L5-S1     GERD (gastroesophageal reflux disease) 11/2007    no Rose's     Glaucoma      Glaucoma      Hyperlipidemia LDL goal <100     hyper-TG, on Simv 80mg since at least 1/4/10     Oral herpes      Past Surgical History:   Procedure Laterality Date     ARTHROSCOPY KNEE RT/LT  2/22/08    RT MMT, Dr. Shaista HADDAD GLAUCOMA SURG,IRIDENCLEISIS  4/03    LASER     C GLAUCOMA SURG,IRIDENCLEISIS  6/06    laser     CARDIAC CATHERIZATION  12/30/09    10% stenosis of mid LAD, EF 70%, nl otherwise Dr. Elliot Mendoza, [facility]     CATARACT IOL, RT/LT Bilateral 5/26/16 (right), June 2016 (left)     COLONOSCOPY WITH CO2 INSUFFLATION N/A 7/9/2018    Procedure: COLONOSCOPY WITH CO2 INSUFFLATION;  Screen for colon cancer;  Surgeon: Navin Vasquez DO;  Location: [facility] OR     COMBINED ESOPHAGOSCOPY, GASTROSCOPY, DUODENOSCOPY (EGD) WITH CO2 INSUFFLATION N/A 6/14/2017    Procedure: COMBINED ESOPHAGOSCOPY, GASTROSCOPY, DUODENOSCOPY (EGD) WITH CO2 INSUFFLATION;  EGD-GASTROESOPHAGEAL REFLUX DISEASE WITHOUT ESOPHAGITIS/ DARNELL;  Surgeon: Navin Vasquez DO;  Location: [facility] OR     ESOPHAGOSCOPY, GASTROSCOPY, DUODENOSCOPY (EGD), COMBINED N/A 6/14/2017    Procedure: COMBINED ESOPHAGOSCOPY, GASTROSCOPY, DUODENOSCOPY (EGD), BIOPSY SINGLE OR MULTIPLE;;  Surgeon: Navin Vasquez DO;  Location: [facility] OR     GLAUCOMA SURGERY      ahmed valve x 1 le x 2 re     GLAUCOMA SURGERY Right 6/16/2015    trabeculectomy with [facility]     HERNIA REPAIR, UMBILICAL   age 6     LASER SURGERY OF EYE      6-7 lasers [facility] glaucoma     LASER SURGERY OF EYE Right 2015    [facility]     PROSTATE BIOPSY, NEEDLE, SATURATION SAMPLING  15    Dr. Zaragoza      Family History   Problem Relation Age of Onset     Cancer Father          lymphoma age 73     Glaucoma Father      Glaucoma Mother 87     Cerebrovascular Disease Mother      Cancer Maternal Grandmother      Cancer Other         aunt liver cancer     Diabetes Other         mother's relatives     C.A.D. No family hx of      Macular Degeneration No family hx of      Hypertension No family hx of      Thyroid Disease No family hx of      Anesthesia Reaction No family hx of      Bleeding Disorder No family hx of      Thrombophilia No family hx of      He does not have a family history of prostate cancer.  Social History     Socioeconomic History     Marital status: Single     Spouse name: None     Number of children: 0     Years of education: 14     Highest education level: None   Occupational History     None   Social Needs     Financial resource strain: None     Food insecurity     Worry: None     Inability: None     Transportation needs     Medical: None     Non-medical: None   Tobacco Use     Smoking status: Never Smoker     Smokeless tobacco: Never Used   Substance and Sexual Activity     Alcohol use: Yes     Comment: weekends     Drug use: No     Sexual activity: Yes     Partners: Female   Lifestyle     Physical activity     Days per week: None     Minutes per session: None     Stress: None   Relationships     Social connections     Talks on phone: None     Gets together: None     Attends Oriental orthodox service: None     Active member of club or organization: None     Attends meetings of clubs or organizations: None     Relationship status: None     Intimate partner violence     Fear of current or ex partner: None     Emotionally abused: None     Physically abused: None     Forced sexual activity: None   Other Topics Concern      Parent/sibling w/ CABG, MI or angioplasty before 65F 55M? Not Asked      Service No     Blood Transfusions No     Caffeine Concern No     Occupational Exposure No     Hobby Hazards No     Sleep Concern No     Stress Concern Yes     Comment: anxiety     Weight Concern Yes     Comment: patient is concerned about weight gain     Special Diet No     Back Care Yes     Exercise Yes     Comment: regularly     Bike Helmet No     Seat Belt Yes     Self-Exams Yes     Comment: occasionally   Social History Narrative    ** Merged History Encounter **             REVIEW OF SYSTEMS  =================  C: NEGATIVE for fever, chills, change in weight  I: NEGATIVE for worrisome rashes, moles or lesions  E/M: NEGATIVE for ear, mouth and throat problems  R: NEGATIVE for significant cough or SHORTNESS OF BREATH  CV:  NEGATIVE for chest pain, palpitations or peripheral edema  GI: NEGATIVE for nausea, abdominal pain, heartburn, or change in bowel habits  NEURO: NEGATIVE numbness/weakness  : see HPI  PSYCH: NEGATIVE depression/anxiety  LYmph: no new enlarged lymph nodes  Ortho: no new trauma/movements           O: Exam:BP (!) 146/82 (BP Location: Left arm, Patient Position: Sitting, Cuff Size: Adult Large)   Pulse 68   SpO2 96%    Constitutional: healthy, alert and no distress  Cardiovascular: negative, PMI normal.   Respiratory: negative, no evidence of respiratory distress  Gastrointestinal: Abdomen soft, non-tender. BS normal. No masses, organomegaly  : penis with penile rash (dermatitis).  Testis no masses.  No scrotal skin lesion.  Prostate 60 gm plus.  Musculoskeletal: extremities normal- no gross deformities noted, gait normal and normal muscle tone  Skin: no suspicious lesions or rashes  Neurologic: Alert and oriented  Psychiatric: mentation appears normal. and affect normal/bright  Hematologic/Lymphatic/Immunologic: normal ant/post cervical, axillary, supraclavicular and inguinal nodes    Assessment/Plan:   (N40.1)  Benign prostatic hyperplasia with lower urinary tract symptoms, symptom details unspecified  (primary encounter diagnosis)  Comment:  ml.  Plan:      (R97.20) Elevated prostate specific antigen (PSA)  Comment:  Biopsy x 2 in the past  Plan: MR Prostate wo & w Contrast          next    (R21) Penile rash  Comment:    Plan: triamcinolone (KENALOG) 0.1 % external ointment             (Z23) Need for prophylactic vaccination and inoculation against influenza  Comment:    Plan: INFLUENZA QUAD, RECOMBINANT, P-FREE (RIV4)         (FLUBLOCK) [45798], VACCINE ADMINISTRATION,         INITIAL

## 2020-11-24 ENCOUNTER — HOSPITAL ENCOUNTER (OUTPATIENT)
Dept: MRI IMAGING | Facility: CLINIC | Age: 65
Discharge: HOME | End: 2020-11-24
Attending: UROLOGY | Admitting: UROLOGY
Payer: COMMERCIAL

## 2020-11-24 DIAGNOSIS — R97.20 ELEVATED PROSTATE SPECIFIC ANTIGEN (PSA): ICD-10-CM

## 2020-11-24 DIAGNOSIS — N40.1 BENIGN PROSTATIC HYPERPLASIA WITH LOWER URINARY TRACT SYMPTOMS, SYMPTOM DETAILS UNSPECIFIED: ICD-10-CM

## 2020-11-24 PROCEDURE — A9585 GADOBUTROL INJECTION: HCPCS | Performed by: UROLOGY

## 2020-11-24 PROCEDURE — 255N000002 HC RX 255 OP 636: Performed by: UROLOGY

## 2020-11-24 PROCEDURE — 72197 MRI PELVIS W/O & W/DYE: CPT | Mod: 26 | Performed by: RADIOLOGY

## 2020-11-24 PROCEDURE — 72197 MRI PELVIS W/O & W/DYE: CPT

## 2020-11-24 RX ORDER — GADOBUTROL 604.72 MG/ML
9.5 INJECTION INTRAVENOUS ONCE
Status: COMPLETED | OUTPATIENT
Start: 2020-11-24 | End: 2020-11-24

## 2020-11-24 RX ADMIN — GADOBUTROL 9.5 ML: 604.72 INJECTION INTRAVENOUS at 11:42

## 2020-11-30 DIAGNOSIS — M25.531 RIGHT WRIST PAIN: ICD-10-CM

## 2020-11-30 DIAGNOSIS — N52.03 COMBINED ARTERIAL INSUFFICIENCY AND CORPORO-VENOUS OCCLUSIVE ERECTILE DYSFUNCTION: ICD-10-CM

## 2020-12-01 RX ORDER — SILDENAFIL CITRATE 20 MG/1
TABLET ORAL
Qty: 30 TABLET | Refills: 4 | Status: SHIPPED | OUTPATIENT
Start: 2020-12-01 | End: 2021-04-01

## 2020-12-01 RX ORDER — SULINDAC 150 MG/1
150 TABLET ORAL 2 TIMES DAILY PRN
Qty: 180 TABLET | Refills: 0 | Status: SHIPPED | OUTPATIENT
Start: 2020-12-01 | End: 2021-03-03

## 2020-12-01 NOTE — TELEPHONE ENCOUNTER
"Requested Prescriptions   Pending Prescriptions Disp Refills    sildenafil (REVATIO) 20 MG tablet [Pharmacy Med Name: SILDENAFIL 20 MG TABLET] 30 tablet 4     Sig: TAKE 1-5 TABS BY MOUTH DAILY AS NEEDED FOR ED. TAKE 1-3 HRS BE4 INTERCOURSE. MAX 1 DOSE/24HRS       Erectile Dysfuction Protocol Failed - 11/30/2020  7:07 PM        Failed - Absence of Alpha Blockers on Med list        Passed - Absence of nitrates on medication list        Passed - Recent (12 mo) or future (30 days) visit within the authorizing provider's specialty     Patient has had an office visit with the authorizing provider or a provider within the authorizing providers department within the previous 12 mos or has a future within next 30 days. See \"Patient Info\" tab in inbasket, or \"Choose Columns\" in Meds & Orders section of the refill encounter.              Passed - Medication is active on med list        Passed - Patient is age 18 or older          sulindac (CLINORIL) 150 MG tablet 180 tablet 0     Sig: Take 1 tablet (150 mg) by mouth 2 times daily as needed for wrist pain. Take with food.       NSAID Medications Failed - 11/30/2020  7:07 PM        Failed - Blood pressure under 140/90 in past 12 months     BP Readings from Last 3 Encounters:   11/02/20 (!) 146/82   08/14/20 135/88   01/02/20 132/83                 Failed - Normal CBC on file in past 12 months     Recent Labs   Lab Test 01/28/17  1150   WBC 6.5   RBC 4.82   HGB 14.3   HCT 42.8                    Passed - Normal ALT on file in past 12 months     Recent Labs   Lab Test 08/14/20  0953   ALT 53             Passed - Normal AST on file in past 12 months     Recent Labs   Lab Test 08/14/20  0953   AST 27             Passed - Recent (12 mo) or future (30 days) visit within the authorizing provider's specialty     Patient has had an office visit with the authorizing provider or a provider within the authorizing providers department within the previous 12 mos or has a future within " "next 30 days. See \"Patient Info\" tab in inbasket, or \"Choose Columns\" in Meds & Orders section of the refill encounter.              Passed - Patient is age 6-64 years        Passed - Medication is active on med list        Passed - Normal serum creatinine on file in past 12 months     Recent Labs   Lab Test 08/14/20  0953   CR 0.99       Ok to refill medication if creatinine is low               "

## 2020-12-02 ENCOUNTER — MYC MEDICAL ADVICE (OUTPATIENT)
Dept: UROLOGY | Facility: CLINIC | Age: 65
End: 2020-12-02

## 2020-12-02 NOTE — TELEPHONE ENCOUNTER
Left message for patient to call clinic.  Per Dr. Silva schedule for prostate biopsy.   Diamond Aguilar, RN     [phone]

## 2020-12-03 ENCOUNTER — MYC MEDICAL ADVICE (OUTPATIENT)
Dept: UROLOGY | Facility: CLINIC | Age: 65
End: 2020-12-03

## 2020-12-03 DIAGNOSIS — R97.20 ELEVATED PROSTATE SPECIFIC ANTIGEN (PSA): Primary | ICD-10-CM

## 2020-12-04 ENCOUNTER — MYC MEDICAL ADVICE (OUTPATIENT)
Dept: FAMILY MEDICINE | Facility: CLINIC | Age: 65
End: 2020-12-04

## 2020-12-04 RX ORDER — CIPROFLOXACIN 500 MG/1
500 TABLET, FILM COATED ORAL 2 TIMES DAILY
Qty: 6 TABLET | Refills: 0 | Status: SHIPPED | OUTPATIENT
Start: 2020-12-04 | End: 2021-08-27

## 2020-12-04 NOTE — TELEPHONE ENCOUNTER
Patient scheduled for TRUSP biopsy on 1/12/2021. Verbal instructions given in detail. Info mailed to patient home address and entered into My Chart. Will route to MD to send antibiotics.

## 2020-12-04 NOTE — TELEPHONE ENCOUNTER
Left message for patient to call [phone] to schedule prostate biopsy. Per Dr. Silva, nothing significant found on MRI but still need to do prostate biopsy to rule out cancer.   Christine Verde, CMA

## 2020-12-04 NOTE — TELEPHONE ENCOUNTER
Cataract DOES NOT appear visually significant. Team, please reach out to patient to schedule follow up apt and labs. If patient needs hanny refill to get to scheduled apt, please route to refill pool.'    Yesscia Priest RN  [facility]

## 2021-01-12 ENCOUNTER — OFFICE VISIT (OUTPATIENT)
Dept: UROLOGY | Facility: CLINIC | Age: 66
End: 2021-01-12
Payer: MEDICAID

## 2021-01-12 ENCOUNTER — ANCILLARY PROCEDURE (OUTPATIENT)
Dept: ULTRASOUND IMAGING | Facility: CLINIC | Age: 66
End: 2021-01-12
Payer: MEDICAID

## 2021-01-12 DIAGNOSIS — R97.20 ELEVATED PROSTATE SPECIFIC ANTIGEN (PSA): Primary | ICD-10-CM

## 2021-01-12 DIAGNOSIS — R97.20 ELEVATED PROSTATE SPECIFIC ANTIGEN (PSA): ICD-10-CM

## 2021-01-12 PROCEDURE — 76872 US TRANSRECTAL: CPT | Performed by: UROLOGY

## 2021-01-12 PROCEDURE — 88305 TISSUE EXAM BY PATHOLOGIST: CPT | Performed by: PATHOLOGY

## 2021-01-12 PROCEDURE — 55700 PR BIOPSY OF PROSTATE,NEEDLE/PUNCH: CPT | Performed by: UROLOGY

## 2021-01-12 PROCEDURE — 96372 THER/PROPH/DIAG INJ SC/IM: CPT | Mod: 59 | Performed by: UROLOGY

## 2021-01-12 RX ORDER — GENTAMICIN 40 MG/ML
80 INJECTION, SOLUTION INTRAMUSCULAR; INTRAVENOUS ONCE
Status: COMPLETED | OUTPATIENT
Start: 2021-01-12 | End: 2021-01-12

## 2021-01-12 RX ADMIN — GENTAMICIN 80 MG: 40 INJECTION, SOLUTION INTRAMUSCULAR; INTRAVENOUS at 08:33

## 2021-01-12 NOTE — PROGRESS NOTES
Patient is here for prostate biopsy.  He was placed in the dorsal lithotomy position.  Prostate ultrasound placed transrectally.  The neurovascular bundle was anesthetized using 1% lidocaine.  Prostate measurements obtained.  Prostate size is 120 ml.  12 biopsies obtained under guidance of prostate ultrasound using biopsy needle.  Patient tolerated procedure.  Return to clinic in one week for biopsy result.

## 2021-01-12 NOTE — PROGRESS NOTES
Clinic Administered Medication Documentation        Injectable Medication Documentation     Patient was given Gentamicin. Prior to medication administration, verified patients identity using patient s name and date of birth. Please see MAR and medication order for additional information. Patient instructed to remain in clinic for 15 minutes and report any adverse reaction to staff immediately .        Was entire vial of medication used? Yes  Vial/Syringe: Single dose vial  Expiration Date:  11/21  Was this medication supplied by the patient? No     The following medication was given:      MEDICATION: Gentamicin   ROUTE: IM  SITE: RUQ - Gluteus  DOSE: 80 mg  LOT #: [id number]  :  Novaliq   EXPIRATION DATE:  11/21  NDC#: 68236-860-32     Diamond Aguilar RN

## 2021-01-14 LAB — COPATH REPORT: NORMAL

## 2021-01-19 ENCOUNTER — VIRTUAL VISIT (OUTPATIENT)
Dept: UROLOGY | Facility: CLINIC | Age: 66
End: 2021-01-19
Payer: COMMERCIAL

## 2021-01-19 DIAGNOSIS — N40.1 BENIGN PROSTATIC HYPERPLASIA WITH LOWER URINARY TRACT SYMPTOMS, SYMPTOM DETAILS UNSPECIFIED: ICD-10-CM

## 2021-01-19 DIAGNOSIS — R97.20 ELEVATED PROSTATE SPECIFIC ANTIGEN (PSA): Primary | ICD-10-CM

## 2021-01-19 PROCEDURE — 99213 OFFICE O/P EST LOW 20 MIN: CPT | Mod: 95 | Performed by: UROLOGY

## 2021-01-19 RX ORDER — FINASTERIDE 5 MG/1
5 TABLET, FILM COATED ORAL DAILY
Qty: 90 TABLET | Refills: 3 | Status: SHIPPED | OUTPATIENT
Start: 2021-01-19 | End: 2021-12-30

## 2021-01-19 RX ORDER — DOXAZOSIN 1 MG/1
TABLET ORAL
Qty: 77 TABLET | Refills: 0 | Status: SHIPPED | OUTPATIENT
Start: 2021-01-19 | End: 2021-05-07

## 2021-01-19 NOTE — PROGRESS NOTES
Grey is a 65 year old who is being evaluated via a billable telephone visit.      What phone number would you like to be contacted at? [phone]  How would you like to obtain your AVS? Gavi       Bassam Edmonds is a 65 year old who presents to clinic today for the following health issues biopsy results    HPI       Patient had recent prostate biopsy due to elevated PSA.  He is without any complaints.  He has enlarged prostate with severe LUTS.  He has tried Flomax in the past but could not tolerate the side effects.  He has nocturia x5 and daytime frequency every hour.    Review of Systems   Constitutional, HEENT, cardiovascular, pulmonary, gi and gu systems are negative, except as otherwise noted.      Objective           Vitals:  No vitals were obtained today due to virtual visit.    Physical Exam   healthy, alert and no distress  PSYCH: Alert and oriented times 3; coherent speech, normal   rate and volume, able to articulate logical thoughts, able   to abstract reason, no tangential thoughts, no hallucinations   or delusions  His affect is normal  RESP: No cough, no audible wheezing, able to talk in full sentences  Remainder of exam unable to be completed due to telephone visits         Copath Report Patient Name: GREY FRANKLIN   MR#: [id number]   Specimen #:    Collected: 1/12/2021   Received: 1/13/2021   Reported: 1/14/2021 12:05   Ordering Phy(s): PAULINO BEAUCHAMP     For improved result formatting, select 'View Enhanced Report Format' under    Linked Documents section.     SPECIMEN(S):   A: Prostate needle biopsy, left   B: Prostate needle biopsy, right     FINAL DIAGNOSIS:   A: Prostate, left, biopsy   - Benign prostate tissue with atrophic change     B: Prostate, right, biopsy   - Benign prostate tissue with focal atrophic change and mild acute and   chronic prostatitis     Electronically signed out by:     Grey Cedeño M.D.     CLINICAL HISTORY:   Elevated PSA             Elevated psa:  No cancer found.  Recheck psa in six months    BPH:  Titration of cardura.  Start proscar.  Recheck in one month.    Phone call duration: 15 minutes

## 2021-01-21 ENCOUNTER — TELEPHONE (OUTPATIENT)
Dept: UROLOGY | Facility: CLINIC | Age: 66
End: 2021-01-21

## 2021-01-21 NOTE — TELEPHONE ENCOUNTER
Prior Authorization Retail Medication Request    Medication/Dose:   doxazosin (CARDURA) 1 MG tablet 77 tablet 0 1/19/2021 2/16/2021 --   Sig - Route: Take 1 tablet (1 mg) by mouth daily for 7 days, THEN 2 tablets (2 mg) daily for 7 days, THEN 4 tablets (4 mg) daily for 7 days, THEN 4 tablets (4 mg) daily for 7 days. - Oral   Sent to pharmacy as: Doxazosin Mesylate 1 MG Oral Tablet (CARDURA)       ICD code (if different than what is on RX):  JEWELS  Previously Tried and Failed:  Tamsulosin   Rationale:  Side effects from Tamsulsin    Insurance Name:  [facility]  Insurance ID: [id number]      Pharmacy Information (if different than what is on RX)  Name:  [facility]  Phone:  JEWELS Aguilar RN

## 2021-01-22 NOTE — TELEPHONE ENCOUNTER
Central Prior Authorization Team   Phone: [phone]    PA Initiation    Medication:   Insurance [facility] - Phone [phone] Fax [phone]  Pharmacy Filling the Rx: [id number] [place] - [address]  Filling Pharmacy Phone: [phone]  Filling Pharmacy Fax: [phone]  Start Date: 1/22/2021

## 2021-01-25 NOTE — TELEPHONE ENCOUNTER
Prior Authorization Approval    Authorization Effective Date: 1/20/2021  Authorization Expiration Date: 1/20/2022  Medication: DOXAZOSIN-APPROVED  Approved Dose/Quantity:    Reference #:     Insurance Company: [employer] [place] - Phone [phone] Fax [phone]  Expected CoPay:       CoPay Card Available:      Foundation Assistance Needed:    Which Pharmacy is filling the prescription (Not needed for infusion/clinic administered): [facility] [address] [facility] - [place] , [place] - [address]  Pharmacy Notified: Yes  Patient Notified: Yes  **Instructed pharmacy to notify patient when script is ready to /ship.**

## 2021-02-15 DIAGNOSIS — R97.20 ELEVATED PROSTATE SPECIFIC ANTIGEN (PSA): Primary | ICD-10-CM

## 2021-02-15 RX ORDER — DOXAZOSIN 4 MG/1
4 TABLET ORAL AT BEDTIME
Qty: 90 TABLET | Refills: 1 | Status: SHIPPED | OUTPATIENT
Start: 2021-02-15 | End: 2021-05-07

## 2021-02-15 NOTE — TELEPHONE ENCOUNTER
Signed Prescriptions:                        Disp   Refills    doxazosin (CARDURA) 4 MG tablet            90 tab*1        Sig: Take 1 tablet (4 mg) by mouth At Bedtime  Authorizing Provider: PAULINO BEAUCHAMP  Ordering User: SHRUTHI ESPINOSA RN

## 2021-02-24 DIAGNOSIS — M25.531 RIGHT WRIST PAIN: ICD-10-CM

## 2021-03-03 RX ORDER — SULINDAC 150 MG/1
150 TABLET ORAL 2 TIMES DAILY PRN
Qty: 180 TABLET | Refills: 0 | Status: SHIPPED | OUTPATIENT
Start: 2021-03-03 | End: 2021-09-05

## 2021-03-31 DIAGNOSIS — N52.03 COMBINED ARTERIAL INSUFFICIENCY AND CORPORO-VENOUS OCCLUSIVE ERECTILE DYSFUNCTION: ICD-10-CM

## 2021-04-01 RX ORDER — SILDENAFIL CITRATE 20 MG/1
TABLET ORAL
Qty: 30 TABLET | Refills: 4 | Status: SHIPPED | OUTPATIENT
Start: 2021-04-01 | End: 2021-10-13

## 2021-04-01 NOTE — TELEPHONE ENCOUNTER
"Routing refill request to provider for review/approval because:  Requested Prescriptions   Pending Prescriptions Disp Refills    sildenafil (REVATIO) 20 MG tablet [Pharmacy Med Name: SILDENAFIL 20 MG TABLET] 30 tablet 4     Sig: TAKE 1-5 TABS BY MOUTH DAILY AS NEEDED FOR ED. TAKE 1-3 HRS BE4 INTERCOURSE. MAX 1 DOSE/24HRS       Erectile Dysfuction Protocol Failed - 3/31/2021 11:18 AM        Failed - Absence of Alpha Blockers on Med list        Passed - Absence of nitrates on medication list        Passed - Recent (12 mo) or future (30 days) visit within the authorizing provider's specialty     Patient has had an office visit with the authorizing provider or a provider within the authorizing providers department within the previous 12 mos or has a future within next 30 days. See \"Patient Info\" tab in inbasket, or \"Choose Columns\" in Meds & Orders section of the refill encounter.              Passed - Medication is active on med list        Passed - Patient is age 18 or older               Pao STUBBS, RN          "

## 2021-05-05 ENCOUNTER — TELEPHONE (OUTPATIENT)
Dept: FAMILY MEDICINE | Facility: CLINIC | Age: 66
End: 2021-05-05

## 2021-05-05 DIAGNOSIS — B00.2 HERPETIC GINGIVOSTOMATITIS: ICD-10-CM

## 2021-05-05 NOTE — TELEPHONE ENCOUNTER
"PA has been started for   FLUoxetine 20 MG tablet 90 tablet 3 2020     To submit PA, please follow instructions below     1.login to [email or web address] and click \"Enter KEY\"     2. Enter the patient's last name,date of birth, and the key provided below    KEY: [id number]  Last name: Shahrzad  :1955    3. Complete the PA and click \"Send to Plan: for approval    Please notify pharmacy when you receive a determination.,  "

## 2021-05-06 RX ORDER — VALACYCLOVIR HYDROCHLORIDE 500 MG/1
TABLET, FILM COATED ORAL
Qty: 20 TABLET | Refills: 1 | Status: SHIPPED | OUTPATIENT
Start: 2021-05-06 | End: 2021-08-26

## 2021-05-07 ENCOUNTER — OFFICE VISIT (OUTPATIENT)
Dept: UROLOGY | Facility: CLINIC | Age: 66
End: 2021-05-07
Payer: COMMERCIAL

## 2021-05-07 VITALS
DIASTOLIC BLOOD PRESSURE: 72 MMHG | TEMPERATURE: 98.2 F | HEART RATE: 72 BPM | OXYGEN SATURATION: 96 % | SYSTOLIC BLOOD PRESSURE: 113 MMHG

## 2021-05-07 DIAGNOSIS — N40.1 BENIGN PROSTATIC HYPERPLASIA WITH LOWER URINARY TRACT SYMPTOMS, SYMPTOM DETAILS UNSPECIFIED: Primary | ICD-10-CM

## 2021-05-07 DIAGNOSIS — N39.44 NOCTURNAL ENURESIS: ICD-10-CM

## 2021-05-07 DIAGNOSIS — R97.20 ELEVATED PROSTATE SPECIFIC ANTIGEN (PSA): ICD-10-CM

## 2021-05-07 LAB
ALBUMIN UR-MCNC: NEGATIVE MG/DL
APPEARANCE UR: CLEAR
BILIRUB UR QL STRIP: NEGATIVE
COLOR UR AUTO: YELLOW
GLUCOSE UR STRIP-MCNC: NEGATIVE MG/DL
HGB UR QL STRIP: NEGATIVE
KETONES UR STRIP-MCNC: NEGATIVE MG/DL
LEUKOCYTE ESTERASE UR QL STRIP: NEGATIVE
NITRATE UR QL: NEGATIVE
PH UR STRIP: 5.5 PH (ref 5–7)
SOURCE: NORMAL
SP GR UR STRIP: 1.02 (ref 1–1.03)
UROBILINOGEN UR STRIP-ACNC: 0.2 EU/DL (ref 0.2–1)

## 2021-05-07 PROCEDURE — 99214 OFFICE O/P EST MOD 30 MIN: CPT | Mod: 25 | Performed by: UROLOGY

## 2021-05-07 PROCEDURE — 81003 URINALYSIS AUTO W/O SCOPE: CPT | Performed by: UROLOGY

## 2021-05-07 PROCEDURE — 51798 US URINE CAPACITY MEASURE: CPT | Performed by: UROLOGY

## 2021-05-07 RX ORDER — ALFUZOSIN HYDROCHLORIDE 10 MG/1
10 TABLET, EXTENDED RELEASE ORAL DAILY
Qty: 90 TABLET | Refills: 3 | Status: SHIPPED | OUTPATIENT
Start: 2021-05-07 | End: 2022-04-20

## 2021-05-07 NOTE — PROGRESS NOTES
Chief Complaint   Patient presents with     RECHECK     med side effects       Grey Markham is a 65 year old male who presents today for follow up of   Chief Complaint   Patient presents with     RECHECK     med side effects    patient with h/o benign prostatic hyperplasia/elevated psa.  Prostate size was 120 ml.  He c/o acid reflux/retrograde ejaculation since starting on dozasozin.  His AUA score is 9.  Lately, he notices enuresis also.    Current Outpatient Medications   Medication Sig Dispense Refill     alfuzosin ER (UROXATRAL) 10 MG 24 hr tablet Take 1 tablet (10 mg) by mouth daily 90 tablet 3     aspirin 81 MG tablet Take 1 tablet by mouth daily. * 100 tablet prn     atorvastatin (LIPITOR) 80 MG tablet Take 1 tablet (80 mg) by mouth daily for cholesterol. 90 tablet 3     ciprofloxacin (CIPRO) 500 MG tablet Take 1 tablet (500 mg) by mouth 2 times daily 6 tablet 0     finasteride (PROSCAR) 5 MG tablet Take 1 tablet (5 mg) by mouth daily 90 tablet 3     FLUoxetine 20 MG tablet Take 1 tablet (20 mg) by mouth daily for anxiety prevention. 90 tablet 3     ketoconazole (NIZORAL) 2 % external cream Apply topically 2 times daily as needed (to seborrhea of face) 30 g 1     pantoprazole (PROTONIX) 40 MG EC tablet TAKE 1 TABLET BY MOUTH ONE TIME DAILY FOR REFLUX 90 tablet 3     sildenafil (REVATIO) 20 MG tablet TAKE 1-5 TABS BY MOUTH DAILY AS NEEDED FOR ED. TAKE 1-3 HRS BE4 INTERCOURSE. MAX 1 DOSE/24HRS 30 tablet 4     sulindac (CLINORIL) 150 MG tablet TAKE 1 TABLET (150 MG) BY MOUTH 2 TIMES DAILY AS NEEDED FOR WRIST PAIN. TAKE WITH FOOD. 180 tablet 0     triamcinolone (KENALOG) 0.1 % external ointment Apply topically 2 times daily 15 g 0     valACYclovir (VALTREX) 500 MG tablet TAKE ONE TABLET BY MOUTH EVERY TWELVE HOURS FOR 5 DAYS. START AT EARLIEST ONSET OF SYMPTOMS 20 tablet 1     Allergies   Allergen Reactions     Gemfibrozil Diarrhea     No Clinical Screening - See Comments      All glaucoma drops except travatan      Pilocarpine Hydrochloride      Eye drops     Timoptic [Timolol Maleate]      Eye drops only      Past Medical History:   Diagnosis Date     Coronary atherosclerosis of native coronary artery 12/30/09    10%  mid-LAD lesion, Dr. Mendoza     DDD (degenerative disc disease), lumbar     L-4-L5, L5-S1     GERD (gastroesophageal reflux disease) 11/2007    no Rose's     Glaucoma      Glaucoma      Hyperlipidemia LDL goal <100     hyper-TG, on Simv 80mg since at least 1/4/10     Oral herpes      Past Surgical History:   Procedure Laterality Date     ARTHROSCOPY KNEE RT/LT  2/22/08    RT MMT, Dr. Noonan     C GLAUCOMA SURG,IRIDENCLEISIS  4/03    LASER     C GLAUCOMA SURG,IRIDENCLEISIS  6/06    laser     CARDIAC CATHERIZATION  12/30/09    10% stenosis of mid LAD, EF 70%, nl otherwise Dr. Elliot Mendoza, [facility]     CATARACT IOL, RT/LT Bilateral 5/26/16 (right), June 2016 (left)     COLONOSCOPY WITH CO2 INSUFFLATION N/A 7/9/2018    Procedure: COLONOSCOPY WITH CO2 INSUFFLATION;  Screen for colon cancer;  Surgeon: Navin Vasquez DO;  Location: [facility]     COMBINED ESOPHAGOSCOPY, GASTROSCOPY, DUODENOSCOPY (EGD) WITH CO2 INSUFFLATION N/A 6/14/2017    Procedure: COMBINED ESOPHAGOSCOPY, GASTROSCOPY, DUODENOSCOPY (EGD) WITH CO2 INSUFFLATION;  EGD-GASTROESOPHAGEAL REFLUX DISEASE WITHOUT ESOPHAGITIS/ DARNELL;  Surgeon: Navin Vasquez DO;  Location: [facility]     ESOPHAGOSCOPY, GASTROSCOPY, DUODENOSCOPY (EGD), COMBINED N/A 6/14/2017    Procedure: COMBINED ESOPHAGOSCOPY, GASTROSCOPY, DUODENOSCOPY (EGD), BIOPSY SINGLE OR MULTIPLE;;  Surgeon: Navin Vasquez DO;  Location: [facility]     GLAUCOMA SURGERY      ahmed valve x 1 le x 2 re     GLAUCOMA SURGERY Right 6/16/2015    trabeculectomy with [facility]     HERNIA REPAIR, UMBILICAL  age 6     LASER SURGERY OF EYE      6-7 lasers [facility] glaucoma     LASER SURGERY OF EYE Right 05/05/2015    [facility]     PROSTATE BIOPSY, NEEDLE, SATURATION SAMPLING  12/04/14    Dr. Zaragoza     Family History   Problem  Relation Age of Onset     Cancer Father          lymphoma age 73     Glaucoma Father      Glaucoma Mother 87     Cerebrovascular Disease Mother      Cancer Maternal Grandmother      Cancer Other         aunt liver cancer     Diabetes Other         mother's relatives     C.A.D. No family hx of      Macular Degeneration No family hx of      Hypertension No family hx of      Thyroid Disease No family hx of      Anesthesia Reaction No family hx of      Bleeding Disorder No family hx of      Thrombophilia No family hx of      Social History     Socioeconomic History     Marital status: Single     Spouse name: None     Number of children: 0     Years of education: 14     Highest education level: None   Occupational History     None   Social Needs     Financial resource strain: None     Food insecurity     Worry: None     Inability: None     Transportation needs     Medical: None     Non-medical: None   Tobacco Use     Smoking status: Never Smoker     Smokeless tobacco: Never Used   Substance and Sexual Activity     Alcohol use: Yes     Comment: weekends     Drug use: No     Sexual activity: Yes     Partners: Female   Lifestyle     Physical activity     Days per week: None     Minutes per session: None     Stress: None   Relationships     Social connections     Talks on phone: None     Gets together: None     Attends Ep[employer]opalian service: None     Active member of club or organization: None     Attends meetings of clubs or organizations: None     Relationship status: None     Intimate partner violence     Fear of current or ex partner: None     Emotionally abused: None     Physically abused: None     Forced sexual activity: None   Other Topics Concern     Parent/sibling w/ CABG, MI or angioplasty before 65F 55M? Not Asked      Service No     Blood Transfusions No     Caffeine Concern No     Occupational Exposure No     Hobby Hazards No     Sleep Concern No     Stress Concern Yes     Comment: anxiety     Weight  Concern Yes     Comment: patient is concerned about weight gain     Special Diet No     Back Care Yes     Exercise Yes     Comment: regularly     Bike Helmet No     Seat Belt Yes     Self-Exams Yes     Comment: occasionally   Social History Narrative    ** Merged History Encounter **            REVIEW OF SYSTEMS  =================  C: NEGATIVE for fever, chills, change in weight  I: NEGATIVE for worrisome rashes, moles or lesions  E/M: NEGATIVE for ear, mouth and throat problems  R: NEGATIVE for significant cough or SHORTNESS OF BREATH  CV:  NEGATIVE for chest pain, palpitations or peripheral edema  GI: NEGATIVE for nausea, abdominal pain, heartburn, or change in bowel habits  NEURO: NEGATIVE numbness/weakness  : see HPI  PSYCH: NEGATIVE depression/anxiety  LYmph: no new enlarged lymph nodes  Ortho: no new trauma/movements    Physical Exam:  /72 (BP Location: Right arm, Patient Position: Chair, Cuff Size: Adult Large)   Pulse 72   Temp 98.2  F (36.8  C)   SpO2 96%    Patient is pleasant, in no acute distress, good general condition.  Lung: no evidence of respiratory distress    Abdomen: Soft, nondistended, non tender. No masses. No rebound or guarding.   Exam: bladder scan < 100 ml.  Normal male  Skin: Warm and dry.  No redness.  Psych: normal mood and affect  Neuro: alert and oriented  Musculaskeletal: moving all extremities    Assessment/Plan:   (N40.1) Benign prostatic hyperplasia with lower urinary tract symptoms, symptom details unspecified  (primary encounter diagnosis)  Comment: UA neg.  Retrograde ejaculation/reflux from alpha blocker.  Plan: discontinue doxazosin           Trial of alfuzosin    (R97.20) Elevated prostate specific antigen (PSA)  Comment:    Plan: per previous plan    (N39.44) Nocturnal enuresis  Comment:    Plan: most likely due to sleep apnea           Patient snores           Some weight gain           Sleep study referral next.

## 2021-05-10 NOTE — TELEPHONE ENCOUNTER
Central Prior Authorization Team   Phone: [phone]    PA Initiation    Medication: FLUoxetine 20 MG [id number]/14/2020  Insurance Company:    Pharmacy Filling the Rx: [id number] [place] - [address]  Filling Pharmacy Phone: [phone]  Filling Pharmacy Fax: [phone]  Start Date: 5/10/2021

## 2021-05-11 NOTE — TELEPHONE ENCOUNTER
Looks like currently patient is taking the tablets. I am okay with either tablet of capsule form. Please check with insurance to see if they will cover the tablet or capsule? If not covering both, please start PA for patient.    Allan Cardona MD

## 2021-05-12 NOTE — TELEPHONE ENCOUNTER
Prior Authorization Approval    Authorization Effective Date: 3/1/2021  Authorization Expiration Date: 5/11/2022  Medication: FLUoxetine 20 MG-APPROVED  Approved Dose/Quantity:    Reference #:     Insurance Company:    Expected CoPay:       CoPay Card Available:      Foundation Assistance Needed:    Which Pharmacy is filling the prescription (Not needed for infusion/clinic administered): [id number] [place] [facility] [place] - [address]  Pharmacy Notified: Yes  Patient Notified: Yes  **Instructed pharmacy to notify patient when script is ready to /ship.**

## 2021-06-07 ENCOUNTER — MYC MEDICAL ADVICE (OUTPATIENT)
Dept: FAMILY MEDICINE | Facility: CLINIC | Age: 66
End: 2021-06-07

## 2021-06-07 DIAGNOSIS — L21.9 SEBORRHEA: ICD-10-CM

## 2021-06-08 NOTE — TELEPHONE ENCOUNTER
This medication has not been refilled since 2019.  Routing to provider to advise.  Kelsy Charles BSN, RN

## 2021-06-08 NOTE — PROGRESS NOTES
"Does patient have any form of state insurance? N   Do you have wifi? Y  Do you have a smart phone? N  Can you download an valeriano on your phone comfortably with out assistance? N  Can you watch a Youtube video? N    Grey Markham is a 65 year old male being evaluated via a billable telephone visit.     \"This telephone visit will be conducted via a call between you and your physician/provider. We have found that certain health care needs can be provided without the need for an in-person visit or physical exam.  This service lets us provide the care you need with a telephone conversation.  If a prescription is necessary we can send it directly to your pharmacy.  If lab work is needed we can place an order for that and you can then stop by our lab to have the test done at a later time.\"    Telephone visits are billed at different rates depending on your insurance coverage.  Please reach out to your insurance provider with any questions.    Patient has given verbal consent for  a Telephone visit? Yes    What telephone number would you like your provider to contact at at:  [phone]    How would you like to obtain your AVS? Gavi Santiago    Telephone Visit Details:     Telephone Visit Start Time: 2:59 PM    Telephone Visit End Time:3:12 PM    Thank you for the opportunity to participate in the care of  Grey Markham.    Assessment and Plan:    In summary Grey Markham is a 65 year old year old male here for sleep disturbance.  1. Hypersomnia/Snoring/Sleep related bruxism/Nocturnal enuresis   Grey Markham has high risk for obstructive sleep apnea based on the history of hypersomnia, snoring, sleep related bruxism and nocturnal enuresis. I educated the patient on the underlying pathophysiology of obstructive sleep apnea. We reviewed the risks associated with sleep apnea, including increased cardiovascular risk and overall death. We talked about treatments briefly. I recommend getting an baseline nocturnal " polysomnography. The patient should return to the clinic to discuss results and treatment option in a patient-centered approach.    History of present illness:    He is a 65 year old male who comes to the virtual clinic with a chief complaint of excessive daytime sleepiness that has been going on for approximately 5 years. While the patient denies any episodes of witness apnea, he has been told that he does have loud snoring during sleep. He does wear a mouth guard to help sleep related teeth grinding. He also admits to waking up  Multiple times in the middle of the night to urinate.     Ideal Sleep-Wake Cycle(devoid of societal pressure):    Patient would try to initiate sleep at around 10-10:30 PM with a sleep latency of 30 minutes. The patient would have 3 awakenings. Final wake up time is around 8 AM.    Total score - Brady: 4 (5/26/2021  4:00 PM)    Patient told to return in one week after the sleep study is interpreted.    Patient Active Problem List   Diagnosis     Gastroesophageal reflux disease without esophagitis     Atherosclerosis of native coronary artery of native heart without angina pectoris     Hyperlipidemia LDL goal <100     Oral herpes     Anxiety     Advance care planning     Glaucoma     Obesity, Class I, BMI 30-34.9     Elevated prostate specific antigen (PSA)     Dysuria     Photophobia of both eyes     Combined arterial insufficiency and corporo-venous occlusive erectile dysfunction     Benign prostatic hyperplasia with lower urinary tract symptoms, symptom details unspecified     Past Medical History:   Diagnosis Date     Coronary atherosclerosis of native coronary artery 12/30/09    10%  mid-LAD lesion, Dr. Mendoza     DDD (degenerative disc disease), lumbar     L-4-L5, L5-S1     GERD (gastroesophageal reflux disease) 11/2007    no Rose's     Glaucoma      Glaucoma      Hyperlipidemia LDL goal <100     hyper-TG, on Simv 80mg since at least 1/4/10     Oral herpes      Past Surgical  History:   Procedure Laterality Date     ARTHROSCOPY KNEE RT/LT  2/22/08    RT MMT, Dr. Shaista HADDAD GLAUCOMA SURG,IRIDENCLEISIS  4/03    LASER     C GLAUCOMA SURG,IRIDENCLEISIS  6/06    laser     CARDIAC CATHERIZATION  12/30/09    10% stenosis of mid LAD, EF 70%, nl otherwise Dr. Elliot Mendoza, [facility]     CATARACT IOL, RT/LT Bilateral 5/26/16 (right), June 2016 (left)     COLONOSCOPY WITH CO2 INSUFFLATION N/A 7/9/2018    Procedure: COLONOSCOPY WITH CO2 INSUFFLATION;  Screen for colon cancer;  Surgeon: Navin Vasquez DO;  Location: [facility]     COMBINED ESOPHAGOSCOPY, GASTROSCOPY, DUODENOSCOPY (EGD) WITH CO2 INSUFFLATION N/A 6/14/2017    Procedure: COMBINED ESOPHAGOSCOPY, GASTROSCOPY, DUODENOSCOPY (EGD) WITH CO2 INSUFFLATION;  EGD-GASTROESOPHAGEAL REFLUX DISEASE WITHOUT ESOPHAGITIS/ DARNELL;  Surgeon: Navin Vasquez DO;  Location: [facility]     ESOPHAGOSCOPY, GASTROSCOPY, DUODENOSCOPY (EGD), COMBINED N/A 6/14/2017    Procedure: COMBINED ESOPHAGOSCOPY, GASTROSCOPY, DUODENOSCOPY (EGD), BIOPSY SINGLE OR MULTIPLE;;  Surgeon: Navin Vasquez DO;  Location: [facility]     GLAUCOMA SURGERY      ahmed valve x 1 le x 2 re     GLAUCOMA SURGERY Right 6/16/2015    trabeculectomy with [facility]     HERNIA REPAIR, UMBILICAL  age 6     LASER SURGERY OF EYE      6-7 lasers OU glaucoma     LASER SURGERY OF EYE Right 05/05/2015    [facility]     PROSTATE BIOPSY, NEEDLE, SATURATION SAMPLING  12/04/14    Dr. Zaragoza     Current Outpatient Medications   Medication Sig Dispense Refill     alfuzosin ER (UROXATRAL) 10 MG 24 hr tablet Take 1 tablet (10 mg) by mouth daily 90 tablet 3     aspirin 81 MG tablet Take 1 tablet by mouth daily. * 100 tablet prn     atorvastatin (LIPITOR) 80 MG tablet Take 1 tablet (80 mg) by mouth daily for cholesterol. 90 tablet 3     ciprofloxacin (CIPRO) 500 MG tablet Take 1 tablet (500 mg) by mouth 2 times daily 6 tablet 0     finasteride (PROSCAR) 5 MG tablet Take 1 tablet (5 mg) by mouth daily 90 tablet 3     FLUoxetine  20 MG tablet Take 1 tablet (20 mg) by mouth daily for anxiety prevention. 90 tablet 3     ketoconazole (NIZORAL) 2 % external cream Apply topically 2 times daily as needed (to seborrhea of face) 30 g 1     pantoprazole (PROTONIX) 40 MG EC tablet TAKE 1 TABLET BY MOUTH ONE TIME DAILY FOR REFLUX 90 tablet 3     sildenafil (REVATIO) 20 MG tablet TAKE 1-5 TABS BY MOUTH DAILY AS NEEDED FOR ED. TAKE 1-3 HRS BE4 INTERCOURSE. MAX 1 DOSE/24HRS 30 tablet 4     sulindac (CLINORIL) 150 MG tablet TAKE 1 TABLET (150 MG) BY MOUTH 2 TIMES DAILY AS NEEDED FOR WRIST PAIN. TAKE WITH FOOD. 180 tablet 0     triamcinolone (KENALOG) 0.1 % external ointment Apply topically 2 times daily 15 g 0     valACYclovir (VALTREX) 500 MG tablet TAKE ONE TABLET BY MOUTH EVERY TWELVE HOURS FOR 5 DAYS. START AT EARLIEST ONSET OF SYMPTOMS 20 tablet 1     Gemfibrozil, No clinical screening - see comments, Pilocarpine hydrochloride, and Timoptic [timolol maleate]  Social History     Socioeconomic History     Marital status: Single     Spouse name: Not on file     Number of children: 0     Years of education: 14     Highest education level: Not on file   Occupational History     Not on file   Social Needs     Financial resource strain: Not on file     Food insecurity     Worry: Not on file     Inability: Not on file     Transportation needs     Medical: Not on file     Non-medical: Not on file   Tobacco Use     Smoking status: Never Smoker     Smokeless tobacco: Never Used   Substance and Sexual Activity     Alcohol use: Yes     Comment: weekends     Drug use: No     Sexual activity: Yes     Partners: Female   Lifestyle     Physical activity     Days per week: Not on file     Minutes per session: Not on file     Stress: Not on file   Relationships     Social connections     Talks on phone: Not on file     Gets together: Not on file     Attends Cheondoism service: Not on file     Active member of club or organization: Not on file     Attends meetings of clubs  or organizations: Not on file     Relationship status: Not on file     Intimate partner violence     Fear of current or ex partner: Not on file     Emotionally abused: Not on file     Physically abused: Not on file     Forced sexual activity: Not on file   Other Topics Concern     Parent/sibling w/ CABG, MI or angioplasty before 65F 55M? Not Asked      Service No     Blood Transfusions No     Caffeine Concern No     Occupational Exposure No     Hobby Hazards No     Sleep Concern No     Stress Concern Yes     Comment: anxiety     Weight Concern Yes     Comment: patient is concerned about weight gain     Special Diet No     Back Care Yes     Exercise Yes     Comment: regularly     Bike Helmet No     Seat Belt Yes     Self-Exams Yes     Comment: occasionally   Social History Narrative    ** Merged History Encounter **          Family History   Problem Relation Age of Onset     Cancer Father          lymphoma age 73     Glaucoma Father      Glaucoma Mother 87     Cerebrovascular Disease Mother      Snoring Mother      Cancer Maternal Grandmother      Cancer Other         aunt liver cancer     Diabetes Other         mother's relatives     C.A.D. No family hx of      Macular Degeneration No family hx of      Hypertension No family hx of      Thyroid Disease No family hx of      Anesthesia Reaction No family hx of      Bleeding Disorder No family hx of      Thrombophilia No family hx of      Labs/Studies:     No results found for: PH, PHARTERIAL, PO2, [id number], SAT, PCO2, HCO3, BASEEXCESS, JAEL, BEB  No results found for: TSH  Lab Results   Component Value Date     (H) 2020     (H) 2019     Lab Results   Component Value Date    HGB 14.3 2017     Lab Results   Component Value Date    BUN 16 2020    BUN 14 2019    CR 0.99 2020    CR 0.88 2019     Lab Results   Component Value Date    AST 27 2020    AST 23 2019    ALT 53 2020    ALT 38  06/18/2019    ALKPHOS 91 08/14/2020    ALKPHOS 100 06/18/2019    BILITOTAL 0.4 08/14/2020    BILITOTAL 0.4 06/18/2019     No results found for: UAMP, UBARB, BENZODIAZEUR, UCANN, UCOC, OPIT, UPCP    Recent Labs   Lab Test 08/14/20  0953 06/18/19  0821    141   POTASSIUM 4.6 4.2   CHLORIDE 109 110*   CO2 27 23   ANIONGAP 5 8   * 118*   BUN 16 14   CR 0.99 0.88   LOBO 8.8 8.4*       No results found for: PARAM Connelly DO  Board Certified in Internal Medicine and Sleep Medicine    (Note created with Dragon voice recognition and unintended spelling errors and word substitutions may occur)

## 2021-06-09 ENCOUNTER — VIRTUAL VISIT (OUTPATIENT)
Dept: SLEEP MEDICINE | Facility: CLINIC | Age: 66
End: 2021-06-09
Attending: UROLOGY
Payer: COMMERCIAL

## 2021-06-09 DIAGNOSIS — R06.83 SNORING: ICD-10-CM

## 2021-06-09 DIAGNOSIS — G47.63 SLEEP RELATED BRUXISM: ICD-10-CM

## 2021-06-09 DIAGNOSIS — N39.44 NOCTURNAL ENURESIS: ICD-10-CM

## 2021-06-09 DIAGNOSIS — G47.10 HYPERSOMNIA: Primary | ICD-10-CM

## 2021-06-09 PROCEDURE — 99203 OFFICE O/P NEW LOW 30 MIN: CPT | Mod: 95 | Performed by: INTERNAL MEDICINE

## 2021-06-09 NOTE — PATIENT INSTRUCTIONS
Patient education: What is a sleep study?     What is a sleep study? -- A sleep study is a test that measures how well you sleep and checks for sleep problems. For some sleep studies, you stay overnight in a sleep lab at a hospital or sleep center.     What happens during a sleep study? -- Before you go to sleep, a technician attaches small, sticky patches called  electrodes  to your head, chest, and legs. He or she will also place a small tube beneath your nose and might wrap 1 or 2 belts around your chest.   Each of these items has wires that connect to monitors. The monitors record your movement, brain activity, breathing, and other body functions while you sleep.  If you have a history of trouble falling asleep, your doctor might prescribe a medicine to help you fall asleep in the lab. If you have never taken the medicine before, your doctor might ask you take it on a night before your sleep study to see how it affects you.   Why might my doctor order a sleep study? -- Your doctor will order a sleep study if he or she thinks you have sleep apnea or a different condition that makes you:   ?Have sudden jerking leg movements while you sleep, called  periodic limb movements.    ?Feel very sleepy during the day and fall asleep all of a sudden, called  narcolepsy.    ?Have trouble falling asleep or staying asleep over a long period of time, called  chronic insomnia.    ?Do odd things while you sleep, such as walking.  How should I prepare for a sleep study? -- On the day of your sleep study, you should:   ?Avoid alcohol   ?Avoid drinking coffee, tea, sodas, and other drinks that have caffeine in the afternoon and evening   ?Take all of your regular medicines     The cost of care estimate line is [phone]. They are able to give the patient an estimate of the charges and also an estimate of their insurance coverage/patient responsibility.   After your sleep study is performed, please call us at [phone] or  [phone]  to schedule for a follow up to review the results of the sleep study.    Please bring one tab of low dose melatonin 3 mg or less to the night of the study.    Melatonin intake is completely voluntary.    You may take own melatonin after arrival to sleep center. Do not drive or operate machinery after intake of melatonin.

## 2021-06-17 ENCOUNTER — MYC MEDICAL ADVICE (OUTPATIENT)
Dept: FAMILY MEDICINE | Facility: CLINIC | Age: 66
End: 2021-06-17

## 2021-06-17 RX ORDER — KETOCONAZOLE 20 MG/G
CREAM TOPICAL 2 TIMES DAILY PRN
Qty: 30 G | Refills: 1 | OUTPATIENT
Start: 2021-06-17

## 2021-06-17 NOTE — TELEPHONE ENCOUNTER
Message routed to provider on 6/8/21 has not been addressed.   Re-routing to provider to review and advise regarding refill request.    Routing refill request to provider for review/approval because:  Not ordered since 2019    RADHA RodriguezN, RN

## 2021-06-17 NOTE — TELEPHONE ENCOUNTER
Duplicate request. Initial request from 6/7/21 rerouted to provider.    Christine Thomas, RADHAN, RN

## 2021-07-11 NOTE — TELEPHONE ENCOUNTER
Routing refill request to provider for review/approval because:  Medication Warning  Patient due for follow up June.  Michelle Oakley RN         mild

## 2021-08-09 ENCOUNTER — THERAPY VISIT (OUTPATIENT)
Dept: SLEEP MEDICINE | Facility: CLINIC | Age: 66
End: 2021-08-09
Payer: COMMERCIAL

## 2021-08-09 DIAGNOSIS — G47.63 SLEEP RELATED BRUXISM: ICD-10-CM

## 2021-08-09 DIAGNOSIS — G47.10 HYPERSOMNIA: ICD-10-CM

## 2021-08-09 DIAGNOSIS — R06.83 SNORING: ICD-10-CM

## 2021-08-09 DIAGNOSIS — N39.44 NOCTURNAL ENURESIS: ICD-10-CM

## 2021-08-09 PROCEDURE — 95810 POLYSOM 6/> YRS 4/> PARAM: CPT | Performed by: INTERNAL MEDICINE

## 2021-08-10 LAB — SLPCOMP: NORMAL

## 2021-08-20 ENCOUNTER — VIRTUAL VISIT (OUTPATIENT)
Dept: SLEEP MEDICINE | Facility: CLINIC | Age: 66
End: 2021-08-20
Payer: COMMERCIAL

## 2021-08-20 VITALS — BODY MASS INDEX: 30.8 KG/M2 | WEIGHT: 220 LBS | HEIGHT: 71 IN

## 2021-08-20 DIAGNOSIS — F45.8 BRUXISM (TEETH GRINDING): ICD-10-CM

## 2021-08-20 DIAGNOSIS — R06.83 SNORING: Primary | ICD-10-CM

## 2021-08-20 DIAGNOSIS — K21.9 GASTROESOPHAGEAL REFLUX DISEASE WITHOUT ESOPHAGITIS: ICD-10-CM

## 2021-08-20 PROCEDURE — 99214 OFFICE O/P EST MOD 30 MIN: CPT | Mod: 95 | Performed by: INTERNAL MEDICINE

## 2021-08-20 RX ORDER — PANTOPRAZOLE SODIUM 40 MG/1
TABLET, DELAYED RELEASE ORAL
Qty: 90 TABLET | Refills: 3 | Status: CANCELLED | OUTPATIENT
Start: 2021-08-20

## 2021-08-20 ASSESSMENT — ENCOUNTER SYMPTOMS
NAUSEA: 0
HEMATOCHEZIA: 0
HEMATURIA: 0
ARTHRALGIAS: 1
HEARTBURN: 0
DYSURIA: 0
FREQUENCY: 1
FEVER: 0
EYE PAIN: 0
PALPITATIONS: 0
CHILLS: 0
DIZZINESS: 1
PARESTHESIAS: 0
WEAKNESS: 0
JOINT SWELLING: 0
CONSTIPATION: 0
HEADACHES: 0
NERVOUS/ANXIOUS: 0
DIARRHEA: 0
SHORTNESS OF BREATH: 0
ABDOMINAL PAIN: 0
MYALGIAS: 0
COUGH: 0
SORE THROAT: 0

## 2021-08-20 ASSESSMENT — MIFFLIN-ST. JEOR: SCORE: 1810.04

## 2021-08-20 NOTE — TELEPHONE ENCOUNTER
pantoprazole (PROTONIX) 40 MG EC tablet 90 tablet 3 8/14/2020     Plan does not cover this medication. Please call plan at [phone] to initiate prior authorization or call/fax pharmacy to change medication. Patient ID # [id number].

## 2021-08-20 NOTE — PROGRESS NOTES
"Grey Markham is a 65 year old male being evaluated via a billable telephone visit.     \"This telephone visit will be conducted via a call between you and your physician/provider. We have found that certain health care needs can be provided without the need for an in-person visit or physical exam.  This service lets us provide the care you need with a telephone conversation.  If a prescription is necessary we can send it directly to your pharmacy.  If lab work is needed we can place an order for that and you can then stop by our lab to have the test done at a later time.\"    Telephone visits are billed at different rates depending on your insurance coverage.  Please reach out to your insurance provider with any questions.    Patient has given verbal consent for  a Telephone visit? Yes    What telephone number would you like your provider to contact at at:  [phone]    How would you like to obtain your AVS? Gavi Melton MA    Telephone Visit Details:     Telephone Visit Start Time: 3:15 PM    Telephone Visit End Time: 3:29 PM      [facility]     Sleep clinic follow up visit note    Date on this visit: 8/20/2021    Primary Physician: Navin Euceda     Chief complaint: Review results of recently obtained sleep study    Grey Markham 65 year old male with PMH of GERD, CAD, dyslipidemia, who underwent a  diagnostic polysomnogram on 8/9/21  to evaluate for sleep apnea.  He presents to sleep clinic for telephone visit today to review the results of his recently sleep study. He was previously following up with .    Polysomnogram report:  Study Date: 8/9/2021  Sleep Architecture:   The total recording time of the polysomnogram was 471.4 minutes. The total sleep time was 283.5 minutes. Sleep latency was increased at 66.7 minutes with the use of a sleep aid (Melatonin 6 mg). REM latency was 199.0 minutes. Arousal index was normal/increased at 41.1 arousals per hour. Sleep " efficiency was decreased at 60.1%. Wake after sleep onset was 120.0 minutes. The patient spent 15.7% of total sleep time in Stage N1, 44.6% in Stage N2, 14.5% in Stage N3, and 25.2% in REM. Time in REM supine was - minutes.     Respiration:    Events ? The polysomnogram revealed a presence of 7 obstructive, - central, and 3 mixed apneas resulting in an apnea index of 2.1 events per hour. There were - obstructive hypopneas and - central hypopneas resulting in an obstructive hypopnea index of - and central hypopnea index of - events per hour. The combined apnea/hypopnea index was 2.1 events per hour (central apnea/hypopnea index was - events per hour). The REM AHI was 5.9 events per hour. The supine AHI was - events per hour. The RERA index was 4.7 events per hour. The RDI was 6.8 events per hour.     Snoring - was reported as loud and intermittent.   Respiratory rate and pattern - was notable for few obstructive apnea and hypopneas.     Sleep Associated Hypoxemia - (Greater than 5 minutes O2 sat at or below 88%) was not present. Baseline oxygen saturation was 95.3%. Lowest oxygen saturation was 90.4%. Time spent less than or equal to 88% was 0 minutes. Time spent less than or equal to 89% was 0 minutes.    Movement Activity: Some REM sleep without atonia was appreciated.     Periodic Limb Activity - There were 31 PLMs during the entire study. The PLM index was 6.6 movements per hour. The PLM Arousal Index was 1.5 per hour.     REM EMG Activity - Excessive transient muscle activity was present.     Nocturnal Behavior - Abnormal sleep related behaviors were not noted during sleep.     Cardiac Summary: Arrhythmias were not noted.       The test results were discussed with the patient in detail.      Allergies:    Allergies   Allergen Reactions     Gemfibrozil Diarrhea     No Clinical Screening - See Comments      All glaucoma drops except travatan     Pilocarpine Hydrochloride      Eye drops     Timoptic [Timolol  Maleate]      Eye drops only       Medications:    Current Outpatient Medications   Medication Sig Dispense Refill     alfuzosin ER (UROXATRAL) 10 MG 24 hr tablet Take 1 tablet (10 mg) by mouth daily 90 tablet 3     aspirin 81 MG tablet Take 1 tablet by mouth daily. * 100 tablet prn     atorvastatin (LIPITOR) 80 MG tablet Take 1 tablet (80 mg) by mouth daily for cholesterol. 90 tablet 3     ciprofloxacin (CIPRO) 500 MG tablet Take 1 tablet (500 mg) by mouth 2 times daily 6 tablet 0     finasteride (PROSCAR) 5 MG tablet Take 1 tablet (5 mg) by mouth daily 90 tablet 3     FLUoxetine 20 MG tablet Take 1 tablet (20 mg) by mouth daily for anxiety prevention. 90 tablet 3     ketoconazole (NIZORAL) 2 % external cream Apply topically 2 times daily as needed (to seborrhea of face) 30 g 1     pantoprazole (PROTONIX) 40 MG EC tablet TAKE 1 TABLET BY MOUTH ONE TIME DAILY FOR REFLUX 90 tablet 3     sildenafil (REVATIO) 20 MG tablet TAKE 1-5 TABS BY MOUTH DAILY AS NEEDED FOR ED. TAKE 1-3 HRS BE4 INTERCOURSE. MAX 1 DOSE/24HRS 30 tablet 4     sulindac (CLINORIL) 150 MG tablet TAKE 1 TABLET (150 MG) BY MOUTH 2 TIMES DAILY AS NEEDED FOR WRIST PAIN. TAKE WITH FOOD. 180 tablet 0     triamcinolone (KENALOG) 0.1 % external ointment Apply topically 2 times daily 15 g 0     valACYclovir (VALTREX) 500 MG tablet TAKE ONE TABLET BY MOUTH EVERY TWELVE HOURS FOR 5 DAYS. START AT EARLIEST ONSET OF SYMPTOMS 20 tablet 1       Problem List:  Patient Active Problem List    Diagnosis Date Noted     Benign prostatic hyperplasia with lower urinary tract symptoms, symptom details unspecified 11/02/2020     Priority: Medium     Combined arterial insufficiency and corporo-venous occlusive erectile dysfunction 12/06/2016     Priority: Medium     Photophobia of both eyes 05/17/2016     Priority: Medium     Dysuria 11/12/2014     Priority: Medium     Obesity, Class I, BMI 30-34.9 10/07/2014     Priority: Medium     Elevated prostate specific antigen (PSA)  10/07/2014     Priority: Medium     Negative biopsy 7/7/16       Glaucoma      Priority: Medium     Imo Update utility       Advance care planning 06/26/2012     Priority: Medium     Advance Care Planning 08/21/2015: Discussed advance care planning with patient; information given to patient to review 08/21/2015  Navin Euceda MD   Advance Care Planning 06/26/2012: Discussed advance care planning with patient; information given to patient to review. 6/26/2012  KIM Melton MA                   Anxiety 06/21/2011     Priority: Medium     Oral herpes      Priority: Medium     Hyperlipidemia LDL goal <100 01/04/2010     Priority: Medium     CAD, hypertriglyceridemia       Atherosclerosis of native coronary artery of native heart without angina pectoris 12/30/2009     Priority: Medium     10%  mid-LAD lesion, Dr. Mendoza       Gastroesophageal reflux disease without esophagitis 11/01/2007     Priority: Medium     no Rose's          Past Medical/Surgical History:  Past Medical History:   Diagnosis Date     Coronary atherosclerosis of native coronary artery 12/30/09    10%  mid-LAD lesion, Dr. Mendoza     DDD (degenerative disc disease), lumbar     L-4-L5, L5-S1     GERD (gastroesophageal reflux disease) 11/2007    no Rose's     Glaucoma      Glaucoma      Hyperlipidemia LDL goal <100     hyper-TG, on Simv 80mg since at least 1/4/10     Oral herpes      Past Surgical History:   Procedure Laterality Date     ARTHROSCOPY KNEE RT/LT  2/22/08    RT MMT, Dr. Shaista HADDAD GLAUCOMA SURG,IRIDENCLEISIS  4/03    LASER     C GLAUCOMA SURG,IRIDENCLEISIS  6/06    laser     CARDIAC CATHERIZATION  12/30/09    10% stenosis of mid LAD, EF 70%, nl otherwise Dr. Elliot Mendoza, [facility]     CATARACT IOL, RT/LT Bilateral 5/26/16 (right), June 2016 (left)     COLONOSCOPY WITH CO2 INSUFFLATION N/A 7/9/2018    Procedure: COLONOSCOPY WITH CO2 INSUFFLATION;  Screen for colon cancer;  Surgeon: Navin Vasquez DO;  Location:  OR     COMBINED  ESOPHAGOSCOPY, GASTROSCOPY, DUODENOSCOPY (EGD) WITH CO2 INSUFFLATION N/A 6/14/2017    Procedure: COMBINED ESOPHAGOSCOPY, GASTROSCOPY, DUODENOSCOPY (EGD) WITH CO2 INSUFFLATION;  EGD-GASTROESOPHAGEAL REFLUX DISEASE WITHOUT ESOPHAGITIS/ DARNELL;  Surgeon: Navin Vasquez DO;  Location: [facility] OR     ESOPHAGOSCOPY, GASTROSCOPY, DUODENOSCOPY (EGD), COMBINED N/A 6/14/2017    Procedure: COMBINED ESOPHAGOSCOPY, GASTROSCOPY, DUODENOSCOPY (EGD), BIOPSY SINGLE OR MULTIPLE;;  Surgeon: Navin Vasquez DO;  Location: [facility] OR     GLAUCOMA SURGERY      ahmed valve x 1 le x 2 re     GLAUCOMA SURGERY Right 6/16/2015    trabeculectomy with [facility]     HERNIA REPAIR, UMBILICAL  age 6     LASER SURGERY OF EYE      6-7 lasers OU glaucoma     LASER SURGERY OF EYE Right 05/05/2015    [facility]     PROSTATE BIOPSY, NEEDLE, SATURATION SAMPLING  12/04/14    Dr. Zaragoza       Social History:  Social History     Socioeconomic History     Marital status: Single     Spouse name: Not on file     Number of children: 0     Years of education: 14     Highest education level: Not on file   Occupational History     Not on file   Tobacco Use     Smoking status: Never Smoker     Smokeless tobacco: Never Used   Substance and Sexual Activity     Alcohol use: Yes     Comment: weekends     Drug use: No     Sexual activity: Yes     Partners: Female   Other Topics Concern     Parent/sibling w/ CABG, MI or angioplasty before 65F 55M? Not Asked      Service No     Blood Transfusions No     Caffeine Concern No     Occupational Exposure No     Hobby Hazards No     Sleep Concern No     Stress Concern Yes     Comment: anxiety     Weight Concern Yes     Comment: patient is concerned about weight gain     Special Diet No     Back Care Yes     Exercise Yes     Comment: regularly     Bike Helmet No     Seat Belt Yes     Self-Exams Yes     Comment: occasionally   Social History Narrative    ** Merged History Encounter **          Social Determinants of Health     Financial  "Resource Strain:      Difficulty of Paying Living Expenses:    Food Insecurity:      Worried About Running Out of Food in the Last Year:      Ran Out of Food in the Last Year:    Transportation Needs:      Lack of Transportation (Medical):      Lack of Transportation (Non-Medical):    Physical Activity:      Days of Exercise per Week:      Minutes of Exercise per Session:    Stress:      Feeling of Stress :    Social Connections:      Frequency of Communication with Friends and Family:      Frequency of Social Gatherings with Friends and Family:      Attends Yazidism Services:      Active Member of Clubs or Organizations:      Attends Club or Organization Meetings:      Marital Status:    Intimate Partner Violence:      Fear of Current or Ex-Partner:      Emotionally Abused:      Physically Abused:      Sexually Abused:        Family History:  Family History   Problem Relation Age of Onset     Cancer Father          lymphoma age 73     Glaucoma Father      Glaucoma Mother 87     Cerebrovascular Disease Mother      Snoring Mother      Cancer Maternal Grandmother      Cancer Other         aunt liver cancer     Diabetes Other         mother's relatives     C.A.D. No family hx of      Macular Degeneration No family hx of      Hypertension No family hx of      Thyroid Disease No family hx of      Anesthesia Reaction No family hx of      Bleeding Disorder No family hx of      Thrombophilia No family hx of          Physical Examination:  Vitals: Ht 1.803 m (5' 11\")   Wt 99.8 kg (220 lb)   BMI 30.68 kg/m    BMI= Body mass index is 30.68 kg/m .    Manlius Total Score 2021   Total score - Manlius 3     General: No apparent distress  Psych: coherent speech, normal rate and volume, able to articulate logical thoughts, able   to abstract reason, no tangential thoughts, no hallucinations   or delusions  His affect is normal  Neuro:  Mental status: Alert and  Oriented X 3  Speech: normal     Impression/Plan:  Snoring " "was reported during the polysomnography.  There are  few obstructive events observed during REM sleep, and some respiratory effort related arousals(RERAs). However there is no evidence of clinically significant sleep-related breathing disorder with the overall apnea-hypopnea index being less than 5 events per hour.  Patient slept exclusively in the nonsupine position and reports that that is how he sleeps even at home.   Patient was encouraged to continue sleep on his sides.  We discussed the option of dental appliance to control snoring and the obstructive events noted during REM sleep and RERAs.  Patient uses a mouthguard for bruxism.  Referral has been provided to sleep dentistry to obtain modified dental appliance to control snoring and bruxism.  We discussed how the dental appliance works.   Information handout was provided about the dental appliance.  Repeat sleep study(HST) with dental appliance was not been recommended since there was no evidence of clinically significant sleep-related breathing disorder during the study.      Obesity: We discussed weight management with healthy diet, and exercise.    Patient was strongly advised to avoid driving, operating any heavy machinery or other hazardous situations while drowsy or sleepy.  Patient was counseled on the importance of driving while alert, to pull over if drowsy, or nap before getting into the vehicle if sleepy.      Follow-up at sleep clinic as needed    CC: No ref. provider found      The above note was dictated using voice recognition software. Although reviewed after completion, some word and grammatical error may remain . Please contact the author for any clarifications.    \"I spent a total of 30  minutes  with Grey Markham during today's  Telephone visit, most  of this time was spent counseling the patient and  coordinating care regarding snoring, dental appliance,  weight management , going over results of the sleep study , chart review  " "including documentation and further activities as noted above.\"      Nathalie Araya MD  [facility]   [address], [address]   [address] [place] [address]   Appointments: [phone]     "

## 2021-08-21 NOTE — PATIENT INSTRUCTIONS
Your BMI is Body mass index is 30.68 kg/m .    What is BMI?  Body mass index (BMI) is one way to tell whether you are at a healthy weight, overweight, or obese. It measures your weight in relation to your height.  A BMI of 18.5 to 24.9 is in the healthy range. A person with a BMI of 25 to 29.9 is considered overweight, and someone with a BMI of 30 or greater is considered obese.  Another way to find out if you are at risk for health problems caused by overweight and obesity is to measure your waist. If you are a woman and your waist is more than 35 inches, or if you are a man and your waist is more than 40 inches, your risk of disease may be higher.  More than two-thirds of American adults are considered overweight or obese. Being overweight or obese increases the risk for further weight gain.  Excess weight may lead to heart disease and diabetes. Creating and following plans for healthy eating and physical activity may help you improve your health.    Methods for maintaining or losing weight.  Weight control is part of healthy lifestyle and includes exercise, emotional health, and healthy eating habits.  Careful eating habits lifelong is the mainstay of weight control.  Though there are significant health benefits from weight loss, long-term weight loss with diet alone may be very difficult to achieve- studies show long-term success with dietary management in less than 10% of people. Attaining a healthy weight may be especially difficult to achieve in those with severe obesity. In some cases, medications, devices and surgical management might be considered.    What can you do?  If you are overweight or obese and are interested in methods for weight loss, you should discuss this with your provider. In addition, we recommend that you review healthy life styles and methods for weight loss available through the National Institutes of Health patient information sites:    http://win.niddk.nih.gov/publications/index.htm      Oral Appliance  What is oral appliance therapy?  An oral appliance device fits on your teeth at night like a retainer used after having braces. The device is made by a specialized dentist and requires several visits over 1-2 months before a manufactured device is made to fit your teeth and is adjusted to prevent your sleep apnea. Once an oral device is working properly, snoring should be improved. A home sleep test may be recommended at that time if to determine whether the sleep apnea is adequately treated.       Some things to remember:  -Oral devices are often, but not always, covered by your medical insurance. Be sure to check with your insurance provider.   -If you are referred for oral therapy, you will be given a list of specialized dentists to consider or you may choose to visit the Web site of the American Academy of Dental Sleep Medicine  -Oral devices are less likely to work if you have severe sleep apnea or are extremely overweight.     More detailed information  An oral appliance is a small acrylic device that fits over the upper and lower teeth  (similar to a retainer or a mouth guard). This device slightly moves jaw forward, which moves the base of the tongue forward, opens the airway, improves breathing for effective treat snoring and obstructive sleep apnea in perhaps 7 out of 10 people .  The best working devices are custom-made by a dental device  after a mold is made of the teeth 1, 2, 3.  When is an oral appliance indicated?  Oral appliance therapy is recommended as a first-line treatment for patients with primary snoring, mild sleep apnea, and for patients with moderate sleep apnea who prefer appliance therapy to use of CPAP4, 5. Severity of sleep apnea is determined by sleep testing and is based on the number of respiratory events per hour of sleep.   How successful is oral appliance therapy?  The success rate of oral appliance  therapy in patients with mild sleep apnea is 75-80% while in patients with moderate sleep apnea it is 50-70%. The chance of success in patients with severe sleep apnea is 40-50%. The research also shows that oral appliances have a beneficial effect on the cardiovascular health of JASON patients at the same magnitude as CPAP therapy7.  Oral appliances should be a second-line treatment in cases of severe sleep apnea, but if not completely successful then a combination therapy utilizing CPAP plus oral appliance therapy may be effective. Oral appliances tend to be effective in a broad range of patients although studies show that the patients who have the highest success are females, younger patients, those with milder disease, and less severe obesity. 3, 6.   Finding a dentist that practices dental sleep medicine  Specific training is available through the American Academy of Dental Sleep Medicine for dentists interested in working in the field of sleep. To find a dentist who is educated in the field of sleep and the use of oral appliances, near you, visit the Web site of the American Academy of Dental Sleep Medicine.    References  1. Ron et al. Objectively measured vs self-reported compliance during oral appliance therapy for sleep-disordered breathing. Chest 2013; 144(5): 3695-4735.  2. Hina, et al. Objective measurement of compliance during oral appliance therapy for sleep-disordered breathing. Thorax 2013; 68(1): 91-96.  3. Akila et al. Mandibular advancement devices in 620 men and women with JASON and snoring: tolerability and predictors of treatment success. Chest 2004; 125: 9429-1303.  4. Dahlia, et al. Oral appliances for snoring and JASON: a review. Sleep 2006; 29: 244-262.  5. Rosie et al. Oral appliance treatment for JAOSN: an update. J Clin Sleep Med 2014; 10(2): 215-227.  6. Ghazala et al. Predictors of OSAH treatment outcome. J Dent Res 2007; 86: 2893-2033.

## 2021-08-23 RX ORDER — PANTOPRAZOLE SODIUM 40 MG/1
TABLET, DELAYED RELEASE ORAL
Qty: 90 TABLET | Refills: 3 | Status: SHIPPED | OUTPATIENT
Start: 2021-08-23 | End: 2022-03-01

## 2021-08-26 ENCOUNTER — TELEPHONE (OUTPATIENT)
Dept: FAMILY MEDICINE | Facility: CLINIC | Age: 66
End: 2021-08-26
Payer: MEDICAID

## 2021-08-26 DIAGNOSIS — F41.9 ANXIETY: ICD-10-CM

## 2021-08-26 DIAGNOSIS — E78.5 HYPERLIPIDEMIA LDL GOAL <100: ICD-10-CM

## 2021-08-26 DIAGNOSIS — I25.10 ATHEROSCLEROSIS OF NATIVE CORONARY ARTERY OF NATIVE HEART WITHOUT ANGINA PECTORIS: ICD-10-CM

## 2021-08-26 DIAGNOSIS — B00.2 HERPETIC GINGIVOSTOMATITIS: ICD-10-CM

## 2021-08-26 RX ORDER — VALACYCLOVIR HYDROCHLORIDE 500 MG/1
TABLET, FILM COATED ORAL
Qty: 20 TABLET | Refills: 1 | Status: SHIPPED | OUTPATIENT
Start: 2021-08-26 | End: 2021-10-04

## 2021-08-26 RX ORDER — ATORVASTATIN CALCIUM 80 MG/1
80 TABLET, FILM COATED ORAL DAILY
Qty: 90 TABLET | Refills: 3 | Status: SHIPPED | OUTPATIENT
Start: 2021-08-26 | End: 2022-08-22

## 2021-08-26 RX ORDER — FLUOXETINE 20 MG/1
20 TABLET, FILM COATED ORAL DAILY
Qty: 90 TABLET | Refills: 3 | Status: SHIPPED | OUTPATIENT
Start: 2021-08-26 | End: 2022-08-18

## 2021-08-26 NOTE — TELEPHONE ENCOUNTER
atorvastatin (LIPITOR) 80 MG tablet 90 tablet 3 8/14/2020     valACYclovir (VALTREX) 500 MG tablet 20 tablet 1 5/6/2021      FLUoxetine 20 MG tablet 90 tablet 3 8/14/2020

## 2021-08-26 NOTE — TELEPHONE ENCOUNTER
pantoprazole (PROTONIX) 40 MG EC tablet 90 tablet 3 8/23/2021      Plan does not cover this medication. Please call plan at [phone] to initiate PA or call/fax pharmacy to change medication. Patient ID # is [id number]. Max 120 tabs per year.

## 2021-08-26 NOTE — PATIENT INSTRUCTIONS
Preventive Health Recommendations  Male Ages 50 - 64    Yearly exam:             See your health care provider every year in order to  o   Review health changes.   o   Discuss preventive care.    o   Review your medicines if your doctor has prescribed any.     Have a cholesterol test every 5 years, or more frequently if you are at risk for high cholesterol/heart disease.     Have a diabetes test (fasting glucose) every three years. If you are at risk for diabetes, you should have this test more often.     Have a colonoscopy at age 50, or have a yearly FIT test (stool test). These exams will check for colon cancer.      Talk with your health care provider about whether or not a prostate cancer screening test (PSA) is right for you.    You should be tested each year for STDs (sexually transmitted diseases), if you re at risk.     Shots: Get a flu shot each year. Get a tetanus shot every 10 years.     Nutrition:    Eat at least 5 servings of fruits and vegetables daily.     Eat whole-grain bread, whole-wheat pasta and brown rice instead of white grains and rice.     Get adequate Calcium and Vitamin D.     Lifestyle    Exercise for at least 150 minutes a week (30 minutes a day, 5 days a week). This will help you control your weight and prevent disease.     Limit alcohol to one drink per day.     No smoking.     Wear sunscreen to prevent skin cancer.     See your dentist every six months for an exam and cleaning.     See your eye doctor every 1 to 2 years.    Patient Education     Tinnitus (Ringing in the Ears)     Treatment may include maskers and hearing aids.   Tinnitus is the term for a noise in your ear not caused by an outside sound. The noise might be a ringing, clicking, hiss, or roar. It can vary in pitch. It may be soft or very loud. For some people, this is a minor problem. But for others, the noise can make it hard to hear, work, and even sleep. When tinnitus can't be cured, treatments may help.  What  causes tinnitus?  Loud noises, hearing loss, and earwax can cause tinnitus. So can certain medicines. Large amounts of aspirin or caffeine are sometimes to blame. In many cases, the exact cause of tinnitus is not known.  How is tinnitus treated?  Finding and removing the cause is the best way to treat tinnitus. So your healthcare provider may refer you to an ear, nose, and throat doctor (ENT or otolaryngologist). Your hearing may also be checked by a hearing specialist (audiologist). If you have hearing loss, wearing a hearing aid may help your tinnitus. When the cause can't be found, the tinnitus itself may be treated. Some of the treatments are listed below. Your healthcare provider can tell you more about them:    Maskers. These are small devices that look like hearing aids. They have a pleasant sound that helps cover up the ringing in your ears. Hearing aids and maskers are sometimes used together.    Medicines that treat anxiety and depression. These may ease tinnitus in some people.    Hypnosis or relaxation therapy. This may help head noise seem less severe.    Tinnitus retraining therapy. This combines counseling and maskers. Both can help take your mind off the tinnitus.  To learn more    American Speech-Hearing-Language Association [phone] www.radha.org    American Tinnitus Association [phone] www.zurdo.org    National [place] on Deafness and other Communication Disorders [phone] www.nidcd.nih.gov/    Mihaela last reviewed this educational content on 9/1/2019 2000-2021 [employer]. All rights reserved. This information is not intended as a substitute for professional medical care. Always follow your healthcare professional's instructions.           Patient Education     Tinnitus Management Program  Tinnitus is a ringing, buzzing, or other sound in your head. About 50 million people in the [place] have tinnitus at some point in their life.   The Tinnitus Management Program offers  "testing, teaching, counseling and sound therapy for tinnitus. Our goal is to help you better understand your tinnitus and manage its effects.  Who is this program for?  The Tinnitus Management Program is for people whose tinnitus bothers them and lasts 3 to 6 months or longer.  How does it work?  We will set up a meeting for you with a specialist who treats hearing problems, called an audiologist (bu-pok-SKY-lia-jist). This meeting is called an \"initial consultation.\"  Before this meeting you need:    The results of a recent hearing test    An OK called a \"medical clearance\" from an Ear, Nose and Throat doctor (ENT)  You will also need to fill in these 2 forms:  1. Tinnitus History Questionnaire  2. Tinnitus Handicap Inventory (THI)  What happens at the initial consultation?  Your first meeting will be a 90-minute appointment. Please feel free to bring a family member or friend. Many insurance plans cover this appointment, including Medicare.  The audiologist will:    Do more tinnitus testing, if needed.    Review your tinnitus history and questionnaire.    Talk with you about testing and treatment options.    Set up a tinnitus management program for you.    Answer your questions.  What happens in the program?  We will create a program just for you. Tinnitus management programs may include:    Tinnitus Retraining Therapy    Sound Therapy    Cognitive Behavioral Therapy    Health Psychology  Tinnitus retraining therapy  Retraining is detailed teaching to help you better understand where tinnitus comes from and how your body may react. We train you to hear your tinnitus as neutral, or \"white\" noise.  Sound therapy  Sound therapy may include an iPod, table-top sound device, Sound Pillow, or even using different hearing aids together.  Cognitive behavioral therapy  We will talk about ways to help you notice and change how you think about your tinnitus.   Health psychology  This is a specialty that helps people cope with " "the stress and worry of health problems.  Is there any follow-up?  Yes. You will have at least 1 follow up session. That way we can track your progress, give you more training, and even change your plan if needed.  Managing your tinnitus won't happen overnight. But we will strive to give you training and skills to help you cope. That way, you can focus on the important things in your life.  Will my insurance pay for the program?  Check with your insurance company. The charge code for the Tinnitus Program is 70110.  How do I get started?  To set up your first meeting with an audiologist, please call:  [facility] [phone]  For informational purposes only. Not to replace the advice of your health care provider. Copyright   2016 [facility]. All rights reserved. Clinically reviewed by Dandy Boyd, CCC-A. [employer] [id number] - Rev 10/19.            Patient Education     Tinnitus Management Program  Tinnitus is a ringing, buzzing, or other sound in your head. About 50 million people in the [place] have tinnitus at some point in their life.   The Tinnitus Management Program offers testing, teaching, counseling and sound therapy for tinnitus. Our goal is to help you better understand your tinnitus and manage its effects.  Who is this program for?  The Tinnitus Management Program is for people whose tinnitus bothers them and lasts 3 to 6 months or longer.  How does it work?  We will set up a meeting for you with a specialist who treats hearing problems, called an audiologist (sz-wpf-JUI-lia-jist). This meeting is called an \"initial consultation.\"  Before this meeting you need:    The results of a recent hearing test    An OK called a \"medical clearance\" from an Ear, Nose and Throat doctor (ENT)  You will also need to fill in these 2 forms:  3. Tinnitus History Questionnaire  4. Tinnitus Handicap Inventory (THI)  What happens at the initial " "consultation?  Your first meeting will be a 90-minute appointment. Please feel free to bring a family member or friend. Many insurance plans cover this appointment, including Medicare.  The audiologist will:    Do more tinnitus testing, if needed.    Review your tinnitus history and questionnaire.    Talk with you about testing and treatment options.    Set up a tinnitus management program for you.    Answer your questions.  What happens in the program?  We will create a program just for you. Tinnitus management programs may include:    Tinnitus Retraining Therapy    Sound Therapy    Cognitive Behavioral Therapy    Health Psychology  Tinnitus retraining therapy  Retraining is detailed teaching to help you better understand where tinnitus comes from and how your body may react. We train you to hear your tinnitus as neutral, or \"white\" noise.  Sound therapy  Sound therapy may include an iPod, table-top sound device, Sound Pillow, or even using different hearing aids together.  Cognitive behavioral therapy  We will talk about ways to help you notice and change how you think about your tinnitus.   Health psychology  This is a specialty that helps people cope with the stress and worry of health problems.  Is there any follow-up?  Yes. You will have at least 1 follow up session. That way we can track your progress, give you more training, and even change your plan if needed.  Managing your tinnitus won't happen overnight. But we will strive to give you training and skills to help you cope. That way, you can focus on the important things in your life.  Will my insurance pay for the program?  Check with your insurance company. The charge code for the Tinnitus Program is 22072.  How do I get started?  To set up your first meeting with an audiologist, please call:  [facility] [phone]  For informational purposes only. Not to replace the advice of your health care provider. " Copyright   2016 [facility]. All rights reserved. Clinically reviewed by Dandy Boyd, CCC-A. [employer] [id number] - Rev 10/19.

## 2021-08-26 NOTE — PROGRESS NOTES
"SUBJECTIVE:   CC: Grey Markham is an 65 year old male who presents for preventative health visit. He has a \"list\" of other things he would like to discuss.        Healthy Habits:     Getting at least 3 servings of Calcium per day:  NO    Bi-annual eye exam:  Yes    Dental care twice a year:  Yes    Sleep apnea or symptoms of sleep apnea:  Excessive snoring    Diet:  Low fat/cholesterol and Breakfast skipped    Frequency of exercise:  4-5 days/week    Duration of exercise:  Greater than 60 minutes    Taking medications regularly:  Yes    Medication side effects:  Lightheadedness    PHQ-2 Total Score: 0    Additional concerns today:  No      Hyperlipidemia Follow-Up      Are you regularly taking any medication or supplement to lower your cholesterol?   Yes- atorvaststin    Are you having muscle aches or other side effects that you think could be caused by your cholesterol lowering medication?  No    Vascular Disease Follow-up      How often do you take nitroglycerin? Never    Do you take an aspirin every day? No       Anxiety Follow-Up    How are you doing with your anxiety since your last visit? No change    Are you having other symptoms that might be associated with anxiety? No    Have you had a significant life event? No     Are you feeling depressed? No    Do you have any concerns with your use of alcohol or other drugs? No    Social History     Tobacco Use     Smoking status: Never Smoker     Smokeless tobacco: Never Used   Substance Use Topics     Alcohol use: Yes     Comment: weekends     Drug use: No     BREANA-7 SCORE 8/8/2013 12/6/2016 8/27/2021   Total Score 0 - -   Total Score - 0 3     PHQ 12/6/2016   PHQ-9 Total Score 2   Q9: Thoughts of better off dead/self-harm past 2 weeks Not at all     BREANA-7  8/27/2021   1. Feeling nervous, anxious, or on edge 1   2. Not being able to stop or control worrying 0   3. Worrying too much about different things 1   4. Trouble relaxing 0   5. Being so restless that it is " hard to sit still 0   6. Becoming easily annoyed or irritable 0   7. Feeling afraid, as if something awful might happen 1   BREANA-7 Total Score 3   If you checked any problems, how difficult have they made it for you to do your work, take care of things at home, or get along with other people? Not difficult at all          Today's PHQ-2 Score:   PHQ-2 ( 1999 Pf[employer]) 8/20/2021   Q1: Little interest or pleasure in doing things 0   Q2: Feeling down, depressed or hopeless 0   PHQ-2 Score 0   Q1: Little interest or pleasure in doing things Not at all   Q2: Feeling down, depressed or hopeless Not at all   PHQ-2 Score 0       Abuse: Current or Past(Physical, Sexual or Emotional)- No  Do you feel safe in your environment? Yes        Social History     Tobacco Use     Smoking status: Never Smoker     Smokeless tobacco: Never Used   Substance Use Topics     Alcohol use: Yes     Comment: weekends         Alcohol Use 8/20/2021   Prescreen: >3 drinks/day or >7 drinks/week? Yes   Prescreen: >3 drinks/day or >7 drinks/week? -   AUDIT SCORE  8       Last PSA:   PSA   Date Value Ref Range Status   08/14/2020 14.10 (H) 0 - 4 ug/L Final     Comment:     Assay Method:  Chemiluminescence using Siemens Vista analyzer       Past medical, family, and social histories, medications, and allergies are reviewed and updated in [facility].     Review of Systems   Constitutional: Negative for chills and fever.   HENT: Positive for hearing loss. Negative for congestion, ear pain and sore throat.    Eyes: Positive for visual disturbance. Negative for pain.   Respiratory: Negative for cough and shortness of breath.    Cardiovascular: Negative for chest pain, palpitations and peripheral edema.   Gastrointestinal: Negative for abdominal pain, constipation, diarrhea, heartburn, hematochezia and nausea.   Genitourinary: Positive for frequency, impotence and urgency. Negative for discharge, dysuria, genital sores and hematuria.   Musculoskeletal: Positive for  "arthralgias. Negative for joint swelling and myalgias.   Skin: Negative for rash.   Neurological: Positive for dizziness. Negative for weakness, headaches and paresthesias.   Psychiatric/Behavioral: Negative for mood changes. The patient is not nervous/anxious.          OBJECTIVE:   /81 (BP Location: Left arm, Patient Position: Chair, Cuff Size: Adult Regular)   Pulse 57   Temp 96.8  F (36  C) (Tympanic)   Ht 1.803 m (5' 11\")   Wt 97.5 kg (215 lb)   SpO2 96%   BMI 29.99 kg/m      Physical Exam  GENERAL: healthy, alert and no distress  EYES: Eyes grossly normal to inspection, PERRL and conjunctivae and sclerae normal  HENT: ear canals and TM's normal, nose and mouth without ulcers or lesions  NECK: no adenopathy, no asymmetry, masses, or scars and thyroid normal to palpation  RESP: lungs clear to auscultation - no rales, rhonchi or wheezes  CV: regular rate and rhythm, normal S1 S2, no S3 or S4, no murmur, click or rub, no peripheral edema and peripheral pulses strong  ABDOMEN: soft, nontender, no hepatosplenomegaly, no masses and bowel sounds normal. Diastasis recti noted.  MS: no gross musculoskeletal defects noted, no edema  SKIN: no suspicious lesions or rashes  NEURO: Normal strength and tone, mentation intact and speech normal  PSYCH: mentation appears normal, affect normal/bright        ASSESSMENT/PLAN:   (Z00.00) Routine general medical examination at a health care facility  (primary encounter diagnosis)  Comment: Negative screening exam; up-to-date on preventive services.   Plan: GLUCOSE        Follow-up in 1 year    (I25.10) Atherosclerosis of native coronary artery of native heart without angina pectoris  (E78.5) Hyperlipidemia LDL goal <100  Comment: Clinically stable.  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    Weight as of this encounter: 97.5 kg (215 lb).     Weight management plan: Discussed healthy diet and exercise guidelines as summarized in the AVS. Currentlyuses exercise bike 3+/wk and home weight machine at times.    He reports that he has never smoked. He has never used smokeless tobacco.      Counseling Resources:  ATP IV Guidelines  Pooled Cohorts Equation Calculator  FRAX Risk Assessment  ICSI Preventive Guidelines  Dietary Guidelines for Americans, 2010  USDA's MyPlate  ASA Prophylaxis  Lung CA Screening    Navin Euceda MD  [facility]  "

## 2021-08-26 NOTE — TELEPHONE ENCOUNTER
Routing refill request to provider for review/approval because:  Labs not current:  cr, lipids  Patient needs to be seen because it has been more than 1 year since last office visit.    Krysta GARCIAN, RN

## 2021-08-27 ENCOUNTER — OFFICE VISIT (OUTPATIENT)
Dept: FAMILY MEDICINE | Facility: CLINIC | Age: 66
End: 2021-08-27
Payer: COMMERCIAL

## 2021-08-27 VITALS
HEART RATE: 57 BPM | TEMPERATURE: 96.8 F | DIASTOLIC BLOOD PRESSURE: 81 MMHG | HEIGHT: 71 IN | BODY MASS INDEX: 30.1 KG/M2 | SYSTOLIC BLOOD PRESSURE: 126 MMHG | WEIGHT: 215 LBS | OXYGEN SATURATION: 96 %

## 2021-08-27 DIAGNOSIS — I25.10 ATHEROSCLEROSIS OF NATIVE CORONARY ARTERY OF NATIVE HEART WITHOUT ANGINA PECTORIS: ICD-10-CM

## 2021-08-27 DIAGNOSIS — Z00.00 ROUTINE GENERAL MEDICAL EXAMINATION AT A HEALTH CARE FACILITY: Primary | ICD-10-CM

## 2021-08-27 DIAGNOSIS — H93.13 TINNITUS, BILATERAL: ICD-10-CM

## 2021-08-27 DIAGNOSIS — M23.51 RECURRENT RIGHT KNEE INSTABILITY: ICD-10-CM

## 2021-08-27 DIAGNOSIS — M19.042 ARTHRITIS OF HAND, LEFT: ICD-10-CM

## 2021-08-27 DIAGNOSIS — E78.5 HYPERLIPIDEMIA LDL GOAL <100: ICD-10-CM

## 2021-08-27 DIAGNOSIS — L21.9 SEBORRHEA: ICD-10-CM

## 2021-08-27 LAB
ALT SERPL W P-5'-P-CCNC: 40 U/L (ref 0–70)
CHOLEST SERPL-MCNC: 169 MG/DL
CREAT SERPL-MCNC: 1.08 MG/DL (ref 0.66–1.25)
FASTING STATUS PATIENT QL REPORTED: YES
FASTING STATUS PATIENT QL REPORTED: YES
GFR SERPL CREATININE-BSD FRML MDRD: 72 ML/MIN/1.73M2
GLUCOSE BLD-MCNC: 106 MG/DL (ref 70–99)
HDLC SERPL-MCNC: 61 MG/DL
LDLC SERPL CALC-MCNC: 76 MG/DL
NONHDLC SERPL-MCNC: 108 MG/DL
TRIGL SERPL-MCNC: 162 MG/DL

## 2021-08-27 PROCEDURE — 99214 OFFICE O/P EST MOD 30 MIN: CPT | Mod: 25 | Performed by: FAMILY MEDICINE

## 2021-08-27 PROCEDURE — 36415 COLL VENOUS BLD VENIPUNCTURE: CPT | Performed by: FAMILY MEDICINE

## 2021-08-27 PROCEDURE — 84460 ALANINE AMINO (ALT) (SGPT): CPT | Performed by: FAMILY MEDICINE

## 2021-08-27 PROCEDURE — 80061 LIPID PANEL: CPT | Performed by: FAMILY MEDICINE

## 2021-08-27 PROCEDURE — 99396 PREV VISIT EST AGE 40-64: CPT | Performed by: FAMILY MEDICINE

## 2021-08-27 PROCEDURE — 82947 ASSAY GLUCOSE BLOOD QUANT: CPT | Performed by: FAMILY MEDICINE

## 2021-08-27 PROCEDURE — 82565 ASSAY OF CREATININE: CPT | Performed by: FAMILY MEDICINE

## 2021-08-27 RX ORDER — KETOCONAZOLE 20 MG/G
CREAM TOPICAL 2 TIMES DAILY PRN
Qty: 30 G | Refills: 1 | Status: SHIPPED | OUTPATIENT
Start: 2021-08-27 | End: 2021-10-30

## 2021-08-27 ASSESSMENT — ENCOUNTER SYMPTOMS
HEARTBURN: 0
DYSURIA: 0
NERVOUS/ANXIOUS: 0
JOINT SWELLING: 0
COUGH: 0
DIZZINESS: 1
WEAKNESS: 0
SHORTNESS OF BREATH: 0
HEADACHES: 0
HEMATOCHEZIA: 0
ARTHRALGIAS: 1
CHILLS: 0
CONSTIPATION: 0
NAUSEA: 0
FEVER: 0
MYALGIAS: 0
ABDOMINAL PAIN: 0
HEMATURIA: 0
DIARRHEA: 0
PARESTHESIAS: 0
FREQUENCY: 1
EYE PAIN: 0
SORE THROAT: 0
PALPITATIONS: 0

## 2021-08-27 ASSESSMENT — ANXIETY QUESTIONNAIRES
7. FEELING AFRAID AS IF SOMETHING AWFUL MIGHT HAPPEN: SEVERAL DAYS
2. NOT BEING ABLE TO STOP OR CONTROL WORRYING: NOT AT ALL
3. WORRYING TOO MUCH ABOUT DIFFERENT THINGS: SEVERAL DAYS
1. FEELING NERVOUS, ANXIOUS, OR ON EDGE: SEVERAL DAYS
IF YOU CHECKED OFF ANY PROBLEMS ON THIS QUESTIONNAIRE, HOW DIFFICULT HAVE THESE PROBLEMS MADE IT FOR YOU TO DO YOUR WORK, TAKE CARE OF THINGS AT HOME, OR GET ALONG WITH OTHER PEOPLE: NOT DIFFICULT AT ALL
GAD7 TOTAL SCORE: 3
5. BEING SO RESTLESS THAT IT IS HARD TO SIT STILL: NOT AT ALL
6. BECOMING EASILY ANNOYED OR IRRITABLE: NOT AT ALL

## 2021-08-27 ASSESSMENT — MIFFLIN-ST. JEOR: SCORE: 1787.36

## 2021-08-27 ASSESSMENT — PATIENT HEALTH QUESTIONNAIRE - PHQ9: 5. POOR APPETITE OR OVEREATING: NOT AT ALL

## 2021-08-27 ASSESSMENT — PAIN SCALES - GENERAL: PAINLEVEL: NO PAIN (0)

## 2021-08-28 ASSESSMENT — ANXIETY QUESTIONNAIRES: GAD7 TOTAL SCORE: 3

## 2021-08-30 DIAGNOSIS — M25.531 RIGHT WRIST PAIN: ICD-10-CM

## 2021-08-31 NOTE — TELEPHONE ENCOUNTER
"Requested Prescriptions   Pending Prescriptions Disp Refills     sulindac (CLINORIL) 150 MG tablet [Pharmacy Med Name: SULINDAC 150MG TABLETS] 180 tablet 0     Sig: TAKE 1 TABLET BY MOUTH TWICE DAILY AS NEEDED FOR WRIST PAIN. TAKE WITH FOOD       NSAID Medications Failed - 8/30/2021 10:17 AM        Failed - Normal AST on file in past 12 months     Recent Labs   Lab Test 08/14/20  0953   AST 27             Failed - Normal CBC on file in past 12 months     Recent Labs   Lab Test 01/28/17  1150   WBC 6.5   RBC 4.82   HGB 14.3   HCT 42.8                    Passed - Blood pressure under 140/90 in past 12 months     BP Readings from Last 3 Encounters:   08/27/21 126/81   05/07/21 113/72   11/02/20 (!) 146/82                 Passed - Normal ALT on file in past 12 months     Recent Labs   Lab Test 08/27/21  0813   ALT 40             Passed - Recent (12 mo) or future (30 days) visit within the authorizing provider's specialty     Patient has had an office visit with the authorizing provider or a provider within the authorizing providers department within the previous 12 mos or has a future within next 30 days. See \"Patient Info\" tab in inbasket, or \"Choose Columns\" in Meds & Orders section of the refill encounter.              Passed - Patient is age 6-64 years        Passed - Medication is active on med list        Passed - Normal serum creatinine on file in past 12 months     Recent Labs   Lab Test 08/27/21  0813   CR 1.08       Ok to refill medication if creatinine is low               Kelsy GARCIAN, RN    "

## 2021-09-02 ENCOUNTER — OFFICE VISIT (OUTPATIENT)
Dept: ORTHOPEDICS | Facility: OTHER | Age: 66
End: 2021-09-02
Payer: COMMERCIAL

## 2021-09-02 ENCOUNTER — ANCILLARY PROCEDURE (OUTPATIENT)
Dept: GENERAL RADIOLOGY | Facility: OTHER | Age: 66
End: 2021-09-02
Attending: ORTHOPAEDIC SURGERY
Payer: COMMERCIAL

## 2021-09-02 VITALS
BODY MASS INDEX: 30.1 KG/M2 | HEIGHT: 71 IN | SYSTOLIC BLOOD PRESSURE: 163 MMHG | WEIGHT: 215 LBS | RESPIRATION RATE: 16 BRPM | DIASTOLIC BLOOD PRESSURE: 92 MMHG

## 2021-09-02 DIAGNOSIS — M25.561 RIGHT KNEE PAIN, UNSPECIFIED CHRONICITY: ICD-10-CM

## 2021-09-02 DIAGNOSIS — M25.561 RIGHT KNEE PAIN, UNSPECIFIED CHRONICITY: Primary | ICD-10-CM

## 2021-09-02 DIAGNOSIS — M23.51 RECURRENT RIGHT KNEE INSTABILITY: ICD-10-CM

## 2021-09-02 PROCEDURE — 73562 X-RAY EXAM OF KNEE 3: CPT | Mod: RT | Performed by: RADIOLOGY

## 2021-09-02 PROCEDURE — 99203 OFFICE O/P NEW LOW 30 MIN: CPT | Performed by: ORTHOPAEDIC SURGERY

## 2021-09-02 ASSESSMENT — MIFFLIN-ST. JEOR: SCORE: 1787.36

## 2021-09-02 NOTE — LETTER
9/2/2021         RE: Grey Markham  [address]  [place] [address]        Dear Colleague,    Thank you for referring your patient, Grey Markham, to the [facility] [place]. Please see a copy of my visit note below.    Grey Markham is a 65 year old male who is seen in consultation at the request of Dr. Navin Euceda  for right knee pain.  He has had a feeling of instability at times in the last few months.  He feels this most going up and down stairs and sometimes pivoting.  He did have right knee arthroscopy in 2008 for partial medial meniscectomy and plica excision.  He has not had problems over the years with the knee.  He does not recall a new history of injury.  He has occasional fleeting pain, but nothing on a regular basis.    X-ray is unremarkable.  No significant osteoarthritis.    Past Medical History:   Diagnosis Date     Coronary atherosclerosis of native coronary artery 12/30/09    10%  mid-LAD lesion, Dr. Mendoza     DDD (degenerative disc disease), lumbar     L-4-L5, L5-S1     GERD (gastroesophageal reflux disease) 11/2007    no Rose's     Glaucoma      Glaucoma      Hyperlipidemia LDL goal <100     hyper-TG, on Simv 80mg since at least 1/4/10     Oral herpes        Past Surgical History:   Procedure Laterality Date     ARTHROSCOPY KNEE RT/LT Right 02/22/2008    MMT, Dr. Shaista HADDAD GLAUCOMA SURG,IRIDENCLEISIS  04/01/2003    LASER     C GLAUCOMA SURG,IRIDENCLEISIS  06/01/2006    laser     CARDIAC CATHERIZATION  12/30/2009    10% stenosis of mid LAD, EF 70%, nl otherwise Dr. Elliot Mendoza, [facility]     CATARACT IOL, RT/LT Right 05/26/2016     CATARACT IOL, RT/LT Left 06/2016     COLONOSCOPY WITH CO2 INSUFFLATION N/A 07/09/2018    Procedure: COLONOSCOPY WITH CO2 INSUFFLATION;  Screen for colon cancer;  Surgeon: Navin Vasquez DO;  Location: [facility] OR     COMBINED ESOPHAGOSCOPY, GASTROSCOPY, DUODENOSCOPY (EGD) WITH CO2 INSUFFLATION N/A 06/14/2017     Procedure: COMBINED ESOPHAGOSCOPY, GASTROSCOPY, DUODENOSCOPY (EGD) WITH CO2 INSUFFLATION;  EGD-GASTROESOPHAGEAL REFLUX DISEASE WITHOUT ESOPHAGITIS/ DARNELL;  Surgeon: Navin Vasquez DO;  Location: [facility] OR     ESOPHAGOSCOPY, GASTROSCOPY, DUODENOSCOPY (EGD), COMBINED N/A 2017    Procedure: COMBINED ESOPHAGOSCOPY, GASTROSCOPY, DUODENOSCOPY (EGD), BIOPSY SINGLE OR MULTIPLE;;  Surgeon: Navin Vasquez DO;  Location: [facility] OR     GLAUCOMA SURGERY      ahmed valve x 1 le x 2 re     GLAUCOMA SURGERY Right 2015    trabeculectomy with [facility]     HERNIA REPAIR, UMBILICAL  age 6     LASER SURGERY OF EYE      6-7 lasers [facility] glaucoma     LASER SURGERY OF EYE Right 2015    [facility]     PROSTATE BIOPSY, NEEDLE, SATURATION SAMPLING  2014    Dr. Zaragoza       Family History   Problem Relation Age of Onset     Glaucoma Mother 87     Cerebrovascular Disease Mother      Snoring Mother      Lymphoma Father          age 73     Glaucoma Father      Cancer Maternal Grandmother      Liver Cancer Other         aunt     Diabetes Other         mother's relatives     C.A.D. No family hx of      Macular Degeneration No family hx of      Hypertension No family hx of      Thyroid Disease No family hx of      Anesthesia Reaction No family hx of      Bleeding Disorder No family hx of      Thrombophilia No family hx of        Social History     Socioeconomic History     Marital status: Single     Spouse name: Not on file     Number of children: 0     Years of education: 14     Highest education level: Not on file   Occupational History     Not on file   Tobacco Use     Smoking status: Never Smoker     Smokeless tobacco: Never Used   Substance and Sexual Activity     Alcohol use: Yes     Comment: weekends     Drug use: No     Sexual activity: Yes     Partners: Female   Other Topics Concern     Parent/sibling w/ CABG, MI or angioplasty before 65F 55M? Not Asked      Service No     Blood Transfusions No     Caffeine Concern No      Occupational Exposure No     Hobby Hazards No     Sleep Concern No     Stress Concern Yes     Comment: anxiety     Weight Concern Yes     Comment: patient is concerned about weight gain     Special Diet No     Back Care Yes     Exercise Yes     Comment: regularly     Bike Helmet No     Seat Belt Yes     Self-Exams Yes     Comment: occasionally   Social History Narrative    ** Merged History Encounter **          Social Determinants of Health     Financial Resource Strain:      Difficulty of Paying Living Expenses:    Food Insecurity:      Worried About Running Out of Food in the Last Year:      Ran Out of Food in the Last Year:    Transportation Needs:      Lack of Transportation (Medical):      Lack of Transportation (Non-Medical):    Physical Activity:      Days of Exercise per Week:      Minutes of Exercise per Session:    Stress:      Feeling of Stress :    Social Connections:      Frequency of Communication with Friends and Family:      Frequency of Social Gatherings with Friends and Family:      Attends Mormon Services:      Active Member of Clubs or Organizations:      Attends Club or Organization Meetings:      Marital Status:    Intimate Partner Violence:      Fear of Current or Ex-Partner:      Emotionally Abused:      Physically Abused:      Sexually Abused:        Current Outpatient Medications   Medication Sig Dispense Refill     alfuzosin ER (UROXATRAL) 10 MG 24 hr tablet Take 1 tablet (10 mg) by mouth daily 90 tablet 3     aspirin 81 MG tablet Take 1 tablet by mouth daily. * 100 tablet prn     atorvastatin (LIPITOR) 80 MG tablet Take 1 tablet (80 mg) by mouth daily for cholesterol. 90 tablet 3     finasteride (PROSCAR) 5 MG tablet Take 1 tablet (5 mg) by mouth daily 90 tablet 3     FLUoxetine 20 MG tablet Take 1 tablet (20 mg) by mouth daily for anxiety prevention. 90 tablet 3     ketoconazole (NIZORAL) 2 % external cream Apply topically 2 times daily as needed (to seborrhea of face) 30 g 1      "pantoprazole (PROTONIX) 40 MG EC tablet TAKE 1 TABLET BY MOUTH ONE TIME DAILY FOR REFLUX 90 tablet 3     sildenafil (REVATIO) 20 MG tablet TAKE 1-5 TABS BY MOUTH DAILY AS NEEDED FOR ED. TAKE 1-3 HRS BE4 INTERCOURSE. MAX 1 DOSE/24HRS 30 tablet 4     sulindac (CLINORIL) 150 MG tablet TAKE 1 TABLET (150 MG) BY MOUTH 2 TIMES DAILY AS NEEDED FOR WRIST PAIN. TAKE WITH FOOD. 180 tablet 0     triamcinolone (KENALOG) 0.1 % external ointment Apply topically 2 times daily 15 g 0     valACYclovir (VALTREX) 500 MG tablet TAKE ONE TABLET BY MOUTH EVERY TWELVE HOURS FOR 5 DAYS. START AT EARLIEST ONSET OF SYMPTOMS 20 tablet 1       Allergies   Allergen Reactions     Gemfibrozil Diarrhea     No Clinical Screening - See Comments      All glaucoma drops except travatan     Pilocarpine Hydrochloride      Eye drops     Timoptic [Timolol Maleate]      Eye drops only       REVIEW OF SYSTEMS:  CONSTITUTIONAL:  NEGATIVE for fever, chills, change in weight, not feeling tired  SKIN:  NEGATIVE for worrisome rashes, no skin lumps, no skin ulcers and no non-healing wounds  EYES:  NEGATIVE for vision changes or irritation.  ENT/MOUTH:  NEGATIVE.  No hearing loss, no hoarseness, no difficulty swallowing.  RESP:  NEGATIVE. No cough or shortness of breath.  CV:  NEGATIVE for chest pain, palpitations or peripheral edema  GI:  NEGATIVE for nausea, abdominal pain, heartburn, or change in bowel habits  :  Negative. No dysuria, no hematuria  MUSCULOSKELETAL:  See HPI above  NEURO:  NEGATIVE . No headaches, no dizziness,  no numbness  ENDOCRINE:  NEGATIVE for temperature intolerance, skin/hair changes  HEME/ALLERGY/IMMUNE:  NEGATIVE for bleeding problems  PSYCHIATRIC:  NEGATIVE. no anxiety, no depression.      Exam:  Vitals: BP (!) 163/92   Resp 16   Ht 1.803 m (5' 11\")   Wt 97.5 kg (215 lb)   BMI 29.99 kg/m    BMI= Body mass index is 29.99 kg/m .  Constitutional:  healthy, alert and no distress  Neuro: Alert and Oriented x 3, Sensation grossly " WNL.  HEENT:  Atraumatic, EOMI  Neck:  Neck supple with no tenderness.  Psych: Affect normal   Respiratory: Breathing not labored.  Cardiovascular: normal peripheral pulses  Lymph: no adenopathy  Skin: No rashes,worrisome lesions or skin problems  Spine: straight, no straight leg raising pain.  Hips show full range of motion.  There is no tenderness over the sacro-iliac joints, sciatic notch, or greater trochanters.   Is full motion of the knees from 0 to 125 degrees.  He had no medial or lateral joint line tenderness.  He had no pain with patellar apprehension or patellar grind.  There is no instability of MCL, LCL, or cruciates.  He had negative medial and lateral Blue.  No effusions.  Sensation, motor and circulation are intact.    Assessment:  Right knee instability of undetermined cause.  I can find no structural damage at this time.  We will have him work on strengthening, and observe circumstances of any instability.  It may be that he is having spinal stenosis leading to instability of the knee.  I have given him things to watch for to indicate this.      Again, thank you for allowing me to participate in the care of your patient.        Sincerely,        Randolph Noonan MD

## 2021-09-03 NOTE — PROGRESS NOTES
Grey Markham is a 65 year old male who is seen in consultation at the request of Dr. Navin Euceda  for right knee pain.  He has had a feeling of instability at times in the last few months.  He feels this most going up and down stairs and sometimes pivoting.  He did have right knee arthroscopy in 2008 for partial medial meniscectomy and plica excision.  He has not had problems over the years with the knee.  He does not recall a new history of injury.  He has occasional fleeting pain, but nothing on a regular basis.    X-ray is unremarkable.  No significant osteoarthritis.    Past Medical History:   Diagnosis Date     Coronary atherosclerosis of native coronary artery 12/30/09    10%  mid-LAD lesion, Dr. Mendoza     DDD (degenerative disc disease), lumbar     L-4-L5, L5-S1     GERD (gastroesophageal reflux disease) 11/2007    no Rose's     Glaucoma      Glaucoma      Hyperlipidemia LDL goal <100     hyper-TG, on Simv 80mg since at least 1/4/10     Oral herpes        Past Surgical History:   Procedure Laterality Date     ARTHROSCOPY KNEE RT/LT Right 02/22/2008    MMT, Dr. Noonan     C GLAUCOMA SURG,IRIDENCLEISIS  04/01/2003    LASER     C GLAUCOMA SURG,IRIDENCLEISIS  06/01/2006    laser     CARDIAC CATHERIZATION  12/30/2009    10% stenosis of mid LAD, EF 70%, nl otherwise Dr. Elliot Mendoza, [facility]     CATARACT IOL, RT/LT Right 05/26/2016     CATARACT IOL, RT/LT Left 06/2016     COLONOSCOPY WITH CO2 INSUFFLATION N/A 07/09/2018    Procedure: COLONOSCOPY WITH CO2 INSUFFLATION;  Screen for colon cancer;  Surgeon: Navin Vasquez DO;  Location: [facility]     COMBINED ESOPHAGOSCOPY, GASTROSCOPY, DUODENOSCOPY (EGD) WITH CO2 INSUFFLATION N/A 06/14/2017    Procedure: COMBINED ESOPHAGOSCOPY, GASTROSCOPY, DUODENOSCOPY (EGD) WITH CO2 INSUFFLATION;  EGD-GASTROESOPHAGEAL REFLUX DISEASE WITHOUT ESOPHAGITIS/ EUCEDA;  Surgeon: Navin Vasquez DO;  Location: [facility] OR     ESOPHAGOSCOPY, GASTROSCOPY, DUODENOSCOPY (EGD), COMBINED  N/A 2017    Procedure: COMBINED ESOPHAGOSCOPY, GASTROSCOPY, DUODENOSCOPY (EGD), BIOPSY SINGLE OR MULTIPLE;;  Surgeon: Navin Vasquez DO;  Location: [facility] OR     GLAUCOMA SURGERY      ahmed valve x 1 le x 2 re     GLAUCOMA SURGERY Right 2015    trabeculectomy with [facility]     HERNIA REPAIR, UMBILICAL  age 6     LASER SURGERY OF EYE      6-7 lasers [facility] glaucoma     LASER SURGERY OF EYE Right 2015    [facility]     PROSTATE BIOPSY, NEEDLE, SATURATION SAMPLING  2014    Dr. Zaragoza       Family History   Problem Relation Age of Onset     Glaucoma Mother 87     Cerebrovascular Disease Mother      Snoring Mother      Lymphoma Father          age 73     Glaucoma Father      Cancer Maternal Grandmother      Liver Cancer Other         aunt     Diabetes Other         mother's relatives     C.A.D. No family hx of      Macular Degeneration No family hx of      Hypertension No family hx of      Thyroid Disease No family hx of      Anesthesia Reaction No family hx of      Bleeding Disorder No family hx of      Thrombophilia No family hx of        Social History     Socioeconomic History     Marital status: Single     Spouse name: Not on file     Number of children: 0     Years of education: 14     Highest education level: Not on file   Occupational History     Not on file   Tobacco Use     Smoking status: Never Smoker     Smokeless tobacco: Never Used   Substance and Sexual Activity     Alcohol use: Yes     Comment: weekends     Drug use: No     Sexual activity: Yes     Partners: Female   Other Topics Concern     Parent/sibling w/ CABG, MI or angioplasty before 65F 55M? Not Asked      Service No     Blood Transfusions No     Caffeine Concern No     Occupational Exposure No     Hobby Hazards No     Sleep Concern No     Stress Concern Yes     Comment: anxiety     Weight Concern Yes     Comment: patient is concerned about weight gain     Special Diet No     Back Care Yes     Exercise Yes     Comment:  regularly     Bike Helmet No     Seat Belt Yes     Self-Exams Yes     Comment: occasionally   Social History Narrative    ** Merged History Encounter **          Social Determinants of Health     Financial Resource Strain:      Difficulty of Paying Living Expenses:    Food Insecurity:      Worried About Running Out of Food in the Last Year:      Ran Out of Food in the Last Year:    Transportation Needs:      Lack of Transportation (Medical):      Lack of Transportation (Non-Medical):    Physical Activity:      Days of Exercise per Week:      Minutes of Exercise per Session:    Stress:      Feeling of Stress :    Social Connections:      Frequency of Communication with Friends and Family:      Frequency of Social Gatherings with Friends and Family:      Attends Mandaeism Services:      Active Member of Clubs or Organizations:      Attends Club or Organization Meetings:      Marital Status:    Intimate Partner Violence:      Fear of Current or Ex-Partner:      Emotionally Abused:      Physically Abused:      Sexually Abused:        Current Outpatient Medications   Medication Sig Dispense Refill     alfuzosin ER (UROXATRAL) 10 MG 24 hr tablet Take 1 tablet (10 mg) by mouth daily 90 tablet 3     aspirin 81 MG tablet Take 1 tablet by mouth daily. * 100 tablet prn     atorvastatin (LIPITOR) 80 MG tablet Take 1 tablet (80 mg) by mouth daily for cholesterol. 90 tablet 3     finasteride (PROSCAR) 5 MG tablet Take 1 tablet (5 mg) by mouth daily 90 tablet 3     FLUoxetine 20 MG tablet Take 1 tablet (20 mg) by mouth daily for anxiety prevention. 90 tablet 3     ketoconazole (NIZORAL) 2 % external cream Apply topically 2 times daily as needed (to seborrhea of face) 30 g 1     pantoprazole (PROTONIX) 40 MG EC tablet TAKE 1 TABLET BY MOUTH ONE TIME DAILY FOR REFLUX 90 tablet 3     sildenafil (REVATIO) 20 MG tablet TAKE 1-5 TABS BY MOUTH DAILY AS NEEDED FOR ED. TAKE 1-3 HRS BE4 INTERCOURSE. MAX 1 DOSE/24HRS 30 tablet 4     sulindac  "(CLINORIL) 150 MG tablet TAKE 1 TABLET (150 MG) BY MOUTH 2 TIMES DAILY AS NEEDED FOR WRIST PAIN. TAKE WITH FOOD. 180 tablet 0     triamcinolone (KENALOG) 0.1 % external ointment Apply topically 2 times daily 15 g 0     valACYclovir (VALTREX) 500 MG tablet TAKE ONE TABLET BY MOUTH EVERY TWELVE HOURS FOR 5 DAYS. START AT EARLIEST ONSET OF SYMPTOMS 20 tablet 1       Allergies   Allergen Reactions     Gemfibrozil Diarrhea     No Clinical Screening - See Comments      All glaucoma drops except travatan     Pilocarpine Hydrochloride      Eye drops     Timoptic [Timolol Maleate]      Eye drops only       REVIEW OF SYSTEMS:  CONSTITUTIONAL:  NEGATIVE for fever, chills, change in weight, not feeling tired  SKIN:  NEGATIVE for worrisome rashes, no skin lumps, no skin ulcers and no non-healing wounds  EYES:  NEGATIVE for vision changes or irritation.  ENT/MOUTH:  NEGATIVE.  No hearing loss, no hoarseness, no difficulty swallowing.  RESP:  NEGATIVE. No cough or shortness of breath.  CV:  NEGATIVE for chest pain, palpitations or peripheral edema  GI:  NEGATIVE for nausea, abdominal pain, heartburn, or change in bowel habits  :  Negative. No dysuria, no hematuria  MUSCULOSKELETAL:  See HPI above  NEURO:  NEGATIVE . No headaches, no dizziness,  no numbness  ENDOCRINE:  NEGATIVE for temperature intolerance, skin/hair changes  HEME/ALLERGY/IMMUNE:  NEGATIVE for bleeding problems  PSYCHIATRIC:  NEGATIVE. no anxiety, no depression.      Exam:  Vitals: BP (!) 163/92   Resp 16   Ht 1.803 m (5' 11\")   Wt 97.5 kg (215 lb)   BMI 29.99 kg/m    BMI= Body mass index is 29.99 kg/m .  Constitutional:  healthy, alert and no distress  Neuro: Alert and Oriented x 3, Sensation grossly WNL.  HEENT:  Atraumatic, EOMI  Neck:  Neck supple with no tenderness.  Psych: Affect normal   Respiratory: Breathing not labored.  Cardiovascular: normal peripheral pulses  Lymph: no adenopathy  Skin: No rashes,worrisome lesions or skin problems  Spine: " straight, no straight leg raising pain.  Hips show full range of motion.  There is no tenderness over the sacro-iliac joints, sciatic notch, or greater trochanters.   Is full motion of the knees from 0 to 125 degrees.  He had no medial or lateral joint line tenderness.  He had no pain with patellar apprehension or patellar grind.  There is no instability of MCL, LCL, or cruciates.  He had negative medial and lateral Blue.  No effusions.  Sensation, motor and circulation are intact.    Assessment:  Right knee instability of undetermined cause.  I can find no structural damage at this time.  We will have him work on strengthening, and observe circumstances of any instability.  It may be that he is having spinal stenosis leading to instability of the knee.  I have given him things to watch for to indicate this.

## 2021-09-05 RX ORDER — SULINDAC 150 MG/1
TABLET ORAL
Qty: 180 TABLET | Refills: 0 | Status: SHIPPED | OUTPATIENT
Start: 2021-09-05 | End: 2022-10-24

## 2021-09-20 ENCOUNTER — MYC MEDICAL ADVICE (OUTPATIENT)
Dept: FAMILY MEDICINE | Facility: CLINIC | Age: 66
End: 2021-09-20

## 2021-09-20 DIAGNOSIS — K21.9 GASTROESOPHAGEAL REFLUX DISEASE WITHOUT ESOPHAGITIS: Primary | ICD-10-CM

## 2021-09-21 ENCOUNTER — TELEPHONE (OUTPATIENT)
Dept: FAMILY MEDICINE | Facility: CLINIC | Age: 66
End: 2021-09-21
Payer: MEDICAID

## 2021-09-21 NOTE — TELEPHONE ENCOUNTER
Plan does not cover pantoprazole (PROTONIX) 40 MG EC tablet.  Please call [phone] to initiate Prior Auth or change med.    Insurance type and ID number: ID# [id number]      Additional Information:     Ángela Vicente

## 2021-10-02 DIAGNOSIS — B00.2 HERPETIC GINGIVOSTOMATITIS: ICD-10-CM

## 2021-10-04 RX ORDER — VALACYCLOVIR HYDROCHLORIDE 500 MG/1
TABLET, FILM COATED ORAL
Qty: 20 TABLET | Refills: 1 | Status: SHIPPED | OUTPATIENT
Start: 2021-10-04 | End: 2022-08-18

## 2021-10-04 NOTE — TELEPHONE ENCOUNTER
Prescription approved per [facility] Refill Protocol.  Liz Bridges RN, BSN   [facility] [place]

## 2021-10-05 ENCOUNTER — TELEPHONE (OUTPATIENT)
Dept: FAMILY MEDICINE | Facility: CLINIC | Age: 66
End: 2021-10-05
Payer: MEDICAID

## 2021-10-05 RX ORDER — LANSOPRAZOLE 30 MG/1
30 CAPSULE, DELAYED RELEASE ORAL DAILY
Qty: 90 CAPSULE | Refills: 0 | Status: SHIPPED | OUTPATIENT
Start: 2021-10-05 | End: 2022-10-24

## 2021-10-05 RX ORDER — OMEPRAZOLE 40 MG/1
40 CAPSULE, DELAYED RELEASE ORAL DAILY
Qty: 90 CAPSULE | Refills: 0 | Status: SHIPPED | OUTPATIENT
Start: 2021-10-05 | End: 2022-10-24

## 2021-10-05 RX ORDER — ESOMEPRAZOLE MAGNESIUM 40 MG/1
40 CAPSULE, DELAYED RELEASE ORAL DAILY
Qty: 90 CAPSULE | Refills: 0 | Status: SHIPPED | OUTPATIENT
Start: 2021-10-05 | End: 2022-10-24

## 2021-10-05 NOTE — TELEPHONE ENCOUNTER
Plan does not cover LANsoprazole (PREVACID) 30 MG DR capsule.  Please call [phone] to initiate Prior Auth or change med.    Insurance type and ID number: ID# [id number]      Additional Information:     Ángela Vicente

## 2021-10-05 NOTE — TELEPHONE ENCOUNTER
Plan does not cover omeprazole (PRILOSEC) 40 MG DR capsule.  Please call [phone] to initiate Prior Auth or change med.    Insurance type and ID number: ID# [id number]      Additional Information:     Ángela Vicente

## 2021-10-05 NOTE — TELEPHONE ENCOUNTER
Plan does not cover esomeprazole (NEXIUM) 40 MG DR capsule.  Please call [phone] to initiate Prior Auth or change med.    Insurance type and ID number: ID# [id number]      Additional Information:     Ángela Vicente

## 2021-10-07 DIAGNOSIS — L21.9 SEBORRHEA: ICD-10-CM

## 2021-10-11 DIAGNOSIS — N52.03 COMBINED ARTERIAL INSUFFICIENCY AND CORPORO-VENOUS OCCLUSIVE ERECTILE DYSFUNCTION: ICD-10-CM

## 2021-10-13 RX ORDER — SILDENAFIL CITRATE 20 MG/1
TABLET ORAL
Qty: 30 TABLET | Refills: 4 | Status: SHIPPED | OUTPATIENT
Start: 2021-10-13 | End: 2022-06-07

## 2021-10-13 NOTE — TELEPHONE ENCOUNTER
"Routing refill request to provider for review/approval because:  Requested Prescriptions   Pending Prescriptions Disp Refills    sildenafil (REVATIO) 20 MG tablet [Pharmacy Med Name: SILDENAFIL 20 MG TABLET] 30 tablet 4     Sig: TAKE 1-5 TABS BY MOUTH DAILY AS NEEDED FOR ED. TAKE 1-3 HRS BE4 INTERCOURSE. MAX 1 DOSE/24HRS        Erectile Dysfuction Protocol Failed - 10/11/2021  5:30 PM        Failed - Absence of Alpha Blockers on Med list        Passed - Absence of nitrates on medication list        Passed - Recent (12 mo) or future (30 days) visit within the authorizing provider's specialty     Patient has had an office visit with the authorizing provider or a provider within the authorizing providers department within the previous 12 mos or has a future within next 30 days. See \"Patient Info\" tab in inbasket, or \"Choose Columns\" in Meds & Orders section of the refill encounter.              Passed - Medication is active on med list        Passed - Patient is age 18 or older                Pao STUBBS, RN        "

## 2021-10-14 ENCOUNTER — MYC MEDICAL ADVICE (OUTPATIENT)
Dept: FAMILY MEDICINE | Facility: CLINIC | Age: 66
End: 2021-10-14

## 2021-10-19 DIAGNOSIS — L21.9 SEBORRHEA: ICD-10-CM

## 2021-10-19 RX ORDER — KETOCONAZOLE 20 MG/G
CREAM TOPICAL
Qty: 30 G | Refills: 1 | OUTPATIENT
Start: 2021-10-19

## 2021-10-20 NOTE — TELEPHONE ENCOUNTER
Routing refill request to provider for review/approval because:  Drug interaction warning    Kelsy Charles BSN, RN

## 2021-10-23 ENCOUNTER — HEALTH MAINTENANCE LETTER (OUTPATIENT)
Age: 66
End: 2021-10-23

## 2021-10-26 ENCOUNTER — MYC MEDICAL ADVICE (OUTPATIENT)
Dept: FAMILY MEDICINE | Facility: CLINIC | Age: 66
End: 2021-10-26
Payer: MEDICAID

## 2021-10-26 ENCOUNTER — TELEPHONE (OUTPATIENT)
Dept: FAMILY MEDICINE | Facility: CLINIC | Age: 66
End: 2021-10-26
Payer: MEDICAID

## 2021-10-26 NOTE — TELEPHONE ENCOUNTER
, please determine if a form was sent and completed, if not request a new form from provider and have this completed and signed.  Alondra Tariq RN

## 2021-10-26 NOTE — TELEPHONE ENCOUNTER
Plan does not cover pantoprazole (PROTONIX) 40 MG EC tablet.  Please call [phone] to initiate Prior Auth or change med.    Insurance type and ID number: ID# [id number]      Additional Information: Multiple PA requests in patients chart.  This one is also in on 09/21/21?    Ángela Vicente

## 2021-10-29 NOTE — TELEPHONE ENCOUNTER
Central Prior Authorization Team   Phone: [phone]      PA Initiation    Medication: omeprazole (PRILOSEC) 40 MG DR capsule-Initiated  Insurance Company: [employer] - Phone [phone] Fax [phone]  Pharmacy Filling the Rx: [id number] - [place], [place] - [address] AT [facility] [address]  Filling Pharmacy Phone: [phone]  Filling Pharmacy Fax:    Start Date: 10/29/2021

## 2021-10-29 NOTE — TELEPHONE ENCOUNTER
PRIOR AUTHORIZATION DENIED    Medication: omeprazole (PRILOSEC) 40 MG DR capsule-DENIED    Denial Date: 10/29/2021    Denial Rational: Requested quantity is larger/greater than allowed by insurance.  All PPIs are covered up to 1 per day for 120 days within 365 days.  If an appeal is desired please let the PA team know when a letter of medical necessity has been written explaining why the patient needs more than the 120 per 365.          Appeal Information:

## 2021-10-30 RX ORDER — KETOCONAZOLE 20 MG/G
CREAM TOPICAL
Qty: 30 G | Refills: 1 | Status: SHIPPED | OUTPATIENT
Start: 2021-10-30

## 2021-12-30 ENCOUNTER — TELEPHONE (OUTPATIENT)
Dept: UROLOGY | Facility: CLINIC | Age: 66
End: 2021-12-30
Payer: MEDICAID

## 2021-12-30 DIAGNOSIS — N40.1 BENIGN PROSTATIC HYPERPLASIA WITH LOWER URINARY TRACT SYMPTOMS, SYMPTOM DETAILS UNSPECIFIED: ICD-10-CM

## 2021-12-30 RX ORDER — FINASTERIDE 5 MG/1
5 TABLET, FILM COATED ORAL DAILY
Qty: 90 TABLET | Refills: 1 | Status: SHIPPED | OUTPATIENT
Start: 2021-12-30 | End: 2023-05-11

## 2021-12-30 NOTE — TELEPHONE ENCOUNTER
Refilled Finasteride 5mg. 90 with 1 refills. Last OV= 5/7/21.    Yaneli JACK RN Urology 12/30/2021 11:57 AM

## 2022-02-27 DIAGNOSIS — K21.9 GASTROESOPHAGEAL REFLUX DISEASE WITHOUT ESOPHAGITIS: ICD-10-CM

## 2022-03-01 NOTE — TELEPHONE ENCOUNTER
Routing refill request to provider for review/approval because:  Drug interaction warning  Marilia Johnson RN

## 2022-03-07 ENCOUNTER — MYC MEDICAL ADVICE (OUTPATIENT)
Dept: FAMILY MEDICINE | Facility: CLINIC | Age: 67
End: 2022-03-07
Payer: MEDICAID

## 2022-03-08 NOTE — TELEPHONE ENCOUNTER
Fluozetine and protonix is not covered by pt's insurance.    Is requesting for alternatives.      Routing to provider.      Amy Raymond RN  [facility]

## 2022-03-10 RX ORDER — PANTOPRAZOLE SODIUM 40 MG/1
TABLET, DELAYED RELEASE ORAL
Qty: 90 TABLET | Refills: 3 | Status: SHIPPED | OUTPATIENT
Start: 2022-03-10 | End: 2022-10-24

## 2022-04-18 ENCOUNTER — TELEPHONE (OUTPATIENT)
Dept: UROLOGY | Facility: CLINIC | Age: 67
End: 2022-04-18
Payer: MEDICAID

## 2022-04-18 ENCOUNTER — MYC MEDICAL ADVICE (OUTPATIENT)
Dept: UROLOGY | Facility: CLINIC | Age: 67
End: 2022-04-18
Payer: MEDICAID

## 2022-04-18 DIAGNOSIS — N40.1 BENIGN PROSTATIC HYPERPLASIA WITH LOWER URINARY TRACT SYMPTOMS, SYMPTOM DETAILS UNSPECIFIED: ICD-10-CM

## 2022-04-18 NOTE — TELEPHONE ENCOUNTER
Prior Authorization Retail Medication Request    Medication/Dose: Alfuzosin 10 mg   ICD code (if different than what is on RX):  JEWELS  Previously Tried and Failed: Cardura,   Rationale:  Continued BPH has had good results on this medication since 5/7/2021.    [id number]     Payor: -[employer] Ph: [phone]     Benefit plan: [facility]-[employer] [facility] Ph: [phone]     Group number: [id number]            Pharmacy Information (if different than what is on RX)  Name:  [facility]  Phone:  jewels

## 2022-04-20 RX ORDER — ALFUZOSIN HYDROCHLORIDE 10 MG/1
10 TABLET, EXTENDED RELEASE ORAL DAILY
Qty: 90 TABLET | Refills: 0 | Status: SHIPPED | OUTPATIENT
Start: 2022-04-20 | End: 2022-10-24

## 2022-04-20 NOTE — TELEPHONE ENCOUNTER
ONE TIME [facility] Refill Alfuzosin. 90 with 0 refills. Last OV= 05/7/21.    Yaneli JACK RN Urology 4/20/2022 1:33 PM

## 2022-06-03 DIAGNOSIS — N52.03 COMBINED ARTERIAL INSUFFICIENCY AND CORPORO-VENOUS OCCLUSIVE ERECTILE DYSFUNCTION: ICD-10-CM

## 2022-06-06 NOTE — TELEPHONE ENCOUNTER
Routing refill request to provider for review/approval because:  Erectile Dysfuction Protocol Failed 06/03/2022 01:27 PM   Protocol Details  Absence of Alpha Blockers on Med list         Amy Raymond RN  [facility]

## 2022-06-07 RX ORDER — SILDENAFIL CITRATE 20 MG/1
TABLET ORAL
Qty: 30 TABLET | Refills: 4 | Status: SHIPPED | OUTPATIENT
Start: 2022-06-07 | End: 2022-10-24

## 2022-08-16 DIAGNOSIS — B00.2 HERPETIC GINGIVOSTOMATITIS: ICD-10-CM

## 2022-08-16 DIAGNOSIS — F41.9 ANXIETY: ICD-10-CM

## 2022-08-17 ENCOUNTER — MYC MEDICAL ADVICE (OUTPATIENT)
Dept: FAMILY MEDICINE | Facility: CLINIC | Age: 67
End: 2022-08-17

## 2022-08-18 RX ORDER — FLUOXETINE 20 MG/1
20 TABLET, FILM COATED ORAL DAILY
Qty: 30 TABLET | Refills: 0 | Status: SHIPPED | OUTPATIENT
Start: 2022-08-18 | End: 2022-09-22

## 2022-08-18 RX ORDER — VALACYCLOVIR HYDROCHLORIDE 500 MG/1
TABLET, FILM COATED ORAL
Qty: 20 TABLET | Refills: 0 | Status: SHIPPED | OUTPATIENT
Start: 2022-08-18 | End: 2022-10-24

## 2022-08-19 DIAGNOSIS — I25.10 ATHEROSCLEROSIS OF NATIVE CORONARY ARTERY OF NATIVE HEART WITHOUT ANGINA PECTORIS: ICD-10-CM

## 2022-08-19 DIAGNOSIS — E78.5 HYPERLIPIDEMIA LDL GOAL <100: ICD-10-CM

## 2022-08-19 NOTE — TELEPHONE ENCOUNTER
Called patient to inform him of refills and the need for a follow up visit. Left voicemail detailing above and stated to call the clinic back or schedule via [facility].    Ladan Leonard, Visit Facilitator

## 2022-08-22 RX ORDER — ATORVASTATIN CALCIUM 80 MG/1
TABLET, FILM COATED ORAL
Qty: 90 TABLET | Refills: 0 | Status: SHIPPED | OUTPATIENT
Start: 2022-08-22 | End: 2022-10-24

## 2022-08-22 NOTE — TELEPHONE ENCOUNTER
Prescription approved per [facility] Refill Protocol.    Carlotta Bourgeois, RN, BSN

## 2022-09-20 DIAGNOSIS — F41.9 ANXIETY: ICD-10-CM

## 2022-09-22 RX ORDER — FLUOXETINE 20 MG/1
20 TABLET, FILM COATED ORAL DAILY
Qty: 30 TABLET | Refills: 0 | Status: SHIPPED | OUTPATIENT
Start: 2022-09-22 | End: 2022-10-24

## 2022-10-09 ENCOUNTER — HEALTH MAINTENANCE LETTER (OUTPATIENT)
Age: 67
End: 2022-10-09

## 2022-10-10 ENCOUNTER — MYC MEDICAL ADVICE (OUTPATIENT)
Dept: FAMILY MEDICINE | Facility: CLINIC | Age: 67
End: 2022-10-10

## 2022-10-24 ENCOUNTER — OFFICE VISIT (OUTPATIENT)
Dept: FAMILY MEDICINE | Facility: CLINIC | Age: 67
End: 2022-10-24
Payer: COMMERCIAL

## 2022-10-24 VITALS
TEMPERATURE: 97.9 F | DIASTOLIC BLOOD PRESSURE: 80 MMHG | HEIGHT: 71 IN | OXYGEN SATURATION: 97 % | BODY MASS INDEX: 29.82 KG/M2 | WEIGHT: 213 LBS | SYSTOLIC BLOOD PRESSURE: 134 MMHG | RESPIRATION RATE: 16 BRPM | HEART RATE: 72 BPM

## 2022-10-24 DIAGNOSIS — R97.20 ELEVATED PROSTATE SPECIFIC ANTIGEN (PSA): ICD-10-CM

## 2022-10-24 DIAGNOSIS — Z23 NEED FOR VACCINATION: ICD-10-CM

## 2022-10-24 DIAGNOSIS — H93.13 TINNITUS, BILATERAL: ICD-10-CM

## 2022-10-24 DIAGNOSIS — Z12.5 PROSTATE CANCER SCREENING: ICD-10-CM

## 2022-10-24 DIAGNOSIS — E78.5 HYPERLIPIDEMIA LDL GOAL <100: ICD-10-CM

## 2022-10-24 DIAGNOSIS — Z00.00 ENCOUNTER FOR MEDICARE ANNUAL WELLNESS EXAM: Primary | ICD-10-CM

## 2022-10-24 DIAGNOSIS — N40.1 BENIGN PROSTATIC HYPERPLASIA WITH LOWER URINARY TRACT SYMPTOMS, SYMPTOM DETAILS UNSPECIFIED: ICD-10-CM

## 2022-10-24 DIAGNOSIS — F41.9 ANXIETY: ICD-10-CM

## 2022-10-24 DIAGNOSIS — B00.2 HERPETIC GINGIVOSTOMATITIS: ICD-10-CM

## 2022-10-24 DIAGNOSIS — N52.03 COMBINED ARTERIAL INSUFFICIENCY AND CORPORO-VENOUS OCCLUSIVE ERECTILE DYSFUNCTION: ICD-10-CM

## 2022-10-24 DIAGNOSIS — Z13.1 SCREENING FOR DIABETES MELLITUS: ICD-10-CM

## 2022-10-24 DIAGNOSIS — K21.9 GASTROESOPHAGEAL REFLUX DISEASE WITHOUT ESOPHAGITIS: ICD-10-CM

## 2022-10-24 DIAGNOSIS — I25.10 ATHEROSCLEROSIS OF NATIVE CORONARY ARTERY OF NATIVE HEART WITHOUT ANGINA PECTORIS: ICD-10-CM

## 2022-10-24 DIAGNOSIS — H90.3 SENSORINEURAL HEARING LOSS (SNHL) OF BOTH EARS: ICD-10-CM

## 2022-10-24 LAB
ALT SERPL W P-5'-P-CCNC: 33 U/L (ref 0–70)
CHOLEST SERPL-MCNC: 159 MG/DL
FASTING STATUS PATIENT QL REPORTED: YES
FASTING STATUS PATIENT QL REPORTED: YES
GLUCOSE BLD-MCNC: 115 MG/DL (ref 70–99)
HDLC SERPL-MCNC: 59 MG/DL
LDLC SERPL CALC-MCNC: 64 MG/DL
NONHDLC SERPL-MCNC: 100 MG/DL
PSA SERPL-MCNC: 14.1 UG/L (ref 0–4)
TRIGL SERPL-MCNC: 178 MG/DL

## 2022-10-24 PROCEDURE — G0402 INITIAL PREVENTIVE EXAM: HCPCS | Performed by: FAMILY MEDICINE

## 2022-10-24 PROCEDURE — 90472 IMMUNIZATION ADMIN EACH ADD: CPT | Performed by: FAMILY MEDICINE

## 2022-10-24 PROCEDURE — 84460 ALANINE AMINO (ALT) (SGPT): CPT | Performed by: FAMILY MEDICINE

## 2022-10-24 PROCEDURE — 99214 OFFICE O/P EST MOD 30 MIN: CPT | Mod: 25 | Performed by: FAMILY MEDICINE

## 2022-10-24 PROCEDURE — G0008 ADMIN INFLUENZA VIRUS VAC: HCPCS | Performed by: FAMILY MEDICINE

## 2022-10-24 PROCEDURE — G0103 PSA SCREENING: HCPCS | Performed by: FAMILY MEDICINE

## 2022-10-24 PROCEDURE — 36415 COLL VENOUS BLD VENIPUNCTURE: CPT | Performed by: FAMILY MEDICINE

## 2022-10-24 PROCEDURE — 91312 COVID-19,PF,PFIZER BOOSTER BIVALENT: CPT | Performed by: FAMILY MEDICINE

## 2022-10-24 PROCEDURE — 90715 TDAP VACCINE 7 YRS/> IM: CPT | Performed by: FAMILY MEDICINE

## 2022-10-24 PROCEDURE — 90677 PCV20 VACCINE IM: CPT | Performed by: FAMILY MEDICINE

## 2022-10-24 PROCEDURE — 82947 ASSAY GLUCOSE BLOOD QUANT: CPT | Performed by: FAMILY MEDICINE

## 2022-10-24 PROCEDURE — 0124A COVID-19,PF,PFIZER BOOSTER BIVALENT: CPT | Performed by: FAMILY MEDICINE

## 2022-10-24 PROCEDURE — 80061 LIPID PANEL: CPT | Performed by: FAMILY MEDICINE

## 2022-10-24 PROCEDURE — G0009 ADMIN PNEUMOCOCCAL VACCINE: HCPCS | Performed by: FAMILY MEDICINE

## 2022-10-24 PROCEDURE — 90662 IIV NO PRSV INCREASED AG IM: CPT | Performed by: FAMILY MEDICINE

## 2022-10-24 RX ORDER — VALACYCLOVIR HYDROCHLORIDE 500 MG/1
TABLET, FILM COATED ORAL
Qty: 20 TABLET | Refills: 0 | Status: SHIPPED | OUTPATIENT
Start: 2022-10-24 | End: 2023-09-13

## 2022-10-24 RX ORDER — PANTOPRAZOLE SODIUM 40 MG/1
TABLET, DELAYED RELEASE ORAL
Qty: 90 TABLET | Refills: 1 | Status: SHIPPED | OUTPATIENT
Start: 2022-10-24 | End: 2023-06-05

## 2022-10-24 RX ORDER — SILDENAFIL CITRATE 20 MG/1
TABLET ORAL
Qty: 30 TABLET | Refills: 11 | Status: SHIPPED | OUTPATIENT
Start: 2022-10-24 | End: 2023-02-14

## 2022-10-24 RX ORDER — FLUOXETINE 20 MG/1
20 TABLET, FILM COATED ORAL DAILY
Qty: 90 TABLET | Refills: 1 | Status: SHIPPED | OUTPATIENT
Start: 2022-10-24 | End: 2023-03-29

## 2022-10-24 RX ORDER — ATORVASTATIN CALCIUM 80 MG/1
TABLET, FILM COATED ORAL
Qty: 90 TABLET | Refills: 1 | Status: SHIPPED | OUTPATIENT
Start: 2022-10-24 | End: 2023-06-20

## 2022-10-24 ASSESSMENT — ACTIVITIES OF DAILY LIVING (ADL): CURRENT_FUNCTION: NO ASSISTANCE NEEDED

## 2022-10-24 ASSESSMENT — ENCOUNTER SYMPTOMS
EYE PAIN: 0
PALPITATIONS: 0
CHILLS: 0
DIARRHEA: 0
MYALGIAS: 0
NERVOUS/ANXIOUS: 0
COUGH: 0
SHORTNESS OF BREATH: 0
CONSTIPATION: 0
DYSURIA: 0
DIZZINESS: 0
ARTHRALGIAS: 1
NAUSEA: 0
SORE THROAT: 0
WEAKNESS: 0
HEADACHES: 0
HEARTBURN: 0
FEVER: 0
JOINT SWELLING: 0
ABDOMINAL PAIN: 0
PARESTHESIAS: 0
FREQUENCY: 0
HEMATOCHEZIA: 0
HEMATURIA: 0

## 2022-10-24 ASSESSMENT — PAIN SCALES - GENERAL: PAINLEVEL: MILD PAIN (3)

## 2022-10-24 NOTE — NURSING NOTE
Prior to immunization administration, verified patients identity using patient s name and date of birth. Please see Immunization Activity for additional information.     Screening Questionnaire for Adult Immunization    Are you sick today?   No   Do you have allergies to medications, food, a vaccine component or latex?   No   Have you ever had a serious reaction after receiving a vaccination?   No   Do you have a long-term health problem with heart, lung, kidney, or metabolic disease (e.g., diabetes), asthma, a blood disorder, no spleen, complement component deficiency, a cochlear implant, or a spinal fluid leak?  Are you on long-term aspirin therapy?   No   Do you have cancer, leukemia, HIV/AIDS, or any other immune system problem?   No   Do you have a parent, brother, or sister with an immune system problem?   No   In the past 3 months, have you taken medications that affect  your immune system, such as prednisone, other steroids, or anticancer drugs; drugs for the treatment of rheumatoid arthritis, Crohn s disease, or psoriasis; or have you had radiation treatments?   No   Have you had a seizure, or a brain or other nervous system problem?   No   During the past year, have you received a transfusion of blood or blood    products, or been given immune (gamma) globulin or antiviral drug?   No   For women: Are you pregnant or is there a chance you could become       pregnant during the next month?   No   Have you received any vaccinations in the past 4 weeks?   No     Immunization questionnaire answers were all negative.        Per orders of Dr. Euceda, injection of Influenza, PCV 20, Tdap, and Pfizer bivalent booster given by Kelvin Haji MA. Patient instructed to remain in clinic for 15 minutes afterwards, and to report any adverse reaction to me immediately.       Screening performed by Kelvin Haji MA on 10/24/2022 at 9:41 AM.

## 2022-10-24 NOTE — PATIENT INSTRUCTIONS
Patient Education   Personalized Prevention Plan  You are due for the preventive services outlined below.  Your care team is available to assist you in scheduling these services.  If you have already completed any of these items, please share that information with your care team to update in your medical record.  Health Maintenance Due   Topic Date Due     Hepatitis B Vaccine (1 of 3 - 3-dose series) Never done     Pneumococcal Vaccine (1 - PCV) Never done     FALL RISK ASSESSMENT  Never done     AORTIC ANEURYSM SCREENING (SYSTEM ASSIGNED)  Never done     PHQ-2 (once per calendar year)  01/01/2022     COVID-19 Vaccine (5 - Booster for Pfizer series) 07/01/2022     Annual Wellness Visit  08/27/2022     Cholesterol Lab  08/27/2022     ANNUAL REVIEW OF HM ORDERS  08/27/2022     Flu Vaccine (1) 09/01/2022

## 2022-10-24 NOTE — PROGRESS NOTES
"SUBJECTIVE:   Grey is a 66 year old who presents for Preventive Visit.      Patient has been advised of split billing requirements and indicates understanding: Yes     Are you in the first 12 months of your Medicare coverage?  Yes,  Visual Acuity:  Right Eye: 20/32   Left Eye: 20/25  Both Eyes: 20/25    Healthy Habits:     In general, how would you rate your overall health?  Excellent    Frequency of exercise:  4-5 days/week    Duration of exercise:  45-60 minutes    Do you usually eat at least 4 servings of fruit and vegetables a day, include whole grains    & fiber and avoid regularly eating high fat or \"junk\" foods?  Yes    Taking medications regularly:  Yes    Medication side effects:  Lightheadedness    Ability to successfully perform activities of daily living:  No assistance needed    Home Safety:  No safety concerns identified    Hearing Impairment:  Difficulty following dialogue in the theater, need to ask people to speak up or repeat themselves and difficulty understanding soft or whispered speech    In the past 6 months, have you been bothered by leaking of urine?  No    In general, how would you rate your overall mental or emotional health?  Excellent      PHQ-2 Total Score: 0    Additional concerns today:  Yes    Do you feel safe in your environment? Yes    Have you ever done Advance Care Planning? (For example, a Health Directive, POLST, or a discussion with a medical provider or your loved ones about your wishes): No, advance care planning information given to patient to review.  Patient plans to discuss their wishes with loved ones or provider.         Fall risk  Fallen 2 or more times in the past year?: No  Any fall with injury in the past year?: No    Cognitive Screening   1) Repeat 3 items (Leader, Season, Table)    2) Clock draw: NORMAL  3) 3 item recall: Recalls 3 objects  Results: 3 items recalled: COGNITIVE IMPAIRMENT LESS LIKELY    Mini-CogTM Copyright S Jaki. Licensed by the author for " use in [facility]; reprinted with permission (chrismichel@[facility]). All rights reserved.      Do you have sleep apnea, excessive snoring or daytime drowsiness?: yes    Past medical, family, and social histories, medications, and allergies are reviewed and updated in Epic.      Social History     Tobacco Use     Smoking status: Never     Smokeless tobacco: Never     Tobacco comments:     Less than 100 cigars in his lifetime   Substance Use Topics     Alcohol use: Yes     Comment: weekends     If you drink alcohol do you typically have >3 drinks per day or >7 drinks per week? Yes      Alcohol Use 10/24/2022   Prescreen: >3 drinks/day or >7 drinks/week? Yes   Prescreen: >3 drinks/day or >7 drinks/week? -   AUDIT SCORE  8       Concern - ringing in the ears     When did it start?: chronic    Any strain/injury, other illness, or event to trigger this problem?   YES, went to a rock concert within the past few months. Despite wearing earplugs, the very loud concert made this problem worse    Previous history of similar problem:   YES- (I believe he has complained tinnitus or hearing loss several years ago)      Location:           Both ears    Describe it:   High pitched ringing    Severity:       Progression of symptoms from onset until now:  worsening      Accompanying Signs & Symptoms:  Some hearing loss   What have you noticed that tends to make this worse?     Worse when he is experiencing mild side effects from COVID-19 vaccine      What have you noticed that tends to make this better?     None      What have you done (this time) to try to treat this problem yourself?     None      Did it help?                 Current providers sharing in care for this patient include:   Patient Care Team:  Navin Euceda MD as PCP - General (Family Practice)  Navin Euceda MD (Family Practice)  Easton Silva MD as Assigned Surgical Provider  Nathalie Araya MD as Assigned Sleep  Provider  Randolph Noonan MD as Assigned Musculoskeletal Provider  Navin Euceda MD as Assigned PCP    The following health maintenance items are reviewed in Epic and correct as of today:  Health Maintenance   Topic Date Due     HEPATITIS B IMMUNIZATION (1 of 3 - 3-dose series) Never done     Pneumococcal Vaccine: 65+ Years (1 - PCV) Never done     COVID-19 Vaccine (5 - Booster for Pfizer series) 07/01/2022     LIPID  08/27/2022     INFLUENZA VACCINE (1) 09/01/2022     DTAP/TDAP/TD IMMUNIZATION (4 - Td or Tdap) 08/08/2023     MEDICARE ANNUAL WELLNESS VISIT  10/24/2023     ANNUAL REVIEW OF HM ORDERS  10/24/2023     FALL RISK ASSESSMENT  10/24/2023     ADVANCE CARE PLANNING  10/24/2027     COLORECTAL CANCER SCREENING  07/09/2028     HEPATITIS C SCREENING  Completed     HIV SCREENING  Completed     PHQ-2 (once per calendar year)  Completed     ZOSTER IMMUNIZATION  Completed     AORTIC ANEURYSM SCREENING (SYSTEM ASSIGNED)  Completed     IPV IMMUNIZATION  Aged Out     MENINGITIS IMMUNIZATION  Aged Out         Review of Systems   Constitutional: Negative for chills and fever.   HENT: Positive for hearing loss. Negative for congestion, ear pain and sore throat.    Eyes: Negative for pain and visual disturbance.   Respiratory: Negative for cough and shortness of breath.    Cardiovascular: Negative for chest pain, palpitations and peripheral edema.   Gastrointestinal: Negative for abdominal pain, constipation, diarrhea, heartburn, hematochezia and nausea.   Genitourinary: Positive for urgency. Negative for dysuria, frequency, genital sores, hematuria, impotence and penile discharge.   Musculoskeletal: Positive for arthralgias. Negative for joint swelling and myalgias.   Skin: Negative for rash.   Neurological: Negative for dizziness, weakness, headaches and paresthesias.   Psychiatric/Behavioral: Negative for mood changes. The patient is not nervous/anxious.        OBJECTIVE:   /80 (BP Location: Left arm,  "Patient Position: Chair, Cuff Size: Adult Regular)   Pulse 72   Temp 97.9  F (36.6  C) (Oral)   Resp 16   Ht 1.795 m (5' 10.67\")   Wt 96.6 kg (213 lb)   SpO2 97%   BMI 29.99 kg/m   Estimated body mass index is 29.99 kg/m  as calculated from the following:    Height as of this encounter: 1.795 m (5' 10.67\").    Weight as of this encounter: 96.6 kg (213 lb).  Physical Exam  GENERAL: healthy, alert and no distress  EYES: Eyes grossly normal to inspection, PERRL and conjunctivae and sclerae normal  HENT: ear canals and TM's normal, nose and mouth without ulcers or lesions  NECK: no adenopathy, no asymmetry, masses, or scars and thyroid normal to palpation  RESP: lungs clear to auscultation - no rales, rhonchi or wheezes  CV: regular rate and rhythm, normal S1 S2, no S3 or S4, no murmur, click or rub, no peripheral edema and peripheral pulses strong  ABDOMEN: soft, nontender, no hepatosplenomegaly, no masses and bowel sounds normal   (male): normal male genitalia without lesions or urethral discharge, no hernia  MS: no gross musculoskeletal defects noted, no edema  SKIN: no suspicious lesions or rashes  NEURO: Normal strength and tone, mentation intact and speech normal. Reflexes normal and symmetric in the upper and lower extremities.   PSYCH: mentation appears normal, affect normal/bright         ASSESSMENT / PLAN:   (Z00.00) Encounter for Medicare annual wellness exam  (primary encounter diagnosis)  Comment: Negative screening exam; up-to-date on preventive services.   Plan: Pneumococcal 20 Valent Conjugate (PCV20),         Glucose, Prostate Specific Antigen Screen,         INFLUENZA, QUAD, HD, PF, 65+ (FLUZONE HD),         COVID-19,PF,PFIZER BOOSTER BIVALENT, Abdominal         Aortic Aneurysm Screening/Tracking        Return in about 53 weeks (around 10/30/2023) for Medicare annual wellness exam, cholesterol, recheck medications.      (I25.10) Atherosclerosis of native coronary artery of native heart without " angina pectoris  (E78.5) Hyperlipidemia LDL goal <100  Comment: clinically stable, on a high-intensity statin   Plan: Lipid panel reflex to direct LDL Non-fasting,         ALT, Glucose, atorvastatin (LIPITOR) 80 MG         tablet        Return in about 53 weeks (around 10/30/2023) for Medicare annual wellness exam, cholesterol, recheck medications.        (K21.9) Gastroesophageal reflux disease without esophagitis  Comment: prescription update   Plan: pantoprazole (PROTONIX) 40 MG EC tablet            (N40.1) Benign prostatic hyperplasia with lower urinary tract symptoms, symptom details unspecified  (R97.20) Elevated prostate specific antigen (PSA)  (Z12.5) Prostate cancer screening  Comment: the patient has been off of his alpha-blocker and finasteride since April due to side effects (dizziness, fatigue, likely from the alpha-blocker)  Plan: Prostate Specific Antigen Screen        Patient might consider resuming the finasteride.    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 (see above)  Plan: sildenafil (REVATIO) 20 MG tablet            (B00.2) Herpetic gingivostomatitis  Comment: prescription update   Plan: valACYclovir (VALTREX) 500 MG tablet            (H90.3) Sensorineural hearing loss (SNHL) of both ears  (H93.13) Tinnitus, bilateral  Comment:   Plan: Adult Audiology  Referral            (Z23) Need for vaccination  Comment:   Plan: Pneumococcal 20 Valent Conjugate (PCV20),         INFLUENZA, QUAD, HD, PF, 65+ (FLUZONE HD),         COVID-19,PF,PFIZER BOOSTER BIVALENT, TDAP         VACCINE (Adacel, Boostrix)  [[id number]]                    COUNSELING:  Reviewed preventive health counseling, as reflected in  "patient instructions  Special attention given to:       Consider AAA screening for ages 65-75 and smoking history       Hearing screening       Immunizations    Vaccinated for: COVID-19, Influenza, Pneumococcal and TDAP             Prostate cancer screening    Estimated body mass index is 29.99 kg/m  as calculated from the following:    Height as of this encounter: 1.795 m (5' 10.67\").    Weight as of this encounter: 96.6 kg (213 lb).      He reports that he has never smoked. He has never used smokeless tobacco.      Appropriate preventive services were discussed with this patient, including applicable screening as appropriate for cardiovascular disease, diabetes, osteopenia/osteoporosis, and glaucoma.  As appropriate for age/gender, discussed screening for colorectal cancer, prostate cancer, breast cancer, and cervical cancer. Checklist reviewing preventive services available has been given to the patient.    Reviewed patients plan of care and provided an AVS. The Basic Care Plan (routine screening as documented in Health Maintenance) for Grey meets the Care Plan requirement. This Care Plan has been established and reviewed with the Patient.    Counseling Resources:  ATP IV Guidelines  Pooled Cohorts Equation Calculator  Breast Cancer Risk Calculator  Breast Cancer: Medication to Reduce Risk  FRAX Risk Assessment  ICSI Preventive Guidelines  Dietary Guidelines for Americans, 2010  [employer]'s MyPlate  ASA Prophylaxis  Lung CA Screening    Navin Euceda MD  [facility]    This document serves as a record of the services and decisions personally performed and made by Dr. Euceda. It was created on his behalf by Homer Cuello, a trained medical scribe. The creation of this document is based the provider's statements to the medical scribe.  Homer Cuello,  9:58 AM    "

## 2022-10-25 ENCOUNTER — OFFICE VISIT (OUTPATIENT)
Dept: AUDIOLOGY | Facility: CLINIC | Age: 67
End: 2022-10-25
Attending: FAMILY MEDICINE
Payer: COMMERCIAL

## 2022-10-25 DIAGNOSIS — H90.3 SENSORINEURAL HEARING LOSS (SNHL) OF BOTH EARS: ICD-10-CM

## 2022-10-25 DIAGNOSIS — H93.13 TINNITUS, BILATERAL: ICD-10-CM

## 2022-10-25 PROCEDURE — 92557 COMPREHENSIVE HEARING TEST: CPT | Performed by: AUDIOLOGIST

## 2022-10-25 PROCEDURE — 92550 TYMPANOMETRY & REFLEX THRESH: CPT | Performed by: AUDIOLOGIST

## 2022-10-25 NOTE — PROGRESS NOTES
AUDIOLOGY REPORT    SUBJECTIVE:  Grey Markham is a 66 year old male who was seen in the Audiology Clinic at the [facility] for audiologic evaluation, referred by Navin Euceda M.D.  The patient reports bilateral tinnitus and perceived hearing loss. The patient denies  bilateral otalgia, bilateral drainage and bilateral aural fullness. He does report a family history of hearing loss and occasional noise exposure. The patient notes difficulty with communication in a variety of listening situations.  They were unaccompanied today.    OBJECTIVE:  Abuse Screening:  Do you feel unsafe at home or work/school? No  Do you feel threatened by someone? No  Does anyone try to keep you from having contact with others, or doing things outside of your home? No  Physical signs of abuse present? No     Otoscopic exam indicates ears are clear of cerumen bilaterally     Pure Tone Thresholds assessed using conventional audiometry with good  reliability from 250-8000 Hz bilaterally using insert earphones and circumaural headphones     RIGHT:  normal from 250-2000 Hz sloping to mild-moderate sensorineural hearing loss    LEFT:    normal from 250-2000 Hz sloping to mild-moderate sensorineural hearing loss    Tympanogram:    RIGHT: normal eardrum mobility    LEFT:   normal eardrum mobility    Reflexes (reported by stimulus ear):  RIGHT: Ipsilateral is present at normal levels  RIGHT: Contralateral is elevated at frequencies tested  LEFT:   Ipsilateral is present at normal levels  LEFT:   Contralateral is elevated at frequencies tested      Speech Reception Threshold:    RIGHT: 10 dB HL    LEFT:   10 dB HL    Speech Reception Thresholds are in good agreement with pure tone thresholds.    Word Recognition Score:     RIGHT: 96% at 50 dB HL using NU-6 recorded word list.    LEFT:   100% at 50 dB HL using NU-6 recorded word list.      ASSESSMENT:     ICD-10-CM    1. Sensorineural hearing loss (SNHL) of both ears   H90.3 Adult Audiology  Referral      2. Tinnitus, bilateral  H93.13 Adult Audiology  Referral          Today s results were discussed with the patient in detail. We spoke about various causes of tinnitus including hearing loss.     PLAN:  Patient was counseled regarding hearing loss and impact on communication. Hearing aids are recommended when the patient feels they are needed. It is recommended that the patient continue wearing hearing protection when exposed to noise and a repeat hearing evaluation is recommended in 2-3 years or sooner, if symptoms worsen.  Please call this clinic with questions regarding these results or recommendations.          Audie Desouza MA, CCC-A  [place] Licensed Audiologist #[id number]  10/25/2022

## 2023-01-17 ENCOUNTER — ALLIED HEALTH/NURSE VISIT (OUTPATIENT)
Dept: FAMILY MEDICINE | Facility: CLINIC | Age: 68
End: 2023-01-17
Payer: COMMERCIAL

## 2023-01-17 VITALS — DIASTOLIC BLOOD PRESSURE: 80 MMHG | SYSTOLIC BLOOD PRESSURE: 133 MMHG | HEART RATE: 68 BPM

## 2023-01-17 DIAGNOSIS — Z01.30 BLOOD PRESSURE CHECK: Primary | ICD-10-CM

## 2023-01-17 PROCEDURE — 99207 PR NO CHARGE NURSE ONLY: CPT

## 2023-01-17 NOTE — PROGRESS NOTES
I met with Grey Markham at the request of Dr. Euceda to recheck his blood pressure.  Blood pressure medications on the med list were reviewed with patient.    Patient has taken all medications as per usual regimen: Yes  Patient reports tolerating them without any issues or concerns: Yes    Vitals:    01/17/23 1324 01/17/23 1329   BP: (!) 144/86 133/80   BP Location: Left arm    Patient Position: Chair    Cuff Size: Adult Large    Pulse: 68        After 5 minutes, the patient's blood pressure was <140/90, the previous encounter was reviewed, recorded blood pressure below 140/90. Patient was discharged and the note will be sent to the provider for final review.    KIM Melton MA

## 2023-02-03 ENCOUNTER — MYC MEDICAL ADVICE (OUTPATIENT)
Dept: FAMILY MEDICINE | Facility: CLINIC | Age: 68
End: 2023-02-03
Payer: COMMERCIAL

## 2023-02-03 NOTE — TELEPHONE ENCOUNTER
Called patient and reviewed options listed in MyC message (e-Visit vs in-person appt). Patient states he is unable to get e-Visit today due to the weekend, and would prefer in-person. Appt made for next week, patient verbalized understanding. Patient states he would like to try to re-submit for e-Visit on Monday, and if he gets response, will cancel in-person appt. No further questions or concerns      EVELYNE Negron, RN  [facility]

## 2023-02-07 ENCOUNTER — OFFICE VISIT (OUTPATIENT)
Dept: FAMILY MEDICINE | Facility: CLINIC | Age: 68
End: 2023-02-07
Payer: COMMERCIAL

## 2023-02-07 VITALS
BODY MASS INDEX: 30.32 KG/M2 | HEIGHT: 71 IN | HEART RATE: 60 BPM | TEMPERATURE: 97.1 F | DIASTOLIC BLOOD PRESSURE: 85 MMHG | SYSTOLIC BLOOD PRESSURE: 150 MMHG | OXYGEN SATURATION: 97 % | WEIGHT: 216.6 LBS

## 2023-02-07 DIAGNOSIS — I10 ESSENTIAL HYPERTENSION WITH GOAL BLOOD PRESSURE LESS THAN 140/90: ICD-10-CM

## 2023-02-07 DIAGNOSIS — R23.4 SKIN FISSURE: ICD-10-CM

## 2023-02-07 DIAGNOSIS — L57.0 ACTINIC KERATOSIS: Primary | ICD-10-CM

## 2023-02-07 LAB
ANION GAP SERPL CALCULATED.3IONS-SCNC: 3 MMOL/L (ref 3–14)
BUN SERPL-MCNC: 16 MG/DL (ref 7–30)
CALCIUM SERPL-MCNC: 9 MG/DL (ref 8.5–10.1)
CHLORIDE BLD-SCNC: 111 MMOL/L (ref 94–109)
CO2 SERPL-SCNC: 27 MMOL/L (ref 20–32)
CREAT SERPL-MCNC: 0.98 MG/DL (ref 0.66–1.25)
GFR SERPL CREATININE-BSD FRML MDRD: 85 ML/MIN/1.73M2
GLUCOSE BLD-MCNC: 96 MG/DL (ref 70–99)
POTASSIUM BLD-SCNC: 4.9 MMOL/L (ref 3.4–5.3)
SODIUM SERPL-SCNC: 141 MMOL/L (ref 133–144)

## 2023-02-07 PROCEDURE — 17000 DESTRUCT PREMALG LESION: CPT | Performed by: FAMILY MEDICINE

## 2023-02-07 PROCEDURE — 99214 OFFICE O/P EST MOD 30 MIN: CPT | Mod: 25 | Performed by: FAMILY MEDICINE

## 2023-02-07 PROCEDURE — 36415 COLL VENOUS BLD VENIPUNCTURE: CPT | Performed by: FAMILY MEDICINE

## 2023-02-07 PROCEDURE — 80048 BASIC METABOLIC PNL TOTAL CA: CPT | Performed by: FAMILY MEDICINE

## 2023-02-07 RX ORDER — LISINOPRIL 20 MG/1
20 TABLET ORAL DAILY
Qty: 90 TABLET | Refills: 0 | Status: SHIPPED | OUTPATIENT
Start: 2023-02-07 | End: 2023-05-02

## 2023-02-07 ASSESSMENT — PAIN SCALES - GENERAL: PAINLEVEL: NO PAIN (0)

## 2023-02-07 NOTE — PROGRESS NOTES
Assessment & Plan     (L57.0) Actinic keratosis  (primary encounter diagnosis)  Comment: On the ala of the left nostril.  Plan: DESTRUCT BENIGN LESION, UP TO 14        The lesion is treated with cryotherapy using liquid nitrogen x 4 seconds x 3 applications. The patient tolerated the procedure well. Reexamine at his follow up appointment.    (R23.4) Skin fissure  Comment: No other skin change or lesion present at this site. I think this is probably the effect of dry air, chapped skin from rhinorrhea, etc.  Plan: The fissure is coated with a thin layer of Dermabond equivalent.    (I10) Essential hypertension with goal blood pressure less than 140/90  Comment: New diagnosis.  Plan: lisinopril (ZESTRIL) 20 MG tablet, Basic         metabolic panel        Return in about 4 weeks (around 3/7/2023) for blood pressure check.        [facility]uced[facility] MD  [facility] is a 67 year old, presenting for the following health issues:  Nasal Injury      History of Present Illness       Reason for visit:  Nasal cauterization  Symptom onset:  More than a month  Symptoms include:  White bump, bleeding/scabbing  Symptom intensity:  Moderate  Symptom progression:  Staying the same  Had these symptoms before:  Yes  Has tried/received treatment for these symptoms:  No  What makes it worse:  No  What makes it better:  No    He eats 2-3 servings of fruits and vegetables daily.He consumes 1 sweetened beverage(s) daily.He exercises with enough effort to increase his heart rate 60 or more minutes per day.  He exercises with enough effort to increase his heart rate 4 days per week.   He is taking medications regularly.     The patient reports a bump in his left nostril that has been present for roughly the last 4 months. He has treated with antibiotic ointment as well as Vaseline, and notes that Vaseline has seemed to help the most. He also has consistent drainage from this lesion. The patient  "also notes a history of a similar issue in his right nostril that resolved shortly before the issue began in his left nostril.       The patient also reports that his SBP has been hovering around 150 and his DBP in the 70s for the last few weeks. He reports that he has never taken any blood pressure medication in the past.    Review of Systems         Objective    BP (!) 150/85 (BP Location: Left arm, Patient Position: Sitting, Cuff Size: Adult Large)   Pulse 60   Temp 97.1  F (36.2  C) (Tympanic)   Ht 1.795 m (5' 10.67\")   Wt 98.2 kg (216 lb 9.6 oz)   SpO2 97%   BMI 30.49 kg/m    Body mass index is 30.49 kg/m .  Physical Exam   GENERAL: healthy, alert and no distress  EYES: Eyes grossly normal to inspection, PERRL, EOMI, sclerae white and conjunctivae normal  MS: no gross musculoskeletal defects noted, no edema  SKIN: 4 x 2 mm scaly patch of skin at the rim of the ala of the left nostril. At the peak of the nostril at the tip of the nose the skin is fissured but otherwise appears normal.  NEURO: Normal strength and tone, sensory exam grossly normal, mentation intact, oriented times 3 and cranial nerves 2-12 intact  PSYCH: mentation appears normal, affect normal/bright               This document serves as a record of the services and decisions personally performed and made by Dr. Euceda. It was created on his behalf by Homer Cuello, a trained medical scribe. The creation of this document is based the provider's statements to the medical scribe.  Homer Cuello,  12:24 PM    "

## 2023-02-09 ENCOUNTER — MYC MEDICAL ADVICE (OUTPATIENT)
Dept: UROLOGY | Facility: CLINIC | Age: 68
End: 2023-02-09
Payer: COMMERCIAL

## 2023-02-14 DIAGNOSIS — N52.03 COMBINED ARTERIAL INSUFFICIENCY AND CORPORO-VENOUS OCCLUSIVE ERECTILE DYSFUNCTION: ICD-10-CM

## 2023-02-14 RX ORDER — SILDENAFIL CITRATE 20 MG/1
TABLET ORAL
Qty: 30 TABLET | Refills: 4 | Status: SHIPPED | OUTPATIENT
Start: 2023-02-14 | End: 2023-05-24

## 2023-03-09 ENCOUNTER — VIRTUAL VISIT (OUTPATIENT)
Dept: UROLOGY | Facility: CLINIC | Age: 68
End: 2023-03-09
Payer: COMMERCIAL

## 2023-03-09 DIAGNOSIS — N40.1 BENIGN PROSTATIC HYPERPLASIA WITH LOWER URINARY TRACT SYMPTOMS, SYMPTOM DETAILS UNSPECIFIED: Primary | ICD-10-CM

## 2023-03-09 PROCEDURE — 99212 OFFICE O/P EST SF 10 MIN: CPT | Mod: TEL | Performed by: UROLOGY

## 2023-03-09 NOTE — PROGRESS NOTES
Grey is a 67 year old who is being evaluated via a billable telephone visit.      What phone number would you like to be contacted at?    How would you like to obtain your AVS? MyChart    Distant Location (provider location):  Off-site        Subjective   Grey is a 67 year old, presenting for the following health issues:  Video Visit      HPI     Patient is a 67-year-old male with long history of lower urinary tract symptoms due to BPH.  His prostate size was under 120 cm  in 2020.  He could not tolerate alpha-blocker due to dizziness and 5 alpha reductase inhibitor due to sexual dysfunction.  He is not interested in having surgery at this time.    Review of Systems   Constitutional, HEENT, cardiovascular, pulmonary, gi and gu systems are negative, except as otherwise noted.      Objective           Vitals:  No vitals were obtained today due to virtual visit.    Physical Exam   healthy, alert and no distress  PSYCH: Alert and oriented times 3; coherent speech, normal   rate and volume, able to articulate logical thoughts, able   to abstract reason, no tangential thoughts, no hallucinations   or delusions  His affect is normal  RESP: No cough, no audible wheezing, able to talk in full sentences  Remainder of exam unable to be completed due to telephone visits    BPH: Treatment options discussed.  We will make a referral for patient to see Dr. Loya with intervention radiology for prostatic artery embolization.            Phone call duration: 10 minutes

## 2023-03-13 ENCOUNTER — TELEPHONE (OUTPATIENT)
Dept: SURGERY | Facility: CLINIC | Age: 68
End: 2023-03-13
Payer: COMMERCIAL

## 2023-03-13 ENCOUNTER — TELEPHONE (OUTPATIENT)
Dept: SURGERY | Facility: CLINIC | Age: 68
End: 2023-03-13

## 2023-03-13 DIAGNOSIS — N40.1 BENIGN PROSTATIC HYPERPLASIA WITH LOWER URINARY TRACT SYMPTOMS, SYMPTOM DETAILS UNSPECIFIED: Primary | ICD-10-CM

## 2023-03-13 NOTE — TELEPHONE ENCOUNTER
Called and spoke with Pt to triage BPH to determine appropriate scheduling and necessity for imaging.     Dx: BPH  Referring provider: Dr. Easton Lucas MD      Imaging:    MRI prostate WO & W contrast 11/24/2020    1. Are you currently following with a Urologist? Dr. Silva  2. Have you completed any imaging of your prostate? MRI Prostate 11/24/2020  3. Cystoscopy in the past? No   4. Uroflow/Urodynamics in the past? No  5. What are your last PSA levels? If high, have you ever had a biopsy?  PSA 14.10 Biopsy done 1/12/2021  6. Last IPSS score? E-mail copy to patient.   7. Currently on prostate medications? Was previously on Finasteride  8. Issues with ED? Viagra Or retrograde ejaculation? No  9. Any prior prostate treatments? No       Symptoms: Urinary frequency, 2-3 times a night. Mild incomplete emptying.

## 2023-03-13 NOTE — TELEPHONE ENCOUNTER
Called patient, no answer left VM with instructions to return call to discuss referral to Interventional Radiology. Provided patient with my direct office line.     Khushboo LLOYD RN, BSN   Interventional Radiology [facility]   [phone]

## 2023-03-16 ENCOUNTER — ANCILLARY PROCEDURE (OUTPATIENT)
Dept: CT IMAGING | Facility: CLINIC | Age: 68
End: 2023-03-16
Attending: RADIOLOGY
Payer: COMMERCIAL

## 2023-03-16 VITALS — DIASTOLIC BLOOD PRESSURE: 77 MMHG | SYSTOLIC BLOOD PRESSURE: 132 MMHG

## 2023-03-16 DIAGNOSIS — N40.1 BENIGN PROSTATIC HYPERPLASIA WITH LOWER URINARY TRACT SYMPTOMS, SYMPTOM DETAILS UNSPECIFIED: ICD-10-CM

## 2023-03-16 PROCEDURE — 72191 CT ANGIOGRAPH PELV W/O&W/DYE: CPT | Performed by: RADIOLOGY

## 2023-03-16 RX ORDER — NITROGLYCERIN 0.4 MG/1
0.4 TABLET SUBLINGUAL ONCE
Status: COMPLETED | OUTPATIENT
Start: 2023-03-16 | End: 2023-03-16

## 2023-03-16 RX ORDER — IOPAMIDOL 755 MG/ML
100 INJECTION, SOLUTION INTRAVASCULAR ONCE
Status: COMPLETED | OUTPATIENT
Start: 2023-03-16 | End: 2023-03-16

## 2023-03-16 RX ADMIN — IOPAMIDOL 100 ML: 755 INJECTION, SOLUTION INTRAVASCULAR at 14:14

## 2023-03-16 RX ADMIN — NITROGLYCERIN 0.4 MG: 0.4 TABLET SUBLINGUAL at 14:56

## 2023-03-20 NOTE — PROGRESS NOTES
Phone-Visit Details    Type of service:  Video Visit    Video Start Time (time video started): 12:27 pm    Video End Time (time video stopped): 12:50 pm    Originating Location (pt. Location): Home        Distant Location (provider location):  On-site    Mode of Communication:  Video Conference via [facility]          INTERVENTIONAL RADIOLOGY CONSULTATION    Name: Grey Markham  Age: 67 year old   Referring Physician: Dr. Euceda   REASON FOR REFERRAL: Prostate artery embolization     HPI: 67 year old male with history of BPH presenting for PAE evaluation.     Per patient, his main symptom is urinary urgency for about 5 years. He has wet the bed twice at night. He has tried alpha blockers but had to stop due to lightheadedness. He has nocturia 2-3 times per night but can be up to 4-5 times. Has no issues with his kidneys, ureters, or bladder.     IPSS: 17  QOL: 4    PAST MEDICAL HISTORY:   Past Medical History:   Diagnosis Date     Coronary atherosclerosis of native coronary artery 12/30/09    10%  mid-LAD lesion, Dr. Mendoza     DDD (degenerative disc disease), lumbar     L-4-L5, L5-S1     GERD (gastroesophageal reflux disease) 11/2007    no Rose's     Glaucoma      Glaucoma      Hyperlipidemia LDL goal <100     hyper-TG, on Simv 80mg since at least 1/4/10     Oral herpes        PAST SURGICAL HISTORY:   Past Surgical History:   Procedure Laterality Date     ARTHROSCOPY KNEE RT/LT Right 02/22/2008    [facility], Dr. Shaista HADDAD GLAUCOMA SURG,IRIDENCLEISIS  04/01/2003    LASER     C GLAUCOMA SURG,IRIDENCLEISIS  06/01/2006    laser     CARDIAC CATHERIZATION  12/30/2009    10% stenosis of mid LAD, EF 70%, nl otherwise Dr. Elliot Mendoza, [facility]     CATARACT IOL, RT/LT Right 05/26/2016     CATARACT IOL, RT/LT Left 06/2016     COLONOSCOPY WITH CO2 INSUFFLATION N/A 07/09/2018    Procedure: COLONOSCOPY WITH CO2 INSUFFLATION;  Screen for colon cancer;  Surgeon: Navin Vasquez DO;  Location:  OR     COMBINED  ESOPHAGOSCOPY, GASTROSCOPY, DUODENOSCOPY (EGD) WITH CO2 INSUFFLATION N/A 2017    Procedure: COMBINED ESOPHAGOSCOPY, GASTROSCOPY, DUODENOSCOPY (EGD) WITH CO2 INSUFFLATION;  EGD-GASTROESOPHAGEAL REFLUX DISEASE WITHOUT ESOPHAGITIS/ DARNELL;  Surgeon: Navin Vasquez DO;  Location: [facility] OR     ESOPHAGOSCOPY, GASTROSCOPY, DUODENOSCOPY (EGD), COMBINED N/A 2017    Procedure: COMBINED ESOPHAGOSCOPY, GASTROSCOPY, DUODENOSCOPY (EGD), BIOPSY SINGLE OR MULTIPLE;;  Surgeon: Navin Vasquez DO;  Location: [facility] OR     GLAUCOMA SURGERY      ahmed valve x 1 le x 2 re     GLAUCOMA SURGERY Right 2015    trabeculectomy with [facility]     HERNIA REPAIR, UMBILICAL  age 6     LASER SURGERY OF EYE      6-7 lasers [facility] glaucoma     LASER SURGERY OF EYE Right 2015    [facility]     PROSTATE BIOPSY, NEEDLE, SATURATION SAMPLING  2014    Dr. Zaragoza       FAMILY HISTORY:   Family History   Problem Relation Age of Onset     Glaucoma Mother 87     Cerebrovascular Disease Mother      Snoring Mother      Lymphoma Father          age 73     Glaucoma Father      Cancer Maternal Grandmother      Liver Cancer Other         aunt     Diabetes Other         mother's relatives     C.A.D. No family hx of      Macular Degeneration No family hx of      Hypertension No family hx of      Thyroid Disease No family hx of      Anesthesia Reaction No family hx of      Bleeding Disorder No family hx of      Thrombophilia No family hx of        SOCIAL HISTORY:   Social History     Tobacco Use     Smoking status: Never     Smokeless tobacco: Never     Tobacco comments:     Less than 100 cigars in his lifetime   Substance Use Topics     Alcohol use: Yes     Comment: weekends       PROBLEM LIST:   Patient Active Problem List    Diagnosis Date Noted     Essential hypertension with goal blood pressure less than 140/90 2023     Priority: Medium     Tinnitus, bilateral 2021     Priority: Medium     Benign prostatic hyperplasia with lower  urinary tract symptoms, symptom details unspecified 11/02/2020     Priority: Medium     Combined arterial insufficiency and corporo-venous occlusive erectile dysfunction 12/06/2016     Priority: Medium     Photophobia of both eyes 05/17/2016     Priority: Medium     Dysuria 11/12/2014     Priority: Medium     Obesity, Class I, BMI 30-34.9 10/07/2014     Priority: Medium     Elevated prostate specific antigen (PSA) 10/07/2014     Priority: Medium     Negative biopsy 7/7/16       Glaucoma      Priority: Medium     Imo Update utility       Advance care planning 06/26/2012     Priority: Medium     Advance Care Planning 08/21/2015: Discussed advance care planning with patient; information given to patient to review 08/21/2015  Navin Euceda MD   Advance Care Planning 06/26/2012: Discussed advance care planning with patient; information given to patient to review. 6/26/2012  KIM Melton MA                   Anxiety 06/21/2011     Priority: Medium     Oral herpes      Priority: Medium     Hyperlipidemia LDL goal <100 01/04/2010     Priority: Medium     CAD, hypertriglyceridemia       Atherosclerosis of native coronary artery of native heart without angina pectoris 12/30/2009     Priority: Medium     10%  mid-LAD lesion, Dr. Mendoza       Gastroesophageal reflux disease without esophagitis 11/01/2007     Priority: Medium     no Rose's         MEDICATIONS:   Prescription Medications as of 3/20/2023       Rx Number Disp Refills Start End Last Dispensed Date Next Fill Date Owning Pharmacy    aspirin 81 MG tablet  100 tablet prn 4/1/2011        Sig: Take 1 tablet by mouth daily. *    Class: Historical    Route: Oral    atorvastatin (LIPITOR) 80 MG tablet  90 tablet 1 10/24/2022    [id number] - [place], [place] - [address] AT [facility]    Sig: TAKE 1 TABLET(80 MG) BY MOUTH DAILY FOR CHOLESTEROL Strength: 80 mg    Class: E-Prescribe    Earliest Fill Date: 10/24/2022    finasteride  (PROSCAR) 5 MG tablet  90 tablet 1 12/30/2021    [facility] #[id number], [address]    Sig: Take 1 tablet (5 mg) by mouth daily Please ensure you make an appointment before this fill runs out.    Class: E-Prescribe    Route: Oral    FLUoxetine 20 MG tablet  90 tablet 1 10/24/2022    [facility] #[id number] [facility], [place] [id number] [facility]    Sig: Take 1 tablet (20 mg) by mouth daily for anxiety prevention.    Class: E-Prescribe    Route: Oral    No prior authorization was found for this prescription.    Found prior authorization for another prescription for the same medication: Canceled - Other (The medication order is discontinued.)    ketoconazole (NIZORAL) 2 % external cream  30 g 1 10/30/2021    [facility] #[id number], [address]    Sig: APPLY TOPICALLY TO SEBORRHEA OF FACE TWICE DAILY AS NEEDED    Class: E-Prescribe    lisinopril (ZESTRIL) 20 MG tablet  90 tablet 0 2/7/2023    [facility] #[id number] [place] [address], [address]    Sig: Take 1 tablet (20 mg) by mouth daily for blood pressure    Class: E-Prescribe    Route: Oral    pantoprazole (PROTONIX) 40 MG EC tablet  90 tablet 1 10/24/2022    [facility] #[id number] [facility], [address]    Sig: TAKE 1 TABLET BY MOUTH EVERY DAY FOR REFLUX    Class: E-Prescribe    sildenafil (REVATIO) 20 MG tablet  30 tablet 4 2/14/2023    [id number], [place] - [address]    Sig: TAKE 1-5 TABS BY MOUTH DAILY AS NEEDED FOR ED. TAKE 1-3 HRS BE4 INTERCOURSE. MAX 1 DOSE/24HRS    Class: E-Prescribe    No prior authorization was found for this prescription.    Found prior authorization for another prescription for the same medication: Canceled - Other (The medication order is discontinued.)     Renewals     Renewal provider: Allan Cardona MD          valACYclovir (VALTREX) 500 MG tablet  20 tablet 0 10/24/2022    [id number] - [place], [place] - [address] AT [facility]    Sig: TAKE 1 TABLET BY MOUTH EVERY 12 HOURS FOR 5 DAYS. START AT ONSET OF SYMPTOMS    Class: E-Prescribe      Clinic-Administered Medications as of 3/20/2023       Dose Frequency Start End    methylPREDNISolone (DEPO-MEDROL) injection 20 mg 20 mg  1/2/2020     Class: E-Prescribe          ALLERGIES:   Gemfibrozil, No clinical screening - see comments, Pilocarpine hydrochloride, and Timoptic [timolol maleate]    ROS:  Negative unless otherwise stated in HPI.    Physical Examination:   VITALS:   There were no vitals taken for this visit.   Deferred secondary to phone visit.     Labs: No relevant labs to review.    Diagnostic studies: CTA 3/16/23 showing patent bilateral iliac and prostatic arteries.     Assessment: 67 year old male presenting for PAE evaluation. IPSS: 17. QOL: 4.     The patient is a good candidate for PAE.     We explained the procedure to the patient. We described how we obtain access into a femoral artery and use fluoroscopy to guide our instruments to both prostatic arteries, from which we deliver embolic particles to the prostate with the goal of inducing tissue ischemia and eventual shrinkage of the prostate. We told the patient that at times we may not be able to move forward with embolization of the prostatic arteries are too small, difficult to cannulate, or there is too high a risk of non-target embolization. In this case, alternative treatment options would need to be pursued with urology.     Furthermore, we discussed the risk of non-target embolization which could result in tissue ischemia to the bladder, penis, and pelvic soft tissues/bones. We also explained the risk of damage to the femoral artery during access which could result in hematoma or pseudoaneurysm.     We  explained that the patient may experience post-embolization syndrome after the procedure which is usually managed with NSAIDs. We usually send patients home with antibiotics as well. We counseled that the patient begins to feel better after about 1 week and then by 1 month he feels significantly better. Typically by 3 months patients will see the extent of their symptom improvement and reach a new baseline.     We explained that PAE does not preculde the patient from undergoing additional urologic interventions should this not provide sufficient symptom relief. There is an approximately 80-85% success rate for this procedure with approximately 20-25% recurrence rate at 5 years. The patient's questions were answered. He was amenable to the plan.     Plan:  -We will schedule the patient for PAE pending insurance pre-authorization.     Maxi Guillen MD  Interventional Radiology Fellow, PGY-6.        I have visited the patient with the resident and agree with the note.          CC  Patient Care Team:  Maxwell Euceda MD as PCP - General (Family Practice)  Maxwell Euceda MD ([facility])  Randolph Noonan MD as Assigned Musculoskeletal Provider  Maxwell Euceda MD as Assigned PCP  Audie Desouza AuD as Audiologist (Audiology)  Easton Silva MD as MD (Urology)  MAXWELL EUCEDA

## 2023-03-21 ENCOUNTER — VIRTUAL VISIT (OUTPATIENT)
Dept: RADIOLOGY | Facility: CLINIC | Age: 68
End: 2023-03-21
Attending: RADIOLOGY
Payer: COMMERCIAL

## 2023-03-21 DIAGNOSIS — N40.1 BENIGN PROSTATIC HYPERPLASIA WITH LOWER URINARY TRACT SYMPTOMS, SYMPTOM DETAILS UNSPECIFIED: ICD-10-CM

## 2023-03-21 PROCEDURE — G0463 HOSPITAL OUTPT CLINIC VISIT: HCPCS | Mod: PN,TEL | Performed by: RADIOLOGY

## 2023-03-21 PROCEDURE — 99204 OFFICE O/P NEW MOD 45 MIN: CPT | Mod: TEL | Performed by: RADIOLOGY

## 2023-03-21 NOTE — NURSING NOTE
Is the patient currently in the state of [place]? YES    Visit mode:TELEPHONE    If the visit is dropped, the patient can be reconnected by: TELEPHONE VISIT: Phone number: [phone]    Will anyone else be joining the visit? NO      How would you like to obtain your AVS? MyChart    Are changes needed to the allergy or medication list? NO    Patient denies any changes since echeck-in regarding medication and allergies and states all information entered during echeck-in remains accurate.    Reason for visit: New Patient Visit

## 2023-03-21 NOTE — LETTER
3/21/2023         RE: Grey Markham  [address]  [place] [address]        Dear Colleague,    Thank you for referring your patient, Grey Markham, to the [facility]. Please see a copy of my visit note below.    Phone-Visit Details    Type of service:  Video Visit    Video Start Time (time video started): 12:27 pm    Video End Time (time video stopped): 12:50 pm    Originating Location (pt. Location): Home        Distant Location (provider location):  On-site    Mode of Communication:  Video Conference via [employer]          INTERVENTIONAL RADIOLOGY CONSULTATION    Name: Grey Markham  Age: 67 year old   Referring Physician: Dr. Euceda   REASON FOR REFERRAL: Prostate artery embolization     HPI: 67 year old male with history of BPH presenting for PAE evaluation.     Per patient, his main symptom is urinary urgency for about 5 years. He has wet the bed twice at night. He has tried alpha blockers but had to stop due to lightheadedness. He has nocturia 2-3 times per night but can be up to 4-5 times. Has no issues with his kidneys, ureters, or bladder.     IPSS: 17  QOL: 4    PAST MEDICAL HISTORY:   Past Medical History:   Diagnosis Date     Coronary atherosclerosis of native coronary artery 12/30/09    10%  mid-LAD lesion, Dr. Mendoza     DDD (degenerative disc disease), lumbar     L-4-L5, L5-S1     GERD (gastroesophageal reflux disease) 11/2007    no Rose's     Glaucoma      Glaucoma      Hyperlipidemia LDL goal <100     hyper-TG, on Simv 80mg since at least 1/4/10     Oral herpes        PAST SURGICAL HISTORY:   Past Surgical History:   Procedure Laterality Date     ARTHROSCOPY KNEE RT/LT Right 02/22/2008    [facility], Dr. Shaista AHDDAD GLAUCOMA SURG,IRIDENCLEISIS  04/01/2003    LASER     C GLAUCOMA SURG,IRIDENCLEISIS  06/01/2006    laser     CARDIAC CATHERIZATION  12/30/2009    10% stenosis of mid LAD, EF 70%, nl otherwise Dr. Elliot Mendoza, [facility]     CATARACT IOL,  RT/LT Right 2016     CATARACT IOL, RT/LT Left 2016     COLONOSCOPY WITH CO2 INSUFFLATION N/A 2018    Procedure: COLONOSCOPY WITH CO2 INSUFFLATION;  Screen for colon cancer;  Surgeon: Navin Vasquez DO;  Location: [facility] OR     COMBINED ESOPHAGOSCOPY, GASTROSCOPY, DUODENOSCOPY (EGD) WITH CO2 INSUFFLATION N/A 2017    Procedure: COMBINED ESOPHAGOSCOPY, GASTROSCOPY, DUODENOSCOPY (EGD) WITH CO2 INSUFFLATION;  EGD-GASTROESOPHAGEAL REFLUX DISEASE WITHOUT ESOPHAGITIS/ DARNELL;  Surgeon: Navin Vasquez DO;  Location: [facility] OR     ESOPHAGOSCOPY, GASTROSCOPY, DUODENOSCOPY (EGD), COMBINED N/A 2017    Procedure: COMBINED ESOPHAGOSCOPY, GASTROSCOPY, DUODENOSCOPY (EGD), BIOPSY SINGLE OR MULTIPLE;;  Surgeon: Navin Vasquez DO;  Location: [facility] OR     GLAUCOMA SURGERY      ahmed valve x 1 le x 2 re     GLAUCOMA SURGERY Right 2015    trabeculectomy with [facility]     HERNIA REPAIR, UMBILICAL  age 6     LASER SURGERY OF EYE      6-7 lasers [facility] glaucoma     LASER SURGERY OF EYE Right 2015    [facility]     PROSTATE BIOPSY, NEEDLE, SATURATION SAMPLING  2014    Dr. Zaragoza       FAMILY HISTORY:   Family History   Problem Relation Age of Onset     Glaucoma Mother 87     Cerebrovascular Disease Mother      Snoring Mother      Lymphoma Father          age 73     Glaucoma Father      Cancer Maternal Grandmother      Liver Cancer Other         aunt     Diabetes Other         mother's relatives     C.A.D. No family hx of      Macular Degeneration No family hx of      Hypertension No family hx of      Thyroid Disease No family hx of      Anesthesia Reaction No family hx of      Bleeding Disorder No family hx of      Thrombophilia No family hx of        SOCIAL HISTORY:   Social History     Tobacco Use     Smoking status: Never     Smokeless tobacco: Never     Tobacco comments:     Less than 100 cigars in his lifetime   Substance Use Topics     Alcohol use: Yes     Comment: weekends       PROBLEM LIST:   Patient  Active Problem List    Diagnosis Date Noted     Essential hypertension with goal blood pressure less than 140/90 02/07/2023     Priority: Medium     Tinnitus, bilateral 08/27/2021     Priority: Medium     Benign prostatic hyperplasia with lower urinary tract symptoms, symptom details unspecified 11/02/2020     Priority: Medium     Combined arterial insufficiency and corporo-venous occlusive erectile dysfunction 12/06/2016     Priority: Medium     Photophobia of both eyes 05/17/2016     Priority: Medium     Dysuria 11/12/2014     Priority: Medium     Obesity, Class I, BMI 30-34.9 10/07/2014     Priority: Medium     Elevated prostate specific antigen (PSA) 10/07/2014     Priority: Medium     Negative biopsy 7/7/16       Glaucoma      Priority: Medium     Imo Update utility       Advance care planning 06/26/2012     Priority: Medium     Advance Care Planning 08/21/2015: Discussed advance care planning with patient; information given to patient to review 08/21/2015  Navin Euceda MD   Advance Care Planning 06/26/2012: Discussed advance care planning with patient; information given to patient to review. 6/26/2012  KIM Melton MA                   Anxiety 06/21/2011     Priority: Medium     Oral herpes      Priority: Medium     Hyperlipidemia LDL goal <100 01/04/2010     Priority: Medium     CAD, hypertriglyceridemia       Atherosclerosis of native coronary artery of native heart without angina pectoris 12/30/2009     Priority: Medium     10%  mid-LAD lesion, Dr. Mendoza       Gastroesophageal reflux disease without esophagitis 11/01/2007     Priority: Medium     no Rose's         MEDICATIONS:   Prescription Medications as of 3/20/2023       Rx Number Disp Refills Start End Last Dispensed Date Next Fill Date Owning Pharmacy    aspirin 81 MG tablet  100 tablet prn 4/1/2011        Sig: Take 1 tablet by mouth daily. *    Class: Historical    Route: Oral    atorvastatin (LIPITOR) 80 MG tablet  90 tablet 1 10/24/2022     [facility] #[id number], [address]    Sig: TAKE 1 TABLET(80 MG) BY MOUTH DAILY FOR CHOLESTEROL Strength: 80 mg    Class: E-Prescribe    Earliest Fill Date: 10/24/2022    finasteride (PROSCAR) 5 MG tablet  90 tablet 1 12/30/2021    [facility] STORE [id number] [place] [address], [address]    Sig: Take 1 tablet (5 mg) by mouth daily Please ensure you make an appointment before this fill runs out.    Class: E-Prescribe    Route: Oral    FLUoxetine 20 MG tablet  90 tablet 1 10/24/2022    [facility] [address] [place] [address], [address]    Sig: Take 1 tablet (20 mg) by mouth daily for anxiety prevention.    Class: E-Prescribe    Route: Oral    No prior authorization was found for this prescription.    Found prior authorization for another prescription for the same medication: Canceled - Other (The medication order is discontinued.)    ketoconazole (NIZORAL) 2 % external cream  30 g 1 10/30/2021    [facility] [address] [place] [address], [address]    Sig: APPLY TOPICALLY TO SEBORRHEA OF FACE TWICE DAILY AS NEEDED    Class: E-Prescribe    lisinopril (ZESTRIL) 20 MG tablet  90 tablet 0 2/7/2023    [facility] STORE #[id number] [facility], [address]    Sig: Take 1 tablet (20 mg) by mouth daily for blood pressure    Class: E-Prescribe    Route: Oral    pantoprazole (PROTONIX) 40 MG EC tablet  90 tablet 1 10/24/2022    [facility] STORE #[id number] [facility], [place] [address] [facility]    Sig: TAKE 1 TABLET BY MOUTH EVERY DAY FOR REFLUX    Class: E-Prescribe    sildenafil (REVATIO) 20 MG tablet  30 tablet 4 2/14/2023    [id number] [place] [place] - [address]    Sig: TAKE 1-5 TABS BY MOUTH DAILY AS NEEDED FOR ED. TAKE 1-3 HRS BE4  INTERCOURSE. MAX 1 DOSE/24HRS    Class: E-Prescribe    No prior authorization was found for this prescription.    Found prior authorization for another prescription for the same medication: Canceled - Other (The medication order is discontinued.)    Renewals     Renewal provider: Allan Cardona MD          valACYclovir (VALTREX) 500 MG tablet  20 tablet 0 10/24/2022    [id number] - [facility] [address] [facility]    Sig: TAKE 1 TABLET BY MOUTH EVERY 12 HOURS FOR 5 DAYS. START AT ONSET OF SYMPTOMS    Class: E-Prescribe      Clinic-Administered Medications as of 3/20/2023       Dose Frequency Start End    methylPREDNISolone (DEPO-MEDROL) injection 20 mg 20 mg  1/2/2020     Class: E-Prescribe          ALLERGIES:   Gemfibrozil, No clinical screening - see comments, Pilocarpine hydrochloride, and Timoptic [timolol maleate]    ROS:  Negative unless otherwise stated in HPI.    Physical Examination:   VITALS:   There were no vitals taken for this visit.   Deferred secondary to phone visit.     Labs: No relevant labs to review.    Diagnostic studies: CTA 3/16/23 showing patent bilateral iliac and prostatic arteries.     Assessment: 67 year old male presenting for PAE evaluation. IPSS: 17. QOL: 4.     The patient is a good candidate for PAE.     We explained the procedure to the patient. We described how we obtain access into a femoral artery and use fluoroscopy to guide our instruments to both prostatic arteries, from which we deliver embolic particles to the prostate with the goal of inducing tissue ischemia and eventual shrinkage of the prostate. We told the patient that at times we may not be able to move forward with embolization of the prostatic arteries are too small, difficult to cannulate, or there is too high a risk of non-target embolization. In this case, alternative treatment options would need to be pursued with urology.     Furthermore, we discussed the risk of  non-target embolization which could result in tissue ischemia to the bladder, penis, and pelvic soft tissues/bones. We also explained the risk of damage to the femoral artery during access which could result in hematoma or pseudoaneurysm.     We explained that the patient may experience post-embolization syndrome after the procedure which is usually managed with NSAIDs. We usually send patients home with antibiotics as well. We counseled that the patient begins to feel better after about 1 week and then by 1 month he feels significantly better. Typically by 3 months patients will see the extent of their symptom improvement and reach a new baseline.     We explained that PAE does not preculde the patient from undergoing additional urologic interventions should this not provide sufficient symptom relief. There is an approximately 80-85% success rate for this procedure with approximately 20-25% recurrence rate at 5 years. The patient's questions were answered. He was amenable to the plan.     Plan:  -We will schedule the patient for PAE pending insurance pre-authorization.     Maxi Guillen MD  Interventional Radiology Fellow, PGY-6.    I have visited the patient with the resident and agree with the note.      Again, thank you for allowing me to participate in the care of your patient.      Sincerely,      Lisbet Loya MD      CC  Patient Care Team:  Navin Euceda MD as PCP - General (Family Practice)  Randolph Noonan MD as Assigned Musculoskeletal Provider  Audie Desouza AuD as Audiologist (Audiology)  Easton Silva MD as MD (Urology)

## 2023-03-22 ENCOUNTER — TEAM CONFERENCE (OUTPATIENT)
Dept: SURGERY | Facility: CLINIC | Age: 68
End: 2023-03-22
Payer: COMMERCIAL

## 2023-03-29 DIAGNOSIS — F41.9 ANXIETY: ICD-10-CM

## 2023-03-29 RX ORDER — FLUOXETINE 20 MG/1
TABLET, FILM COATED ORAL
Qty: 90 TABLET | Refills: 1 | Status: SHIPPED | OUTPATIENT
Start: 2023-03-29 | End: 2023-07-18

## 2023-03-29 NOTE — TELEPHONE ENCOUNTER
Patients Name: Grey Markham     Patients MRN: [id number]     Doctor s Name: Lisbet Loya     Procedure CPT codes:      68369 - Vascular embolization, inclusive of radiological supervision and interpretation        68086 - Angiography; pelvic, selective or supraselective, radiological supervision and interpretation     Procedure name: Prostate Artery Embolization          Diagnosis Codes:   Enlarged prostate with urinary obstruction: N40.1    Vascular disorders of male genitalia: N50.1          Past treatment:     None    Imaging:     CTA: 3/16/2023       Response: Pending

## 2023-04-05 ENCOUNTER — MYC MEDICAL ADVICE (OUTPATIENT)
Dept: FAMILY MEDICINE | Facility: CLINIC | Age: 68
End: 2023-04-05
Payer: COMMERCIAL

## 2023-04-10 ENCOUNTER — TELEPHONE (OUTPATIENT)
Dept: VASCULAR SURGERY | Facility: CLINIC | Age: 68
End: 2023-04-10
Payer: COMMERCIAL

## 2023-04-10 NOTE — TELEPHONE ENCOUNTER
Called patient, no answer. Left VM for patient explaining that per [employer] insurance, the PAE is considered an investigational procedure and therefore not covered. Provided patient with the cost estimate line should he want to self pay. Also provided my direct office line.     Khushboo LLOYD RN, BSN   Interventional Radiology [facility]   [phone]

## 2023-05-11 ENCOUNTER — OFFICE VISIT (OUTPATIENT)
Dept: FAMILY MEDICINE | Facility: CLINIC | Age: 68
End: 2023-05-11
Payer: COMMERCIAL

## 2023-05-11 ENCOUNTER — VIRTUAL VISIT (OUTPATIENT)
Dept: UROLOGY | Facility: CLINIC | Age: 68
End: 2023-05-11
Payer: COMMERCIAL

## 2023-05-11 VITALS
WEIGHT: 211.6 LBS | OXYGEN SATURATION: 98 % | TEMPERATURE: 97.8 F | RESPIRATION RATE: 15 BRPM | BODY MASS INDEX: 30.29 KG/M2 | SYSTOLIC BLOOD PRESSURE: 132 MMHG | HEIGHT: 70 IN | DIASTOLIC BLOOD PRESSURE: 82 MMHG | HEART RATE: 80 BPM

## 2023-05-11 DIAGNOSIS — K21.9 GASTROESOPHAGEAL REFLUX DISEASE WITHOUT ESOPHAGITIS: ICD-10-CM

## 2023-05-11 DIAGNOSIS — I10 ESSENTIAL HYPERTENSION WITH GOAL BLOOD PRESSURE LESS THAN 140/90: Primary | ICD-10-CM

## 2023-05-11 DIAGNOSIS — T88.7XXA MEDICATION SIDE EFFECTS: ICD-10-CM

## 2023-05-11 DIAGNOSIS — N40.1 BENIGN PROSTATIC HYPERPLASIA WITH LOWER URINARY TRACT SYMPTOMS, SYMPTOM DETAILS UNSPECIFIED: Primary | ICD-10-CM

## 2023-05-11 DIAGNOSIS — Z23 NEED FOR VACCINATION: ICD-10-CM

## 2023-05-11 DIAGNOSIS — L57.0 ACTINIC KERATOSIS: ICD-10-CM

## 2023-05-11 PROCEDURE — 91312 COVID-19 BIVALENT 12+ (PFIZER): CPT | Performed by: FAMILY MEDICINE

## 2023-05-11 PROCEDURE — 0124A COVID-19 BIVALENT 12+ (PFIZER): CPT | Performed by: FAMILY MEDICINE

## 2023-05-11 PROCEDURE — 99212 OFFICE O/P EST SF 10 MIN: CPT | Mod: TEL | Performed by: UROLOGY

## 2023-05-11 PROCEDURE — 99214 OFFICE O/P EST MOD 30 MIN: CPT | Mod: 25 | Performed by: FAMILY MEDICINE

## 2023-05-11 RX ORDER — LOSARTAN POTASSIUM 100 MG/1
100 TABLET ORAL DAILY
Qty: 90 TABLET | Refills: 1 | Status: SHIPPED | OUTPATIENT
Start: 2023-05-11 | End: 2023-11-06

## 2023-05-11 ASSESSMENT — PAIN SCALES - GENERAL: PAINLEVEL: NO PAIN (0)

## 2023-05-11 NOTE — PROGRESS NOTES
Grey is a 67 year old who is being evaluated via a billable telephone visit.      What phone number would you like to be contacted at?    How would you like to obtain your AVS? MyChart    Distant Location (provider location):  Off-site        Subjective   Grey is a 67 year old, presenting for the following health issues:  Video Visit    HPI     Patient has LUTS due to BPH.  He can't tolerate meds and does not want surgery.  He has seen Dr. Newton about prostatic embolization.      Review of Systems   Constitutional, HEENT, cardiovascular, pulmonary, gi and gu systems are negative, except as otherwise noted.      Objective           Vitals:  No vitals were obtained today due to virtual visit.    Physical Exam   healthy, alert and no distress  PSYCH: Alert and oriented times 3; coherent speech, normal   rate and volume, able to articulate logical thoughts, able   to abstract reason, no tangential thoughts, no hallucinations   or delusions  His affect is normal  RESP: No cough, no audible wheezing, able to talk in full sentences  Remainder of exam unable to be completed due to telephone visits        BPH: patient to reach out to Dr. Newton about scheduling.        Phone call duration: 5 minutes

## 2023-05-11 NOTE — PATIENT INSTRUCTIONS
At [facility], we strive to deliver an exceptional experience to you, every time we see you. If you receive a survey, please complete it as we do value your feedback.  If you have MyChart, you can expect to receive results automatically within 24 hours of their completion.  Your provider will send a note interpreting your results as well.   If you do not have MyChart, you should receive your results in about a week by mail.    Your care team:                            Family Medicine Internal Medicine   MD Kuldeep Fernandez MD Shantel Branch-Fleming, MD Srinivasa Vaka, MD Katya Belousova, PAPERLA Bird CNP, MD ([facility]   Manuel Winchester, MD Griselda Vaughn MD Amelia Massimini APRN JOESPH Greenberg APRN MD Brook Shelton MD          Clinic hours: Monday - Thursday 7 am-6 pm; Fridays 7 am-5 pm.   Urgent care: Monday - Friday 10 am- 8 pm; Saturday and Sunday 9 am-5 pm.    Clinic: [phone]       [facility]: Monday - Thursday 8 am - 7 pm; Friday 8 am - 6 pm  [facility]: [phone]

## 2023-05-11 NOTE — PROGRESS NOTES
Assessment & Plan     (I10) Essential hypertension with goal blood pressure less than 140/90  (primary encounter diagnosis)  Comment: controlled, but he may have an ACE cough.  Plan: losartan (COZAAR) 100 MG tablet        Discontinue lisinopril. Return in about 10 weeks (around 7/20/2023) for blood pressure check.      (L57.0) Actinic keratosis  Comment: (or whatever the lesion was on his nose) resolved.  Plan: no further treatment needed. He can continue to apply A&D ointment where the fissure in the nostril was.    (K21.9) Gastroesophageal reflux disease without esophagitis  (T88.7XXA) Medication side effects  Comment: besides the possible ACE cough, the patient may have experienced abdominal pains from the full dose of pantoprazole. He is taking a half tab now.  Plan: Consider taking two half tabs daily for a couple weeks to see if the abdominal pain returns (which would differentiate between dose dependent side effect versus side effect of having the medication delayed release).    (Z23) Need for vaccination  Comment:   Plan: COVID-19 BIVALENT 12+ (PFIZER)                Navin Euceda MD  [facility]    Bassam Edmonds is a 67 year old, presenting for the following health issues:  Hypertension        5/11/2023    12:43 PM   Additional Questions   Roomed by Janeth     History of Present Illness       Reason for visit:  Follow up for blood  pressure pill check & ant acid issue    He eats 2-3 servings of fruits and vegetables daily.He consumes 1 sweetened beverage(s) daily.He exercises with enough effort to increase his heart rate 60 or more minutes per day.  He exercises with enough effort to increase his heart rate 4 days per week.   He is taking medications regularly.       Hypertension Follow-up      Do you check your blood pressure regularly outside of the clinic? No     Are you following a low salt diet? Yes    Are your blood pressures ever more than 140 on the top number  "(systolic) OR more   than 90 on the bottom number (diastolic), for example 140/90? No    The patient complains of a dry cough 2-3 x per day that lasts for about 10 minutes, usually at night, which he has had for the past 3-jaycee months since starting lisinopril. The cough is not very bothersome to the patient but he does report that it occurs daily.      Review of Systems   The patient reports abdominal pains in all quadrants of his abdomen (see 4/5/23 encounter). Interestingly, his girlfriend's mom had a similar issue and cut her reflux medication in half. The patient notes that he also tried this and began taking half his normal dose of pantoprazole, which stopped his stomach pains. He also adds that this half-dose has still been able to control his acid reflux.      The patient also reports dizziness when he gets up too quickly.      Objective    /82 (BP Location: Right arm, Patient Position: Chair, Cuff Size: Adult Large)   Pulse 80   Temp 97.8  F (36.6  C) (Oral)   Resp 15   Ht 1.783 m (5' 10.21\")   Wt 96 kg (211 lb 9.6 oz)   SpO2 98%   BMI 30.18 kg/m    Body mass index is 30.18 kg/m .  Physical Exam   GENERAL: healthy, alert and no distress  EYES: Eyes grossly normal to inspection, PERRL, EOMI, sclerae white and conjunctivae normal  MS: no gross musculoskeletal defects noted, no edema  SKIN:  Inside the left nostril, the erythema of the previous fissure is gone, replaced by a line of lighter mucosa where a scar is formed. The external skin of the left ala is normal (the previous lesion is resolved).  NEURO: Normal strength and tone, sensory exam grossly normal, mentation intact, oriented times 3 and cranial nerves 2-12 intact  PSYCH: mentation appears normal, affect normal/bright                     This document serves as a record of the services and decisions personally performed and made by Dr. Euceda. It was created on his behalf by Homer Cuello, a trained medical scribe. The creation of this " document is based the provider's statements to the medical scribe.  Homer Cuello,  4:18 PM

## 2023-05-15 ENCOUNTER — TELEPHONE (OUTPATIENT)
Dept: FAMILY MEDICINE | Facility: CLINIC | Age: 68
End: 2023-05-15
Payer: COMMERCIAL

## 2023-05-15 NOTE — TELEPHONE ENCOUNTER
Can someone let me know exactly what I am able to do about this? (some order in Epic, a letter that states something specific, etc.)

## 2023-05-15 NOTE — TELEPHONE ENCOUNTER
"----- Message -----   From: Grey Markham   Sent: 5/11/2023  10:50 AM CDT   To: [facility] - Primary Care   Subject: Referral Request                                   Grey Markham would like to request a referral.   Reason: submit Prostate Embolization Procedure request to [facility] "[employer]   Requested provider: Lisbet Loya   Comment:   According to Dr. Silva, Dr. Loya's Office  needs to contact  [facility]  to officially request  my Prostate Embolization Procedure. Then assuming they turn it down due to being \"experimental\" (or whatever), according to Wesley @ [facility] ([phone], [facility] can then begin the official appeals/grievance process.   The very simple (FDA approved) Prostate Embolization is my only viable option, as the medications available are no longer viable for me, and the nightmarish TURP procedure is out of the question.   Please begin the process asap, so I can finally get this simple, minimally invasive approach to my medical problem.   Thank you very much, sincerely,   Grey Markham       "

## 2023-05-16 NOTE — TELEPHONE ENCOUNTER
Spoke with Pt and stated his message below was forwarded to Dr. Easton Silva and Dr. Lisbet Loya offices.    LUIS [facility] Referral Rep

## 2023-06-05 DIAGNOSIS — K21.9 GASTROESOPHAGEAL REFLUX DISEASE WITHOUT ESOPHAGITIS: ICD-10-CM

## 2023-06-05 RX ORDER — PANTOPRAZOLE SODIUM 40 MG/1
TABLET, DELAYED RELEASE ORAL
Qty: 90 TABLET | Refills: 1 | Status: SHIPPED | OUTPATIENT
Start: 2023-06-05 | End: 2023-07-18

## 2023-06-09 ENCOUNTER — TELEPHONE (OUTPATIENT)
Dept: VASCULAR SURGERY | Facility: CLINIC | Age: 68
End: 2023-06-09
Payer: COMMERCIAL

## 2023-06-09 DIAGNOSIS — N40.1 BENIGN PROSTATIC HYPERPLASIA WITH LOWER URINARY TRACT SYMPTOMS, SYMPTOM DETAILS UNSPECIFIED: Primary | ICD-10-CM

## 2023-06-09 NOTE — TELEPHONE ENCOUNTER
Date: 6/9/2023    Time of Call: 2:40 PM     Diagnosis:   BPH     [ VORB ] Ordering provider: Dr Lisbet Loya  Order: IR PAE     Order received by: Marcy Mcpherson LPN     Follow-up/additional notes:

## 2023-06-09 NOTE — TELEPHONE ENCOUNTER
PA appeal approved from 5/14/23-5/14/24. Auth #[id number]. Order placed and routed to IR scheduling team.     Marcy Mcpherson LPN

## 2023-06-20 DIAGNOSIS — E78.5 HYPERLIPIDEMIA LDL GOAL <100: ICD-10-CM

## 2023-06-20 DIAGNOSIS — I25.10 ATHEROSCLEROSIS OF NATIVE CORONARY ARTERY OF NATIVE HEART WITHOUT ANGINA PECTORIS: ICD-10-CM

## 2023-06-20 RX ORDER — ATORVASTATIN CALCIUM 80 MG/1
TABLET, FILM COATED ORAL
Qty: 90 TABLET | Refills: 0 | Status: SHIPPED | OUTPATIENT
Start: 2023-06-20 | End: 2023-07-18

## 2023-06-20 NOTE — TELEPHONE ENCOUNTER
Prescription approved per [facility] Refill Protocol.  Ember Vargas, RN  [facility] Triage Nurse

## 2023-06-26 DIAGNOSIS — N40.1 BENIGN PROSTATIC HYPERPLASIA WITH LOWER URINARY TRACT SYMPTOMS, SYMPTOM DETAILS UNSPECIFIED: Primary | ICD-10-CM

## 2023-06-26 RX ORDER — OMEPRAZOLE 20 MG/1
20 TABLET, DELAYED RELEASE ORAL DAILY
Qty: 7 TABLET | Refills: 0 | Status: SHIPPED | OUTPATIENT
Start: 2023-06-26 | End: 2023-07-03

## 2023-06-26 RX ORDER — CIPROFLOXACIN 750 MG/1
750 TABLET, FILM COATED ORAL 2 TIMES DAILY
Qty: 20 TABLET | Refills: 0 | Status: SHIPPED | OUTPATIENT
Start: 2023-06-26 | End: 2023-07-06

## 2023-06-26 RX ORDER — IBUPROFEN 800 MG/1
800 TABLET, FILM COATED ORAL 2 TIMES DAILY
Qty: 14 TABLET | Refills: 0 | Status: SHIPPED | OUTPATIENT
Start: 2023-06-26 | End: 2023-07-03

## 2023-06-28 ENCOUNTER — APPOINTMENT (OUTPATIENT)
Dept: MEDSURG UNIT | Facility: CLINIC | Age: 68
End: 2023-06-28
Attending: RADIOLOGY
Payer: COMMERCIAL

## 2023-06-28 ENCOUNTER — APPOINTMENT (OUTPATIENT)
Dept: INTERVENTIONAL RADIOLOGY/VASCULAR | Facility: CLINIC | Age: 68
End: 2023-06-28
Attending: RADIOLOGY
Payer: COMMERCIAL

## 2023-06-28 ENCOUNTER — HOSPITAL ENCOUNTER (OUTPATIENT)
Facility: CLINIC | Age: 68
Discharge: HOME | End: 2023-06-28
Attending: RADIOLOGY | Admitting: RADIOLOGY
Payer: COMMERCIAL

## 2023-06-28 VITALS
DIASTOLIC BLOOD PRESSURE: 77 MMHG | WEIGHT: 215.39 LBS | TEMPERATURE: 97.7 F | SYSTOLIC BLOOD PRESSURE: 124 MMHG | RESPIRATION RATE: 16 BRPM | HEART RATE: 49 BPM | BODY MASS INDEX: 30.15 KG/M2 | OXYGEN SATURATION: 96 % | HEIGHT: 71 IN

## 2023-06-28 DIAGNOSIS — N40.1 BENIGN PROSTATIC HYPERPLASIA WITH LOWER URINARY TRACT SYMPTOMS, SYMPTOM DETAILS UNSPECIFIED: ICD-10-CM

## 2023-06-28 LAB
ATRIAL RATE - MUSE: 52 BPM
CREAT SERPL-MCNC: 0.91 MG/DL (ref 0.67–1.17)
DIASTOLIC BLOOD PRESSURE - MUSE: NORMAL MMHG
GFR SERPL CREATININE-BSD FRML MDRD: >90 ML/MIN/1.73M2
INR PPP: 1 (ref 0.85–1.15)
INTERPRETATION ECG - MUSE: NORMAL
P AXIS - MUSE: 32 DEGREES
PLATELET # BLD AUTO: 211 10E3/UL (ref 150–450)
PR INTERVAL - MUSE: 174 MS
QRS DURATION - MUSE: 86 MS
QT - MUSE: 404 MS
QTC - MUSE: 375 MS
R AXIS - MUSE: 8 DEGREES
SYSTOLIC BLOOD PRESSURE - MUSE: NORMAL MMHG
T AXIS - MUSE: 10 DEGREES
VENTRICULAR RATE- MUSE: 52 BPM

## 2023-06-28 PROCEDURE — 258N000003 HC RX IP 258 OP 636: Performed by: RADIOLOGY

## 2023-06-28 PROCEDURE — 82565 ASSAY OF CREATININE: CPT | Performed by: NURSE PRACTITIONER

## 2023-06-28 PROCEDURE — 99152 MOD SED SAME PHYS/QHP 5/>YRS: CPT

## 2023-06-28 PROCEDURE — 76937 US GUIDE VASCULAR ACCESS: CPT | Mod: 26 | Performed by: RADIOLOGY

## 2023-06-28 PROCEDURE — 76937 US GUIDE VASCULAR ACCESS: CPT

## 2023-06-28 PROCEDURE — 36247 INS CATH ABD/L-EXT ART 3RD: CPT | Mod: XS | Performed by: RADIOLOGY

## 2023-06-28 PROCEDURE — 272N000280 HC DEVICE COMPRESSION CR5

## 2023-06-28 PROCEDURE — 250N000009 HC RX 250: Performed by: RADIOLOGY

## 2023-06-28 PROCEDURE — C1889 IMPLANT/INSERT DEVICE, NOC: HCPCS

## 2023-06-28 PROCEDURE — 99152 MOD SED SAME PHYS/QHP 5/>YRS: CPT | Mod: GC | Performed by: RADIOLOGY

## 2023-06-28 PROCEDURE — 85049 AUTOMATED PLATELET COUNT: CPT | Performed by: NURSE PRACTITIONER

## 2023-06-28 PROCEDURE — 250N000011 HC RX IP 250 OP 636: Mod: JZ | Performed by: NURSE PRACTITIONER

## 2023-06-28 PROCEDURE — 37243 VASC EMBOLIZE/OCCLUDE ORGAN: CPT | Mod: GC | Performed by: RADIOLOGY

## 2023-06-28 PROCEDURE — 255N000002 HC RX 255 OP 636: Performed by: RADIOLOGY

## 2023-06-28 PROCEDURE — 272N000143 HC KIT CR3

## 2023-06-28 PROCEDURE — 75736 ARTERY X-RAYS PELVIS: CPT | Mod: XU

## 2023-06-28 PROCEDURE — 93005 ELECTROCARDIOGRAM TRACING: CPT

## 2023-06-28 PROCEDURE — 85610 PROTHROMBIN TIME: CPT | Performed by: NURSE PRACTITIONER

## 2023-06-28 PROCEDURE — 999N000142 HC STATISTIC PROCEDURE PREP ONLY

## 2023-06-28 PROCEDURE — C1769 GUIDE WIRE: HCPCS

## 2023-06-28 PROCEDURE — 999N000133 HC STATISTIC PP CARE STAGE 2

## 2023-06-28 PROCEDURE — 258N000003 HC RX IP 258 OP 636: Performed by: NURSE PRACTITIONER

## 2023-06-28 PROCEDURE — 250N000011 HC RX IP 250 OP 636: Performed by: RADIOLOGY

## 2023-06-28 PROCEDURE — C1887 CATHETER, GUIDING: HCPCS

## 2023-06-28 PROCEDURE — 272N000192 HC ACCESSORY CR2

## 2023-06-28 PROCEDURE — 36247 INS CATH ABD/L-EXT ART 3RD: CPT | Mod: 50

## 2023-06-28 PROCEDURE — 36415 COLL VENOUS BLD VENIPUNCTURE: CPT | Performed by: NURSE PRACTITIONER

## 2023-06-28 RX ORDER — LIDOCAINE 40 MG/G
CREAM TOPICAL
Status: DISCONTINUED | OUTPATIENT
Start: 2023-06-28 | End: 2023-06-28

## 2023-06-28 RX ORDER — FENTANYL CITRATE 50 UG/ML
25-50 INJECTION, SOLUTION INTRAMUSCULAR; INTRAVENOUS EVERY 5 MIN PRN
Status: DISCONTINUED | OUTPATIENT
Start: 2023-06-28 | End: 2023-06-28

## 2023-06-28 RX ORDER — SODIUM CHLORIDE 9 MG/ML
INJECTION, SOLUTION INTRAVENOUS CONTINUOUS
Status: DISCONTINUED | OUTPATIENT
Start: 2023-06-28 | End: 2023-06-28

## 2023-06-28 RX ORDER — METHYLPREDNISOLONE 4 MG
TABLET, DOSE PACK ORAL
Qty: 21 TABLET | Refills: 0 | Status: SHIPPED | OUTPATIENT
Start: 2023-06-28 | End: 2023-07-18

## 2023-06-28 RX ORDER — NITROGLYCERIN 5 MG/ML
100-500 VIAL (ML) INTRAVENOUS
Status: COMPLETED | OUTPATIENT
Start: 2023-06-28 | End: 2023-06-28

## 2023-06-28 RX ORDER — IBUPROFEN 800 MG/1
800 TABLET, FILM COATED ORAL EVERY 6 HOURS
Qty: 28 TABLET | Refills: 0 | Status: SHIPPED | OUTPATIENT
Start: 2023-06-28 | End: 2023-07-05

## 2023-06-28 RX ORDER — NALOXONE HYDROCHLORIDE 0.4 MG/ML
0.4 INJECTION, SOLUTION INTRAMUSCULAR; INTRAVENOUS; SUBCUTANEOUS
Status: DISCONTINUED | OUTPATIENT
Start: 2023-06-28 | End: 2023-06-28

## 2023-06-28 RX ORDER — HEPARIN SODIUM 200 [USP'U]/100ML
1 INJECTION, SOLUTION INTRAVENOUS CONTINUOUS PRN
Status: DISCONTINUED | OUTPATIENT
Start: 2023-06-28 | End: 2023-06-28

## 2023-06-28 RX ORDER — NALOXONE HYDROCHLORIDE 0.4 MG/ML
0.2 INJECTION, SOLUTION INTRAMUSCULAR; INTRAVENOUS; SUBCUTANEOUS
Status: DISCONTINUED | OUTPATIENT
Start: 2023-06-28 | End: 2023-06-28

## 2023-06-28 RX ORDER — IODIXANOL 320 MG/ML
100 INJECTION, SOLUTION INTRAVASCULAR ONCE
Status: COMPLETED | OUTPATIENT
Start: 2023-06-28 | End: 2023-06-28

## 2023-06-28 RX ORDER — OXYBUTYNIN CHLORIDE 5 MG/1
5 TABLET ORAL 3 TIMES DAILY PRN
Qty: 30 TABLET | Refills: 0 | Status: SHIPPED | OUTPATIENT
Start: 2023-06-28 | End: 2023-07-18

## 2023-06-28 RX ORDER — FLUMAZENIL 0.1 MG/ML
0.2 INJECTION, SOLUTION INTRAVENOUS
Status: DISCONTINUED | OUTPATIENT
Start: 2023-06-28 | End: 2023-06-28

## 2023-06-28 RX ADMIN — MIDAZOLAM 0.5 MG: 1 INJECTION INTRAMUSCULAR; INTRAVENOUS at 10:58

## 2023-06-28 RX ADMIN — NITROGLYCERIN 100 MCG: 20 INJECTION INTRAVENOUS at 10:48

## 2023-06-28 RX ADMIN — MIDAZOLAM 0.5 MG: 1 INJECTION INTRAMUSCULAR; INTRAVENOUS at 09:20

## 2023-06-28 RX ADMIN — SODIUM CHLORIDE: 9 INJECTION, SOLUTION INTRAVENOUS at 07:55

## 2023-06-28 RX ADMIN — HEPARIN SODIUM: 1000 INJECTION INTRAVENOUS; SUBCUTANEOUS at 09:40

## 2023-06-28 RX ADMIN — FENTANYL CITRATE 25 MCG: 50 INJECTION, SOLUTION INTRAMUSCULAR; INTRAVENOUS at 09:20

## 2023-06-28 RX ADMIN — FENTANYL CITRATE 25 MCG: 50 INJECTION, SOLUTION INTRAMUSCULAR; INTRAVENOUS at 09:42

## 2023-06-28 RX ADMIN — NITROGLYCERIN 150 MCG: 20 INJECTION INTRAVENOUS at 10:16

## 2023-06-28 RX ADMIN — FENTANYL CITRATE 25 MCG: 50 INJECTION, SOLUTION INTRAMUSCULAR; INTRAVENOUS at 10:27

## 2023-06-28 RX ADMIN — MIDAZOLAM 0.5 MG: 1 INJECTION INTRAMUSCULAR; INTRAVENOUS at 10:26

## 2023-06-28 RX ADMIN — IODIXANOL 110 ML: 320 INJECTION, SOLUTION INTRAVASCULAR at 11:14

## 2023-06-28 RX ADMIN — LIDOCAINE HYDROCHLORIDE 1 ML: 10 INJECTION, SOLUTION EPIDURAL; INFILTRATION; INTRACAUDAL; PERINEURAL at 09:36

## 2023-06-28 RX ADMIN — MIDAZOLAM 0.5 MG: 1 INJECTION INTRAMUSCULAR; INTRAVENOUS at 09:42

## 2023-06-28 RX ADMIN — HEPARIN SODIUM 3 BAG: 200 INJECTION, SOLUTION INTRAVENOUS at 09:08

## 2023-06-28 RX ADMIN — FENTANYL CITRATE 25 MCG: 50 INJECTION, SOLUTION INTRAMUSCULAR; INTRAVENOUS at 09:35

## 2023-06-28 RX ADMIN — MIDAZOLAM 0.5 MG: 1 INJECTION INTRAMUSCULAR; INTRAVENOUS at 09:35

## 2023-06-28 RX ADMIN — FENTANYL CITRATE 25 MCG: 50 INJECTION, SOLUTION INTRAMUSCULAR; INTRAVENOUS at 10:57

## 2023-06-28 ASSESSMENT — ACTIVITIES OF DAILY LIVING (ADL)
ADLS_ACUITY_SCORE: 35

## 2023-06-28 NOTE — PROGRESS NOTES
Pt arrived to unit 2A s/p PAE via L radial artery. L TR band WNL. CMS intact. VSS, pt denies pain and is tolerating PO intake. Will continue to monitor.

## 2023-06-28 NOTE — PROGRESS NOTES
Pt has tolerated PO. Pt ambulated in cruz with steady gait. Pt has voided x2. Left wrist site CDI, soft, flat, non tender. Discharge instructions have been reviewed with pt by HOLLY [place], pt reports he doesn't have any questions. PIV discontinued. Pt transported to discharge pharmacy where pt picked up prescriptions, pt already had ibuprofen filled so opted out of getting that one filled again. Pt's significant other has arrived, pt transported to vehicle via wheelchair.

## 2023-06-28 NOTE — PRE-PROCEDURE
GENERAL PRE-PROCEDURE:   Procedure:  PAE  Date/Time:  6/28/2023 7:34 AM    Verbal consent obtained?: Yes    Written consent obtained?: Yes    Risks and benefits: Risks, benefits and alternatives were discussed    Consent given by:  Patient  Patient states understanding of procedure being performed: Yes    Patient's understanding of procedure matches consent: Yes    Procedure consent matches procedure scheduled: Yes    Expected level of sedation:  Moderate  Appropriately NPO:  Yes  Mallampati  :  Grade 3- soft palate visible, posterior pharyngeal wall not visible  Lungs:  Lungs clear with good breath sounds bilaterally  Heart:  Normal heart sounds and rate  History & Physical reviewed:  History and physical reviewed and no updates needed  Statement of review:  I have reviewed the lab findings, diagnostic data, medications, and the plan for sedation    
No
No

## 2023-06-28 NOTE — PROGRESS NOTES
Pre procedure complete. Awaiting lab results. VSS, pt denies pain and is appropriately NPO. Michael groin prepped. Pedal pulses marked. Carmencita (HERNAN) will transport home post procedure.

## 2023-06-28 NOTE — IR NOTE
Patient Name: Grey Markham  Medical Record Number: [id number]  Today's Date: 6/28/2023    Procedure: prostate artery embolization  Proceduralist: Gaston LOCKWOOD, Vincenzo LOCKWOOD  Pathology present: N/A  Brief completed: N/A    Procedure Start: 0934  Procedure end: 1104  Sedation medications administered: 2.5 mg of versed, 125 mcg of fentanyl  Sedation time:  104 minutes (0840-4562)    Report given to: Luisa RAUSCH RN  : N/A    Left radial site accessed at 0939. TR Band placed on left radial site at 1104 with 11 mL of air.     Other Notes: Pt arrived to IR room 01 from . Consent reviewed. Pt denies any questions or concerns regarding procedure. Pt positioned supine and monitored per protocol. Pt tolerated procedure without any noted complications. Pt transferred back to .

## 2023-06-28 NOTE — DISCHARGE INSTRUCTIONS
[facility]   Interventional Radiology  Discharge Instructions Post Prostate Artery Embolization     AFTER YOU GO HOME       Do relax and take it easy for 24 hours.       Do drink plenty of fluids.       Do resume your regular diet, unless otherwise instructed by your Primary Physician.       DO NOT smoke for at least 24 hours, if you were given any sedation.       DO NOT drink alcoholic beverages the day of your procedure.       Do not drive or operate machinery at home or at work for 24 hours.          DO NOT do any strenuous exercise or lifting for at least 2 days following your Procedure.       DO NOT take a bath or shower for 24 hours following your procedure.       DO NOT submerge site in tub bath, hot tub or swimming pool for 5 days following your procedure.        DO NOT make any important or legal decisions for 24 hours following your procedure.    Care of wrist or arm site  It is normal to have soreness at the puncture site and mild tingling in your hand for up to 3 days.  For 2 days, do not use your hand or arm to support your weight (such as rising from a chair) or bend your wrist (such as lifting a garage door).  For 2 days, do not do any strenuous exercise, do not lift more than 5 pounds or exercise your arm (tennis, golf or bowling).  No lotion or powder to the puncture site for 3 days  If you start bleeding from the site in your arm:  Sit down and press firmly on the site with your fingers for 10 minutes. Call your doctor as soon as you can.  If the bleeding stops, sit still and keep your wrist straight for 2 hours.  Call 911 right away if you have: Heavy bleeding, bleeding that does not stop.    CALL THE PHYSICIAN IF:      - You start bleeding from the procedure site.       - You develop numbness, coolness or a change in color of the arm or leg that was punctured.      - You experience increased pain or redness at the puncture site.      - You develop hives or a rash or  unexplained itching.      - You develop a temperature of 101 degrees F or greater      [facility] INTERVENTIONAL RADIOLOGY DEPARTMENT         Procedure Physician: Dr. Watson                              Date of Procedure: June 28, 2023       Telephone Numbers:   [phone]      Monday-Friday 7:30 am to 4:00 pm                                        [phone].....After 4:00 pm Monday-Friday, Weekends and  Holidays. Ask for the Interventional Radiologist on call. Someone is on call 24 hrs/day.

## 2023-07-06 ENCOUNTER — TELEPHONE (OUTPATIENT)
Dept: VASCULAR SURGERY | Facility: CLINIC | Age: 68
End: 2023-07-06
Payer: COMMERCIAL

## 2023-07-06 NOTE — TELEPHONE ENCOUNTER
Called patient, no answer. VM left requesting a call back to discuss his PAE procedure and plan for one month follow up. Provided patient with direct office line.     Khushboo LLOYD RN, BSN   Interventional Radiology [facility]   [phone]

## 2023-07-17 DIAGNOSIS — N40.1 BENIGN PROSTATIC HYPERPLASIA WITH LOWER URINARY TRACT SYMPTOMS, SYMPTOM DETAILS UNSPECIFIED: ICD-10-CM

## 2023-07-18 ENCOUNTER — OFFICE VISIT (OUTPATIENT)
Dept: FAMILY MEDICINE | Facility: CLINIC | Age: 68
End: 2023-07-18
Payer: COMMERCIAL

## 2023-07-18 VITALS
BODY MASS INDEX: 30.63 KG/M2 | RESPIRATION RATE: 12 BRPM | WEIGHT: 218.8 LBS | OXYGEN SATURATION: 97 % | DIASTOLIC BLOOD PRESSURE: 84 MMHG | HEART RATE: 78 BPM | HEIGHT: 71 IN | TEMPERATURE: 98 F | SYSTOLIC BLOOD PRESSURE: 134 MMHG

## 2023-07-18 DIAGNOSIS — E78.5 HYPERLIPIDEMIA LDL GOAL <100: ICD-10-CM

## 2023-07-18 DIAGNOSIS — I10 ESSENTIAL HYPERTENSION WITH GOAL BLOOD PRESSURE LESS THAN 140/90: Primary | ICD-10-CM

## 2023-07-18 DIAGNOSIS — K21.9 GASTROESOPHAGEAL REFLUX DISEASE WITHOUT ESOPHAGITIS: ICD-10-CM

## 2023-07-18 DIAGNOSIS — I25.10 ATHEROSCLEROSIS OF NATIVE CORONARY ARTERY OF NATIVE HEART WITHOUT ANGINA PECTORIS: ICD-10-CM

## 2023-07-18 DIAGNOSIS — F41.9 ANXIETY: ICD-10-CM

## 2023-07-18 LAB
ALT SERPL W P-5'-P-CCNC: 41 U/L (ref 0–70)
CHOLEST SERPL-MCNC: 197 MG/DL
CREAT SERPL-MCNC: 0.95 MG/DL (ref 0.67–1.17)
GFR SERPL CREATININE-BSD FRML MDRD: 88 ML/MIN/1.73M2
HDLC SERPL-MCNC: 56 MG/DL
LDLC SERPL CALC-MCNC: ABNORMAL MG/DL
NONHDLC SERPL-MCNC: 141 MG/DL
POTASSIUM SERPL-SCNC: 4 MMOL/L (ref 3.4–5.3)
TRIGL SERPL-MCNC: 490 MG/DL

## 2023-07-18 PROCEDURE — 36415 COLL VENOUS BLD VENIPUNCTURE: CPT | Performed by: FAMILY MEDICINE

## 2023-07-18 PROCEDURE — 84460 ALANINE AMINO (ALT) (SGPT): CPT | Performed by: FAMILY MEDICINE

## 2023-07-18 PROCEDURE — 80061 LIPID PANEL: CPT | Performed by: FAMILY MEDICINE

## 2023-07-18 PROCEDURE — 84132 ASSAY OF SERUM POTASSIUM: CPT | Performed by: FAMILY MEDICINE

## 2023-07-18 PROCEDURE — 83721 ASSAY OF BLOOD LIPOPROTEIN: CPT | Mod: 59 | Performed by: FAMILY MEDICINE

## 2023-07-18 PROCEDURE — 82565 ASSAY OF CREATININE: CPT | Performed by: FAMILY MEDICINE

## 2023-07-18 PROCEDURE — 99214 OFFICE O/P EST MOD 30 MIN: CPT | Mod: 25 | Performed by: FAMILY MEDICINE

## 2023-07-18 RX ORDER — ATORVASTATIN CALCIUM 80 MG/1
80 TABLET, FILM COATED ORAL DAILY
Qty: 90 TABLET | Refills: 0 | Status: SHIPPED | OUTPATIENT
Start: 2023-07-18 | End: 2023-09-18

## 2023-07-18 RX ORDER — FLUOXETINE 20 MG/1
20 TABLET, FILM COATED ORAL DAILY
Qty: 90 TABLET | Refills: 1 | Status: SHIPPED | OUTPATIENT
Start: 2023-07-18 | End: 2024-01-16

## 2023-07-18 RX ORDER — PANTOPRAZOLE SODIUM 20 MG/1
20 TABLET, DELAYED RELEASE ORAL DAILY
Qty: 90 TABLET | Refills: 3 | Status: SHIPPED | OUTPATIENT
Start: 2023-07-18 | End: 2024-07-15

## 2023-07-18 RX ORDER — OMEPRAZOLE 20 MG/1
20 TABLET, DELAYED RELEASE ORAL DAILY
Qty: 7 TABLET | Refills: 0 | OUTPATIENT
Start: 2023-07-18

## 2023-07-18 ASSESSMENT — PAIN SCALES - GENERAL: PAINLEVEL: MILD PAIN (2)

## 2023-07-18 NOTE — PROGRESS NOTES
Assessment & Plan     (I10) Essential hypertension with goal blood pressure less than 140/90  (primary encounter diagnosis)  Comment: well controlled after the switch from lisinopril to losartan. He reports he no longer has an ACE cough.  Plan: Creatinine, Potassium        Return in about 4 months (around 11/11/2023) for Medicare annual wellness exam, blood pressure check.      (E78.5) Hyperlipidemia LDL goal <100  (I25.10) Atherosclerosis of native coronary artery of native heart without angina pectoris  Comment: clinically stable on a high-intensity statin   Plan: ALT, Lipid panel reflex to direct LDL         Non-fasting, atorvastatin (LIPITOR) 80 MG         tablet        Return in about 4 months (around 11/11/2023) for Medicare annual wellness exam, blood pressure check.      (K21.9) Gastroesophageal reflux disease without esophagitis  Comment: patient confirms that he gets GI upset when he takes the 40 mg dose, so I will change the pill size for him.  Plan: pantoprazole (PROTONIX) 20 MG EC tablet            (F41.9) Anxiety  Comment: prescription update   Plan: FLUoxetine 20 MG tablet                    Navin Euceda MD  [facility] is a 67 year old, presenting for the following health issues:  Recheck Medication        7/18/2023     4:04 PM   Additional Questions   Roomed by Lorraine     History of Present Illness       Hypertension: He presents for follow up of hypertension.  He does not check blood pressure  regularly outside of the clinic. Outpatient blood pressures have not been over 140/90. He does not follow a low salt diet.     He eats 2-3 servings of fruits and vegetables daily.He consumes 1 sweetened beverage(s) daily.He exercises with enough effort to increase his heart rate 30 to 60 minutes per day.  He exercises with enough effort to increase his heart rate 5 days per week.   He is taking medications regularly.     The patient reports that he no  "longer has a dry cough once he was switched off of lisinopril.    Concern - upper back pain  Onset: one day  Description: felt pain this morning after doing yard work yesterday  Intensity: mild  Progression of Symptoms:  same  Accompanying Signs & Symptoms: chest pain  Previous history of similar problem: none  Precipitating factors:        Worsened by: N/A certain movements   Alleviating factors:        Improved by: in the past pills for back pain have helped  Therapies tried and outcome: Pain pills for back pain helped in the past    Patient describes the pain as \"no big deal\" but thought it was worth it to bring up.    GERD/Heartburn  Onset/Duration: ongoing  Description: wants to change the dose of pantoprazole - 40 mg of pantoprazole has been causing stomach pain, has been taking 20 mg instead with no pain. 20 mg has also seemed to be working well to control the GERD.  Intensity: moderate pain once or twice a month  Progression of Symptoms: improving  Accompanying Signs & Symptoms:  Does it feel like food gets stuck or trouble swallowing: No  Nausea: No  Vomiting (bloody?): No  Abdominal Pain: No  Black-Tarry stools: No  Bloody stools: No  History:  Previous similar episodes: YES  Previous ulcers: No  Precipitating factors:   Caffeine use: limited to once a day or less  Alcohol use: YES  NSAID/Aspirin use: YES  Tobacco use: No  Worse with certain foods .  Alleviating factors: Prescription  Therapies tried and outcome:             Lifestyle changes: change amount of food in the evening            Medications: Protonix        Review of Systems         Objective    /84 (BP Location: Left arm, Patient Position: Chair, Cuff Size: Adult Large)   Pulse 78   Temp 98  F (36.7  C) (Oral)   Resp 12   Ht 1.803 m (5' 11\")   Wt 99.2 kg (218 lb 12.8 oz)   SpO2 97%   BMI 30.52 kg/m    Body mass index is 30.52 kg/m .  Physical Exam   GENERAL: healthy, alert and no distress  EYES: Eyes grossly normal to inspection, " PERRL, EOMI, sclerae white and conjunctivae normal  MS: no gross musculoskeletal defects noted, no edema  SKIN: no suspicious lesions or rashes to visible skin  NEURO: Normal strength and tone, sensory exam grossly normal, mentation intact, oriented times 3 and cranial nerves 2-12 intact  PSYCH: mentation appears normal, affect normal/bright        Latest Reference Range & Units 02/07/23 12:28   Sodium 133 - 144 mmol/L 141   Potassium 3.4 - 5.3 mmol/L 4.9   Chloride 94 - 109 mmol/L 111 (H)   Carbon Dioxide 20 - 32 mmol/L 27   Urea Nitrogen 7 - 30 mg/dL 16   Creatinine 0.66 - 1.25 mg/dL 0.98   GFR Estimate >60 mL/min/1.73m2 85   Calcium 8.5 - 10.1 mg/dL 9.0   Anion Gap 3 - 14 mmol/L 3   Glucose 70 - 99 mg/dL 96   (H): Data is abnormally high              This document serves as a record of the services and decisions personally performed and made by Dr. Euceda. It was created on his behalf by Homer Cuello, a trained medical scribe. The creation of this document is based the provider's statements to the medical scribe.  Homer Cuello,  4:56 PM

## 2023-07-19 LAB — LDLC SERPL DIRECT ASSAY-MCNC: 76 MG/DL

## 2023-08-01 ENCOUNTER — VIRTUAL VISIT (OUTPATIENT)
Dept: VASCULAR SURGERY | Facility: CLINIC | Age: 68
End: 2023-08-01
Payer: COMMERCIAL

## 2023-08-01 VITALS — WEIGHT: 218 LBS | BODY MASS INDEX: 30.52 KG/M2 | HEIGHT: 71 IN

## 2023-08-01 DIAGNOSIS — N40.0 BPH (BENIGN PROSTATIC HYPERPLASIA): Primary | ICD-10-CM

## 2023-08-01 PROCEDURE — 99213 OFFICE O/P EST LOW 20 MIN: CPT | Mod: 93 | Performed by: RADIOLOGY

## 2023-08-01 ASSESSMENT — PAIN SCALES - GENERAL: PAINLEVEL: NO PAIN (0)

## 2023-08-01 NOTE — LETTER
8/1/2023       RE: Grey Markham  [address]  [place] [address]       Dear Colleague,    Thank you for referring your patient, Grey Markham, to the [facility] [place] at [facility]. Please see a copy of my visit note below.    [facility] Vascular      Type of Visit: Virtual: Telephone    Patient is here for a post-op visit to discuss  PAE procedure .     Vitals - Patient Reported  Pain Score: No Pain (0)      Questions patient would like addressed today are: Patient verbalized no questions/concerns, this has been communicated to the provider.     Refills are needed: No    How would you like to obtain your AVS? MyChart        Virtual Visit Details    Type of service:  Telephone Visit   Phone call duration: 6 minutes         HPI: 67 year old male with history of BPH presenting for 1-month post PAE.      He did well post procedure. No complain other than a feeling of pulse in the Biceps in the left arm (Left radial approach in this patient was performed).    The patient symptoms have improved significantly.     IPSS: (1,2,1,3,0,0,2)=9 coming down from 17. Without prostate meds  QOL: 2 coming down from 4      Current Outpatient Medications   Medication    aspirin 81 MG tablet    atorvastatin (LIPITOR) 80 MG tablet    FLUoxetine 20 MG tablet    ketoconazole (NIZORAL) 2 % external cream    losartan (COZAAR) 100 MG tablet    pantoprazole (PROTONIX) 20 MG EC tablet    sildenafil (REVATIO) 20 MG tablet    valACYclovir (VALTREX) 500 MG tablet     Current Facility-Administered Medications   Medication    methylPREDNISolone (DEPO-MEDROL) injection 20 mg       Impression:    Good clinical results post PAE at 1 month. I will see him for his 3-month follow up.        Again, thank you for allowing me to participate in the care of your patient.      Sincerely,    Lisbet Loya MD

## 2023-08-01 NOTE — PROGRESS NOTES
[facility] Vascular      Type of Visit: Virtual: Telephone    Patient is here for a post-op visit to discuss  PAE procedure .     Vitals - Patient Reported  Pain Score: No Pain (0)      Questions patient would like addressed today are: Patient verbalized no questions/concerns, this has been communicated to the provider.     Refills are needed: No    How would you like to obtain your AVS? Gavi        Virtual Visit Details    Type of service:  Telephone Visit   Phone call duration: 6 minutes         HPI: 67 year old male with history of BPH presenting for 1-month post PAE.      He did well post procedure. No complain other than a feeling of pulse in the Biceps in the left arm (Left radial approach in this patient was performed).    The patient symptoms have improved significantly.     IPSS: (1,2,1,3,0,0,2)=9 coming down from 17. Without prostate meds  QOL: 2 coming down from 4      Current Outpatient Medications   Medication    aspirin 81 MG tablet    atorvastatin (LIPITOR) 80 MG tablet    FLUoxetine 20 MG tablet    ketoconazole (NIZORAL) 2 % external cream    losartan (COZAAR) 100 MG tablet    pantoprazole (PROTONIX) 20 MG EC tablet    sildenafil (REVATIO) 20 MG tablet    valACYclovir (VALTREX) 500 MG tablet     Current Facility-Administered Medications   Medication    methylPREDNISolone (DEPO-MEDROL) injection 20 mg       Impression:    Good clinical results post PAE at 1 month. I will see him for his 3-month follow up.

## 2023-08-01 NOTE — NURSING NOTE
Chief Complaint   Patient presents with    Follow Up     1 month follow up PAE procedure, no updates per pt. Medications/allergies reviewed with pt, no changes per pt.        Is the patient currently in the state of [place]? YES    Visit mode:TELEPHONE    If the visit is dropped, the patient can be reconnected by: TELEPHONE VISIT: Phone number: [phone]    Will anyone else be joining the visit? NO      How would you like to obtain your AVS? MyChart    Are changes needed to the allergy or medication list? NO    Patient denies any changes since check-in regarding medication and allergies and states all information entered during check-in remains accurate.    Ramón Zuleta, Visit Facilitator/MA.

## 2023-08-01 NOTE — LETTER
July 26, 2023      TO: Grey Markham  [address]  [place] [address]         Dear Grey,    I will be mailing out an IPSS sheet for your next follow up appointment with Dr. Loya scheduled for:     Appointment details:  Tuesday, 08/01/2023  at 09:10 AM - Telephone visit    Please make sure to fill it out prior to the appointment. Dr. Loya likes to use this as a measurement of how you are doing post PAE.     You do not need to mail this back to me, only b/c we will ask you the numbers at the follow up appointment so please make sure to have this form handy on the date of your appointment.     Please let me know if there are any other questions.     Sincerely     Khushboo SMITH RN, BSN  Interventional Radiology/Vascular  Nurse Coordinator   Phone: [phone]  Fax: [phone]

## 2023-09-13 DIAGNOSIS — B00.2 HERPETIC GINGIVOSTOMATITIS: ICD-10-CM

## 2023-09-13 RX ORDER — VALACYCLOVIR HYDROCHLORIDE 500 MG/1
TABLET, FILM COATED ORAL
Qty: 20 TABLET | Refills: 0 | Status: SHIPPED | OUTPATIENT
Start: 2023-09-13

## 2023-09-13 NOTE — TELEPHONE ENCOUNTER
Prescription approved per [facility] Refill Protocol.  Ember Vargas, RN  [facility] Triage Nurse

## 2023-09-17 DIAGNOSIS — E78.5 HYPERLIPIDEMIA LDL GOAL <100: ICD-10-CM

## 2023-09-17 DIAGNOSIS — I25.10 ATHEROSCLEROSIS OF NATIVE CORONARY ARTERY OF NATIVE HEART WITHOUT ANGINA PECTORIS: ICD-10-CM

## 2023-09-18 RX ORDER — ATORVASTATIN CALCIUM 80 MG/1
80 TABLET, FILM COATED ORAL DAILY
Qty: 90 TABLET | Refills: 0 | Status: SHIPPED | OUTPATIENT
Start: 2023-09-18 | End: 2023-12-18

## 2023-11-06 DIAGNOSIS — I10 ESSENTIAL HYPERTENSION WITH GOAL BLOOD PRESSURE LESS THAN 140/90: ICD-10-CM

## 2023-11-06 RX ORDER — LOSARTAN POTASSIUM 100 MG/1
TABLET ORAL
Qty: 90 TABLET | Refills: 0 | Status: SHIPPED | OUTPATIENT
Start: 2023-11-06 | End: 2024-01-16

## 2023-11-13 NOTE — PROGRESS NOTES
"[facility] Vascular      Type of Visit: Virtual: Telephone    Patient is here for a return visit to discuss  3 month follow up PAE procedure .     Ht 1.803 m (5' 11\")   Wt 97.5 kg (215 lb)   BMI 29.99 kg/m        Questions patient would like addressed today are: Patient verbalized no questions/concerns, this has been communicated to the provider.     Refills are needed: No    How would you like to obtain your AVS? Gavi Castillo Colling    Virtual Visit Details    Type of service:  Telephone Visit   Phone call duration: 7 minutes     INTERVENTIONAL RADIOLOGY CONSULTATION    Name: Grey Markham  Age: 68 year old   Referring Physician: Dr. Gutierrez ref. provider found   REASON FOR FOLLOW UP: Follow up PAE  6/28/2023    HPI: Patient is a 66 yo M with pmh BPH, s/p PAE 6/28/2023.  Since last follow up generally he has been well, but for the last 3 months, he feels that he has an episode about once or twice a week where he goes to the bathroom and then about 30 minutes later he feels an urgency. He is not on any medications for BPH. He does not drink any coffee, but has a couple sodas.    IPSS today is  9 (1,3,1,2,1,0,1),  pre-PAE 17, at 1 month follow up 9  QOL: 2, down from 4 pre-procedure.      ROS:  Negative unless otherwise stated in HPI.      Physical Examination:   VITALS:   There were no vitals taken for this visit.  Physical exam deferred for phone visit.      Labs:    BMP RESULTS:  Lab Results   Component Value Date     02/07/2023     08/14/2020    POTASSIUM 4.0 07/18/2023    POTASSIUM 4.9 02/07/2023    POTASSIUM 4.6 08/14/2020    CHLORIDE 111 (H) 02/07/2023    CHLORIDE 109 08/14/2020    CO2 27 02/07/2023    CO2 27 08/14/2020    ANIONGAP 3 02/07/2023    ANIONGAP 5 08/14/2020    GLC 96 02/07/2023     (H) 08/14/2020    BUN 16 02/07/2023    BUN 16 08/14/2020    CR 0.95 07/18/2023    CR 0.99 08/14/2020    GFRESTIMATED 88 07/18/2023    GFRESTIMATED 80 08/14/2020    GFRESTBLACK >90 08/14/2020    " LOBO 9.0 02/07/2023    LOBO 8.8 08/14/2020        CBC RESULTS:  Lab Results   Component Value Date    WBC 6.5 01/28/2017    RBC 4.82 01/28/2017    HGB 14.3 01/28/2017    HCT 42.8 01/28/2017    MCV 89 01/28/2017    MCH 29.7 01/28/2017    MCHC 33.4 01/28/2017    RDW 13.1 01/28/2017     06/28/2023     01/28/2017       INR/PTT:  Lab Results   Component Value Date    INR 1.00 06/28/2023       Diagnostic studies:   No new diagnostic studies.    Assessment   Patient is a 68 yo M with pmh BPH, s/p PAE 6/28/2023. He is doing well with stable IPSS of 9.    Plan   -Follow up in PA clinic for 6 month follow up.         CC  Patient Care Team:  Navin Euceda MD as PCP - General (Family Practice)  Navin Euceda MD (Family Practice)  Navin Euceda MD as Assigned PCP  Audie Desouza AuD as Audiologist (Audiology)  Easton Silva MD as MD (Urology)  Easton Silva MD as Assigned Surgical Provider         I have visited this patient virtually together with the resident and agree with the note.

## 2023-11-14 ENCOUNTER — VIRTUAL VISIT (OUTPATIENT)
Dept: VASCULAR SURGERY | Facility: CLINIC | Age: 68
End: 2023-11-14
Payer: COMMERCIAL

## 2023-11-14 VITALS — WEIGHT: 215 LBS | BODY MASS INDEX: 30.1 KG/M2 | HEIGHT: 71 IN

## 2023-11-14 DIAGNOSIS — N40.0 BPH (BENIGN PROSTATIC HYPERPLASIA): Primary | ICD-10-CM

## 2023-11-14 PROCEDURE — 99213 OFFICE O/P EST LOW 20 MIN: CPT | Mod: 93 | Performed by: RADIOLOGY

## 2023-11-14 ASSESSMENT — PAIN SCALES - GENERAL: PAINLEVEL: NO PAIN (0)

## 2023-11-14 NOTE — NURSING NOTE
Patient confirms medications and allergies are accurate via patients echeck in completion, and or denies any changes since last reviewed/verified.     Is the patient currently in the state of [place]? YES    Visit mode:TELEPHONE    If the visit is dropped, the patient can be reconnected by: TELEPHONE VISIT: Phone number:   Telephone Information:   Mobile [phone]       Will anyone else be joining the visit? NO  (If patient encounters technical issues they should call [phone])    How would you like to obtain your AVS? MyChart    Are changes needed to the allergy or medication list? No    Reason for visit: RECHECK    Anna LYNN

## 2023-11-14 NOTE — LETTER
"11/14/2023       RE: Grey Markham  [address]  [place] [address]       Dear Colleague,    Thank you for referring your patient, Grey Markham, to the [facility] [place] at [facility]. Please see a copy of my visit note below.    [facility] Vascular      Type of Visit: Virtual: Telephone    Patient is here for a return visit to discuss  3 month follow up PAE procedure .     Ht 1.803 m (5' 11\")   Wt 97.5 kg (215 lb)   BMI 29.99 kg/m        Questions patient would like addressed today are: Patient verbalized no questions/concerns, this has been communicated to the provider.     Refills are needed: No    How would you like to obtain your AVS? Gavi Coats    Virtual Visit Details    Type of service:  Telephone Visit   Phone call duration: 7 minutes     INTERVENTIONAL RADIOLOGY CONSULTATION    Name: Grey Markham  Age: 68 year old   Referring Physician: Dr. Gutierrez ref. provider found   REASON FOR FOLLOW UP: Follow up PAE  6/28/2023    HPI: Patient is a 68 yo M with pmh BPH, s/p PAE 6/28/2023.  Since last follow up generally he has been well, but for the last 3 months, he feels that he has an episode about once or twice a week where he goes to the bathroom and then about 30 minutes later he feels an urgency. He is not on any medications for BPH. He does not drink any coffee, but has a couple sodas.    IPSS today is  9 (1,3,1,2,1,0,1),  pre-PAE 17, at 1 month follow up 9  QOL: 2, down from 4 pre-procedure.      ROS:  Negative unless otherwise stated in HPI.      Physical Examination:   VITALS:   There were no vitals taken for this visit.  Physical exam deferred for phone visit.      Labs:    BMP RESULTS:  Lab Results   Component Value Date     02/07/2023     08/14/2020    POTASSIUM 4.0 07/18/2023    POTASSIUM 4.9 02/07/2023    POTASSIUM 4.6 08/14/2020    CHLORIDE 111 (H) 02/07/2023    CHLORIDE 109 08/14/2020    " CO2 27 02/07/2023    CO2 27 08/14/2020    ANIONGAP 3 02/07/2023    ANIONGAP 5 08/14/2020    GLC 96 02/07/2023     (H) 08/14/2020    BUN 16 02/07/2023    BUN 16 08/14/2020    CR 0.95 07/18/2023    CR 0.99 08/14/2020    GFRESTIMATED 88 07/18/2023    GFRESTIMATED 80 08/14/2020    GFRESTBLACK >90 08/14/2020    LOBO 9.0 02/07/2023    LOBO 8.8 08/14/2020        CBC RESULTS:  Lab Results   Component Value Date    WBC 6.5 01/28/2017    RBC 4.82 01/28/2017    HGB 14.3 01/28/2017    HCT 42.8 01/28/2017    MCV 89 01/28/2017    MCH 29.7 01/28/2017    MCHC 33.4 01/28/2017    RDW 13.1 01/28/2017     06/28/2023     01/28/2017       INR/PTT:  Lab Results   Component Value Date    INR 1.00 06/28/2023       Diagnostic studies:   No new diagnostic studies.    Assessment   Patient is a 68 yo M with pmh BPH, s/p PAE 6/28/2023. He is doing well with stable IPSS of 9.    Plan   -Follow up in PA clinic for 6 month follow up.      I have visited this patient virtually together with the resident and agree with the note.        Again, thank you for allowing me to participate in the care of your patient.      Sincerely,    Lisbet Loya MD

## 2023-11-21 NOTE — PATIENT INSTRUCTIONS
You were seen today in the Vascular IR Clinic by Dr Lisbet Loya for follow up regarding PAE.    Plan:    - Follow up in PA Clinic in 3 months.    Please call or send a MyChart with any questions or concerns.    Marcy PARKS LPN/ Khushboo Michel, RNCC  [phone]

## 2023-12-01 NOTE — TELEPHONE ENCOUNTER
CLINICAL PHARMACY NOTE: MEDS TO BEDS    Total # of Prescriptions Filled: 3   The following medications were delivered to the patient:  Methocarbamol 500mg  Senexon-s 8.6-50mg  Diclofenac 1% gel    Additional Documentation:  Ora Scott delivered to patient 12-1-23 Chart review confirms both medications were sent 6/6/17 to requesting pharmacy, 6 month supply. Sent back as denied/duplicate.    Susie Sanabria MSN, RN-BC  Care Coordinator

## 2024-01-06 ENCOUNTER — HEALTH MAINTENANCE LETTER (OUTPATIENT)
Age: 69
End: 2024-01-06

## 2024-01-09 ASSESSMENT — ENCOUNTER SYMPTOMS
DIARRHEA: 0
CONSTIPATION: 0
HEMATURIA: 0
WEAKNESS: 0
NAUSEA: 0
JOINT SWELLING: 0
EYE PAIN: 0
DYSURIA: 0
PALPITATIONS: 0
HEADACHES: 0
ABDOMINAL PAIN: 0
DIZZINESS: 0
HEARTBURN: 1
SHORTNESS OF BREATH: 0
SORE THROAT: 0
HEMATOCHEZIA: 0
CHILLS: 0
FREQUENCY: 0
MYALGIAS: 1
NERVOUS/ANXIOUS: 0
ARTHRALGIAS: 1
FEVER: 0
PARESTHESIAS: 0
COUGH: 0

## 2024-01-09 ASSESSMENT — ACTIVITIES OF DAILY LIVING (ADL): CURRENT_FUNCTION: NO ASSISTANCE NEEDED

## 2024-01-16 ENCOUNTER — OFFICE VISIT (OUTPATIENT)
Dept: FAMILY MEDICINE | Facility: CLINIC | Age: 69
End: 2024-01-16
Payer: COMMERCIAL

## 2024-01-16 VITALS
HEART RATE: 59 BPM | HEIGHT: 71 IN | RESPIRATION RATE: 13 BRPM | TEMPERATURE: 97.1 F | SYSTOLIC BLOOD PRESSURE: 134 MMHG | OXYGEN SATURATION: 98 % | DIASTOLIC BLOOD PRESSURE: 84 MMHG | WEIGHT: 219.2 LBS | BODY MASS INDEX: 30.69 KG/M2

## 2024-01-16 DIAGNOSIS — I10 ESSENTIAL HYPERTENSION WITH GOAL BLOOD PRESSURE LESS THAN 140/90: ICD-10-CM

## 2024-01-16 DIAGNOSIS — M25.531 RIGHT WRIST PAIN: ICD-10-CM

## 2024-01-16 DIAGNOSIS — Z29.11 NEED FOR VACCINATION AGAINST RESPIRATORY SYNCYTIAL VIRUS: ICD-10-CM

## 2024-01-16 DIAGNOSIS — N40.1 BENIGN PROSTATIC HYPERPLASIA WITH LOWER URINARY TRACT SYMPTOMS, SYMPTOM DETAILS UNSPECIFIED: ICD-10-CM

## 2024-01-16 DIAGNOSIS — F41.9 ANXIETY: ICD-10-CM

## 2024-01-16 DIAGNOSIS — Z00.00 ENCOUNTER FOR MEDICARE ANNUAL WELLNESS EXAM: Primary | ICD-10-CM

## 2024-01-16 DIAGNOSIS — I25.10 ATHEROSCLEROSIS OF NATIVE CORONARY ARTERY OF NATIVE HEART WITHOUT ANGINA PECTORIS: ICD-10-CM

## 2024-01-16 DIAGNOSIS — R97.20 ELEVATED PROSTATE SPECIFIC ANTIGEN (PSA): ICD-10-CM

## 2024-01-16 DIAGNOSIS — E78.5 HYPERLIPIDEMIA LDL GOAL <100: ICD-10-CM

## 2024-01-16 DIAGNOSIS — R39.15 URINARY URGENCY: ICD-10-CM

## 2024-01-16 DIAGNOSIS — Z12.5 PROSTATE CANCER SCREENING: ICD-10-CM

## 2024-01-16 LAB
ALT SERPL W P-5'-P-CCNC: 35 U/L (ref 0–70)
ANION GAP SERPL CALCULATED.3IONS-SCNC: 9 MMOL/L (ref 7–15)
BUN SERPL-MCNC: 13.2 MG/DL (ref 8–23)
CALCIUM SERPL-MCNC: 9.2 MG/DL (ref 8.8–10.2)
CHLORIDE SERPL-SCNC: 106 MMOL/L (ref 98–107)
CHOLEST SERPL-MCNC: 157 MG/DL
CREAT SERPL-MCNC: 1.05 MG/DL (ref 0.67–1.17)
DEPRECATED HCO3 PLAS-SCNC: 26 MMOL/L (ref 22–29)
EGFRCR SERPLBLD CKD-EPI 2021: 78 ML/MIN/1.73M2
FASTING STATUS PATIENT QL REPORTED: YES
GLUCOSE SERPL-MCNC: 117 MG/DL (ref 70–99)
HDLC SERPL-MCNC: 57 MG/DL
LDLC SERPL CALC-MCNC: 62 MG/DL
NONHDLC SERPL-MCNC: 100 MG/DL
POTASSIUM SERPL-SCNC: 4.9 MMOL/L (ref 3.4–5.3)
PSA SERPL DL<=0.01 NG/ML-MCNC: 8.12 NG/ML (ref 0–4.5)
SODIUM SERPL-SCNC: 141 MMOL/L (ref 135–145)
TRIGL SERPL-MCNC: 192 MG/DL

## 2024-01-16 PROCEDURE — G0103 PSA SCREENING: HCPCS | Performed by: FAMILY MEDICINE

## 2024-01-16 PROCEDURE — G0438 PPPS, INITIAL VISIT: HCPCS | Performed by: FAMILY MEDICINE

## 2024-01-16 PROCEDURE — 99214 OFFICE O/P EST MOD 30 MIN: CPT | Mod: 25 | Performed by: FAMILY MEDICINE

## 2024-01-16 PROCEDURE — 80061 LIPID PANEL: CPT | Performed by: FAMILY MEDICINE

## 2024-01-16 PROCEDURE — 36415 COLL VENOUS BLD VENIPUNCTURE: CPT | Performed by: FAMILY MEDICINE

## 2024-01-16 PROCEDURE — 84460 ALANINE AMINO (ALT) (SGPT): CPT | Performed by: FAMILY MEDICINE

## 2024-01-16 PROCEDURE — 80048 BASIC METABOLIC PNL TOTAL CA: CPT | Performed by: FAMILY MEDICINE

## 2024-01-16 RX ORDER — RESPIRATORY SYNCYTIAL VIRUS VACCINE 120MCG/0.5
0.5 KIT INTRAMUSCULAR ONCE
Qty: 1 EACH | Refills: 0 | Status: CANCELLED | OUTPATIENT
Start: 2024-01-16 | End: 2024-01-16

## 2024-01-16 RX ORDER — FLUOXETINE 20 MG/1
20 TABLET, FILM COATED ORAL DAILY
Qty: 90 TABLET | Refills: 1 | Status: SHIPPED | OUTPATIENT
Start: 2024-01-16

## 2024-01-16 RX ORDER — SULINDAC 150 MG/1
150 TABLET ORAL 2 TIMES DAILY WITH MEALS
Qty: 180 TABLET | Refills: 1 | Status: SHIPPED | OUTPATIENT
Start: 2024-01-16

## 2024-01-16 RX ORDER — LOSARTAN POTASSIUM 100 MG/1
TABLET ORAL
Qty: 90 TABLET | Refills: 0 | Status: SHIPPED | OUTPATIENT
Start: 2024-01-16 | End: 2024-04-14

## 2024-01-16 RX ORDER — TOLTERODINE 2 MG/1
2 CAPSULE, EXTENDED RELEASE ORAL DAILY
Qty: 90 CAPSULE | Refills: 0 | Status: SHIPPED | OUTPATIENT
Start: 2024-01-16 | End: 2024-02-01

## 2024-01-16 RX ORDER — TAMSULOSIN HYDROCHLORIDE 0.4 MG/1
0.4 CAPSULE ORAL DAILY
Qty: 90 CAPSULE | Refills: 0 | Status: SHIPPED | OUTPATIENT
Start: 2024-01-16 | End: 2024-04-14

## 2024-01-16 ASSESSMENT — ENCOUNTER SYMPTOMS
NAUSEA: 0
ABDOMINAL PAIN: 0
ARTHRALGIAS: 1
MYALGIAS: 1
JOINT SWELLING: 0
HEARTBURN: 1
CONSTIPATION: 0
PALPITATIONS: 0
DIARRHEA: 0
COUGH: 0
SORE THROAT: 0
DIZZINESS: 0
FEVER: 0
FREQUENCY: 0
HEMATOCHEZIA: 0
SHORTNESS OF BREATH: 0
WEAKNESS: 0
DYSURIA: 0
CHILLS: 0
PARESTHESIAS: 0
HEMATURIA: 0
EYE PAIN: 0
HEADACHES: 0
NERVOUS/ANXIOUS: 0

## 2024-01-16 ASSESSMENT — ACTIVITIES OF DAILY LIVING (ADL): CURRENT_FUNCTION: NO ASSISTANCE NEEDED

## 2024-01-16 ASSESSMENT — PAIN SCALES - GENERAL: PAINLEVEL: NO PAIN (0)

## 2024-01-16 NOTE — PATIENT INSTRUCTIONS
Patient Education   Personalized Prevention Plan  You are due for the preventive services outlined below.  Your care team is available to assist you in scheduling these services.  If you have already completed any of these items, please share that information with your care team to update in your medical record.  Health Maintenance Due   Topic Date Due    RSV VACCINE (Pregnancy & 60+) (1 - 1-dose 60+ series) Never done    Annual Wellness Visit  10/24/2023    ANNUAL REVIEW OF HM ORDERS  10/24/2023        Preventive Health Recommendations  See your health care provider every year to  Review health changes.   Discuss preventive care.    Review your medicines if your doctor has prescribed any.  Talk with your health care provider about whether you should have a test to screen for prostate cancer (PSA).  Every 3 years, have a diabetes test (fasting glucose). If you are at risk for diabetes, you should have this test more often.  Every 5 years, have a cholesterol test. Have this test more often if you are at risk for high cholesterol or heart disease.   Every 10 years, have a colonoscopy. Or, have a yearly FIT test (stool test). These exams will check for colon cancer.  Talk to with your health care provider about screening for Abdominal Aortic Aneurysm if you have a family history of AAA or have a history of smoking.    Shots:   Get a flu shot each year.   Get a tetanus shot every 10 years.   Talk to your doctor about your pneumonia vaccines. There are now two you should receive - Pneumovax (PPSV 23) and Prevnar (PCV 13).  Talk to your pharmacist about a shingles vaccine.   Talk to your doctor about the hepatitis B vaccine.    Nutrition:   Eat at least 5 servings of fruits and vegetables each day.   Eat whole-grain bread, whole-wheat pasta and brown rice instead of white grains and rice.   Get adequate Calcium and Vitamin D.     Lifestyle  Aerobic exercise for at least 150 minutes per week (40+ minutes per day, 4-6 days  per week). This will help you control your weight and prevent disease.   Limit alcohol to one drink per day.   No smoking.   Wear sunscreen to prevent skin cancer.   See your dentist every six months for an exam and cleaning.   See your eye doctor every 1 to 2 years to screen for conditions such as glaucoma, macular degeneration and cataracts.

## 2024-01-16 NOTE — PROGRESS NOTES
"SUBJECTIVE:   Gery is a 68 year old, presenting for the following:  Physical        1/16/2024     8:09 AM   Additional Questions   Roomed by Janeth       Are you in the first 12 months of your Medicare coverage?  No    Healthy Habits:     In general, how would you rate your overall health?  Excellent    Frequency of exercise:  4-5 days/week    Duration of exercise:  30-45 minutes    Do you usually eat at least 4 servings of fruit and vegetables a day, include whole grains    & fiber and avoid regularly eating high fat or \"junk\" foods?  Yes    Taking medications regularly:  Yes    Medication side effects:  None    Ability to successfully perform activities of daily living:  No assistance needed    Home Safety:  No safety concerns identified    Hearing Impairment:  Difficulty following a conversation in a noisy restaurant or crowded room, difficulty following dialogue in the theater and difficulty understanding soft or whispered speech    In general, how would you rate your overall mental or emotional health?  Excellent      Today's PHQ-2 Score:       1/16/2024     8:00 AM   PHQ-2 ( 1999 [employer])   Q1: Little interest or pleasure in doing things 0   Q2: Feeling down, depressed or hopeless 0   PHQ-2 Score 0   Q1: Little interest or pleasure in doing things Not at all   Q2: Feeling down, depressed or hopeless Not at all   PHQ-2 Score 0           Have you ever done Advance Care Planning? (For example, a Health Directive, POLST, or a discussion with a medical provider or your loved ones about your wishes): No, advance care planning information given to patient to review.  Advanced care planning was discussed at today's visit.       Fall risk  Fallen 2 or more times in the past year?: No  Any fall with injury in the past year?: No    Cognitive Screening   1) Repeat 3 items (Leader, Season, Table)    2) Clock draw: NORMAL  3) 3 item recall: Recalls 3 objects  Results: 3 items recalled: COGNITIVE IMPAIRMENT LESS " LIKELY    Mini-CogTM Copyright GUERO Pollack. Licensed by the author for use in [facility]; reprinted with permission (luz elena@[facility]). All rights reserved.        Allergies              Social History     Tobacco Use    Smoking status: Never    Smokeless tobacco: Never    Tobacco comments:     Less than 100 cigars in his lifetime   Substance Use Topics    Alcohol use: Yes     Comment: weekends             1/9/2024    12:43 PM   Alcohol Use   Prescreen: >3 drinks/day or >7 drinks/week? Yes   AUDIT SCORE  9     Do you have a current opioid prescription? No  Do you use any other controlled substances or medications that are not prescribed by a provider? None      Hyperlipidemia Follow-Up    Are you regularly taking any medication or supplement to lower your cholesterol?   Yes- atorvastatin  Are you having muscle aches or other side effects that you think could be caused by your cholesterol lowering medication?  No    Hypertension Follow-up    Do you check your blood pressure regularly outside of the clinic? Yes   Are you following a low salt diet? No  Are your blood pressures ever more than 140 on the top number (systolic) OR more   than 90 on the bottom number (diastolic), for example 140/90? Yes    Current providers sharing in care for this patient include:   Patient Care Team:  Navin Euceda MD as PCP - General (Family Practice)  Navin Euceda MD (Family Practice)  Navin Euceda MD as Assigned PCP  Audie Desouza AuD as Audiologist (Audiology)  Easton Silva MD as MD (Urology)  Easton Silva MD as Assigned Surgical Provider    The following health maintenance items are reviewed in Epic and correct as of today:  Health Maintenance   Topic Date Due    RSV VACCINE (Pregnancy & 60+) (1 - 1-dose 60+ series) Never done    MEDICARE ANNUAL WELLNESS VISIT  10/24/2023    ANNUAL REVIEW OF HM ORDERS  10/24/2023    LIPID  07/18/2024    FALL RISK ASSESSMENT  01/16/2025    COLORECTAL CANCER  "SCREENING  07/09/2028    ADVANCE CARE PLANNING  01/16/2029    DTAP/TDAP/TD IMMUNIZATION (3 - Td or Tdap) 10/24/2032    HEPATITIS C SCREENING  Completed    PHQ-2 (once per calendar year)  Completed    INFLUENZA VACCINE  Completed    Pneumococcal Vaccine: 65+ Years  Completed    ZOSTER IMMUNIZATION  Completed    AORTIC ANEURYSM SCREENING (SYSTEM ASSIGNED)  Completed    COVID-19 Vaccine  Completed    IPV IMMUNIZATION  Aged Out    HPV IMMUNIZATION  Aged Out    MENINGITIS IMMUNIZATION  Aged Out    RSV MONOCLONAL ANTIBODY  Aged Out       Review of Systems   Constitutional:  Negative for chills and fever.   HENT:  Positive for hearing loss. Negative for congestion, ear pain and sore throat.    Eyes:  Negative for pain and visual disturbance.   Respiratory:  Negative for cough and shortness of breath.    Cardiovascular:  Negative for chest pain, palpitations and peripheral edema.   Gastrointestinal:  Positive for heartburn. Negative for abdominal pain, constipation, diarrhea, hematochezia and nausea.   Genitourinary:  Positive for urgency. Negative for dysuria, frequency, genital sores, hematuria, impotence and penile discharge.   Musculoskeletal:  Positive for arthralgias and myalgias. Negative for joint swelling.   Skin:  Negative for rash.   Neurological:  Negative for dizziness, weakness, headaches and paresthesias.   Psychiatric/Behavioral:  Negative for mood changes. The patient is not nervous/anxious.      Patient reports he has been having right foot pain. He notes he had a fracture of right foot and states his toes become numb sometimes.    OBJECTIVE:   /84 (BP Location: Left arm, Patient Position: Chair, Cuff Size: Adult Regular)   Pulse 59   Temp 97.1  F (36.2  C) (Oral)   Resp 13   Ht 1.793 m (5' 10.59\")   Wt 99.4 kg (219 lb 3.2 oz)   SpO2 98%   BMI 30.93 kg/m   Estimated body mass index is 30.93 kg/m  as calculated from the following:    Height as of this encounter: 1.793 m (5' 10.59\").    Weight " as of this encounter: 99.4 kg (219 lb 3.2 oz).  Physical Exam  GENERAL: healthy, alert and no distress  EYES: Eyes grossly normal to inspection, PERRL and conjunctivae and sclerae normal  HENT: ear canals and TM's normal, nose and mouth without ulcers or lesions  NECK: no adenopathy, no asymmetry, masses, or scars and thyroid normal to palpation  RESP: lungs clear to auscultation - no rales, rhonchi or wheezes  CV: regular rate and rhythm, normal S1 S2, no S3 or S4, no murmur, click or rub, no peripheral edema and peripheral pulses strong  ABDOMEN: soft, nontender, no hepatosplenomegaly, no masses and bowel sounds normal   (male): normal male genitalia without lesions or urethral discharge, no hernia  MS: no gross musculoskeletal defects noted, no edema  SKIN: no suspicious lesions or rashes  NEURO: Normal strength and tone, mentation intact and speech normal. Reflexes normal and symmetric in the upper and lower extremities.   PSYCH: mentation appears normal, affect normal/bright     ASSESSMENT / PLAN:   (Z00.00) Encounter for Medicare annual wellness exam  (primary encounter diagnosis)  Comment: Negative screening exam; up-to-date on preventive services.   Plan: follow up in 1 year    (I25.10) Atherosclerosis of native coronary artery of native heart without angina pectoris  (E78.5) Hyperlipidemia LDL goal <100  Comment: Clinically stable He is on a high-intensity statin.   Plan: Lipid panel reflex to direct LDL Non-fasting,         ALT         Return in about 6 months (around 7/16/2024) for blood pressure check, lab tests, recheck medications.      (I10) Essential hypertension with goal blood pressure less than 140/90  Comment: borderline controlled.   Plan: losartan (COZAAR) 100 MG tablet, Basic         metabolic panel        No med change.    (R39.15) Urinary urgency  Comment: This was discussed with his surgeon on 11/14/23. It sound like either OAB or recurrence of his BPH symptoms.   Plan: tamsulosin  "(FLOMAX) 0.4 MG capsule, tolterodine        ER (DETROL LA) 2 MG 24 hr capsule        Try tamsulosin for 1+ months. If it is effective, he will probably want to talk to his surgeon about repeating the procedure (IR PAE). If it is not, he will switch to tolterodine.     (N40.1) Benign prostatic hyperplasia with lower urinary tract symptoms, symptom details unspecified  (R97.20) Elevated prostate specific antigen (PSA)  (Z12.5) Prostate cancer screening  Comment: We will continue to monitor his PSA despite his recent elevations. Likely it will have decreased since his procedure.   Plan: Prostate Specific Antigen Screen            (F41.9) Anxiety  Comment: prescription update.   Plan: FLUoxetine 20 MG tablet    (M25.531) Right wrist pain  Comment: refill request. He is not ready to return to ortho yet.   Plan: sulindac (CLINORIL) 150 MG tablet                  (Z29.11) Need for vaccination against respiratory syncytial virus  Comment: RSV  Plan: VIS provided.         COUNSELING:  Reviewed preventive health counseling, as reflected in patient instructions  Special attention given to:       Regular exercise       Prostate cancer screening      BMI:   Estimated body mass index is 30.93 kg/m  as calculated from the following:    Height as of this encounter: 1.793 m (5' 10.59\").    Weight as of this encounter: 99.4 kg (219 lb 3.2 oz).   Weight management plan: exercise recommendations summarized in the AVS.      He reports that he has never smoked. He has never used smokeless tobacco.      Appropriate preventive services were discussed with this patient, including applicable screening as appropriate for fall prevention, nutrition, physical activity, Tobacco-use cessation, weight loss and cognition.  Checklist reviewing preventive services available has been given to the patient.    Reviewed patients plan of care and provided an AVS. The Basic Care Plan (routine screening as documented in Health Maintenance) for Grey meets " the Care Plan requirement. This Care Plan has been established and reviewed with the Patient.          Navin Euceda MD  [facility]    This document serves as a record of the services and decisions personally performed and made by Dr. Euceda. It was created on his behalf by Sheila Schrader, a trained medical scribe. The creation of this document is based the provider's statements to the medical scribe.  Sheila Schrader,  11:08 AM

## 2024-01-29 ENCOUNTER — MYC MEDICAL ADVICE (OUTPATIENT)
Dept: FAMILY MEDICINE | Facility: CLINIC | Age: 69
End: 2024-01-29
Payer: COMMERCIAL

## 2024-01-29 DIAGNOSIS — N40.1 BENIGN PROSTATIC HYPERPLASIA WITH LOWER URINARY TRACT SYMPTOMS, SYMPTOM DETAILS UNSPECIFIED: Primary | ICD-10-CM

## 2024-02-01 ENCOUNTER — TELEPHONE (OUTPATIENT)
Dept: VASCULAR SURGERY | Facility: CLINIC | Age: 69
End: 2024-02-01
Payer: COMMERCIAL

## 2024-02-01 RX ORDER — OXYBUTYNIN CHLORIDE 5 MG/1
5 TABLET, EXTENDED RELEASE ORAL DAILY
Qty: 30 TABLET | Refills: 1 | Status: SHIPPED | OUTPATIENT
Start: 2024-02-01

## 2024-02-01 NOTE — TELEPHONE ENCOUNTER
Left Voicemail (1st Attempt) for the patient to call back and schedule the following:    Appointment type: RVASCP  Provider: SCHUYLER GUO  Return date: 2/14/24  Specialty phone number: [phone]  Additional appointment(s) needed: NA  Additonal Notes: per checkout note.

## 2024-02-07 ENCOUNTER — TELEPHONE (OUTPATIENT)
Dept: VASCULAR SURGERY | Facility: CLINIC | Age: 69
End: 2024-02-07
Payer: COMMERCIAL

## 2024-02-29 ENCOUNTER — TRANSFERRED RECORDS (OUTPATIENT)
Dept: HEALTH INFORMATION MANAGEMENT | Facility: CLINIC | Age: 69
End: 2024-02-29
Payer: COMMERCIAL

## 2024-03-19 ENCOUNTER — MYC MEDICAL ADVICE (OUTPATIENT)
Dept: FAMILY MEDICINE | Facility: CLINIC | Age: 69
End: 2024-03-19
Payer: COMMERCIAL

## 2024-03-19 DIAGNOSIS — I25.10 ATHEROSCLEROSIS OF NATIVE CORONARY ARTERY OF NATIVE HEART WITHOUT ANGINA PECTORIS: ICD-10-CM

## 2024-03-19 DIAGNOSIS — E78.5 HYPERLIPIDEMIA LDL GOAL <100: ICD-10-CM

## 2024-03-19 RX ORDER — ATORVASTATIN CALCIUM 80 MG/1
80 TABLET, FILM COATED ORAL DAILY
Qty: 90 TABLET | Refills: 1 | OUTPATIENT
Start: 2024-03-19

## 2024-04-04 ENCOUNTER — TRANSFERRED RECORDS (OUTPATIENT)
Dept: HEALTH INFORMATION MANAGEMENT | Facility: CLINIC | Age: 69
End: 2024-04-04
Payer: COMMERCIAL

## 2024-04-12 ENCOUNTER — TELEPHONE (OUTPATIENT)
Dept: FAMILY MEDICINE | Facility: CLINIC | Age: 69
End: 2024-04-12
Payer: COMMERCIAL

## 2024-04-12 NOTE — TELEPHONE ENCOUNTER
Reason for Call:  Appointment Request    Patient requesting this type of appt: Pre-op    Requested provider: Navin Euceda    Reason patient unable to be scheduled: Not within requested timeframe    When does patient want to be seen/preferred time:  Before June 3rd    Comments: could not find a pre op before June 3rd     Could we send this information to you in [employer] or would you prefer to receive a phone call?:   Patient would prefer a phone call   Okay to leave a detailed message?: Yes at Cell number on file:    Telephone Information:   Mobile [phone]       Call taken on 4/12/2024 at 2:04 PM by Tianna Montoya

## 2024-04-12 NOTE — TELEPHONE ENCOUNTER
Called and LVM for patient to call back and schedule Pre-Op. As of 04/12/2024. Available appointments in May.    Janeth Pickens

## 2024-04-14 DIAGNOSIS — R39.15 URINARY URGENCY: ICD-10-CM

## 2024-04-14 DIAGNOSIS — I10 ESSENTIAL HYPERTENSION WITH GOAL BLOOD PRESSURE LESS THAN 140/90: ICD-10-CM

## 2024-04-15 ENCOUNTER — MYC MEDICAL ADVICE (OUTPATIENT)
Dept: FAMILY MEDICINE | Facility: CLINIC | Age: 69
End: 2024-04-15
Payer: COMMERCIAL

## 2024-04-15 RX ORDER — LOSARTAN POTASSIUM 100 MG/1
TABLET ORAL
Qty: 90 TABLET | Refills: 2 | Status: SHIPPED | OUTPATIENT
Start: 2024-04-15

## 2024-04-15 RX ORDER — TAMSULOSIN HYDROCHLORIDE 0.4 MG/1
0.4 CAPSULE ORAL DAILY
Qty: 90 CAPSULE | Refills: 0 | Status: SHIPPED | OUTPATIENT
Start: 2024-04-15 | End: 2024-04-18 | Stop reason: SINTOL

## 2024-04-15 NOTE — TELEPHONE ENCOUNTER
Called and LVM for patient to schedule Pre-Op as of 04/15/2024. Available appointments in May.  Sent patient [employer] message.  Janeth Pickens

## 2024-04-17 ENCOUNTER — MYC MEDICAL ADVICE (OUTPATIENT)
Dept: FAMILY MEDICINE | Facility: CLINIC | Age: 69
End: 2024-04-17
Payer: COMMERCIAL

## 2024-04-17 NOTE — TELEPHONE ENCOUNTER
Routing to PCP to review/advise on MyC:    See OV 1/16/24 with PCP for original plan with Tamsulosin, however med was re-ordered 4/15/24:        See also MyC 1/29/24 for patient follow up on med:          RADHA NegronN, RN  [facility]

## 2024-05-15 ENCOUNTER — OFFICE VISIT (OUTPATIENT)
Dept: FAMILY MEDICINE | Facility: CLINIC | Age: 69
End: 2024-05-15
Payer: COMMERCIAL

## 2024-05-15 VITALS
OXYGEN SATURATION: 97 % | SYSTOLIC BLOOD PRESSURE: 134 MMHG | HEART RATE: 87 BPM | RESPIRATION RATE: 18 BRPM | DIASTOLIC BLOOD PRESSURE: 86 MMHG | HEIGHT: 70 IN | TEMPERATURE: 98.2 F | BODY MASS INDEX: 30.06 KG/M2 | WEIGHT: 210 LBS

## 2024-05-15 DIAGNOSIS — M19.031 LOCALIZED PRIMARY OSTEOARTHRITIS OF CARPOMETACARPAL (CMC) JOINT OF RIGHT WRIST: ICD-10-CM

## 2024-05-15 DIAGNOSIS — N40.1 BENIGN PROSTATIC HYPERPLASIA WITH LOWER URINARY TRACT SYMPTOMS, SYMPTOM DETAILS UNSPECIFIED: ICD-10-CM

## 2024-05-15 DIAGNOSIS — Z01.818 PRE-OPERATIVE GENERAL PHYSICAL EXAMINATION: Primary | ICD-10-CM

## 2024-05-15 DIAGNOSIS — I10 ESSENTIAL HYPERTENSION WITH GOAL BLOOD PRESSURE LESS THAN 140/90: ICD-10-CM

## 2024-05-15 DIAGNOSIS — I25.10 ATHEROSCLEROSIS OF NATIVE CORONARY ARTERY OF NATIVE HEART WITHOUT ANGINA PECTORIS: ICD-10-CM

## 2024-05-15 DIAGNOSIS — R20.2 PARESTHESIA OF RIGHT FOOT: ICD-10-CM

## 2024-05-15 DIAGNOSIS — K21.9 GASTROESOPHAGEAL REFLUX DISEASE WITHOUT ESOPHAGITIS: ICD-10-CM

## 2024-05-15 DIAGNOSIS — Z28.21 VACCINATION NOT CARRIED OUT BECAUSE OF PATIENT REFUSAL: ICD-10-CM

## 2024-05-15 DIAGNOSIS — Z23 NEED FOR VACCINATION: ICD-10-CM

## 2024-05-15 PROCEDURE — 99214 OFFICE O/P EST MOD 30 MIN: CPT | Mod: 25 | Performed by: FAMILY MEDICINE

## 2024-05-15 PROCEDURE — 82565 ASSAY OF CREATININE: CPT | Performed by: FAMILY MEDICINE

## 2024-05-15 PROCEDURE — 93000 ELECTROCARDIOGRAM COMPLETE: CPT | Performed by: FAMILY MEDICINE

## 2024-05-15 PROCEDURE — 90480 ADMN SARSCOV2 VAC 1/ONLY CMP: CPT | Performed by: FAMILY MEDICINE

## 2024-05-15 PROCEDURE — 36415 COLL VENOUS BLD VENIPUNCTURE: CPT | Performed by: FAMILY MEDICINE

## 2024-05-15 PROCEDURE — 91320 SARSCV2 VAC 30MCG TRS-SUC IM: CPT | Performed by: FAMILY MEDICINE

## 2024-05-15 PROCEDURE — 84132 ASSAY OF SERUM POTASSIUM: CPT | Performed by: FAMILY MEDICINE

## 2024-05-15 PROCEDURE — 80061 LIPID PANEL: CPT | Performed by: FAMILY MEDICINE

## 2024-05-15 ASSESSMENT — PAIN SCALES - GENERAL: PAINLEVEL: NO PAIN (0)

## 2024-05-15 NOTE — PATIENT INSTRUCTIONS
How to Take Your Medication Before Surgery  Preoperative Medication Instructions   Antiplatelet or Anticoagulation Medication Instructions   - Bleeding risk is low for this procedure (e.g. dental, skin, cataract).    Additional Medication Instructions  Take all scheduled medications on the day of surgery EXCEPT for modifications listed below:   - ACE/ARB: DO NOT TAKE on day of surgery (minimum 11 hours for general anesthesia). -- losartan        Patient Education   Preparing for Your Surgery  Getting started  A nurse will call you to review your health history and instructions. They will give you an arrival time based on your scheduled surgery time. Please be ready to share:  Your doctor's clinic name and phone number  Your medical, surgical, and anesthesia history  A list of allergies and sensitivities  A list of medicines, including herbal treatments and over-the-counter drugs  Whether the patient has a legal guardian (ask how to send us the papers in advance)  Please tell us if you're pregnant--or if there's any chance you might be pregnant. Some surgeries may injure a fetus (unborn baby), so they require a pregnancy test. Surgeries that are safe for a fetus don't always need a test, and you can choose whether to have one.   If you have a child who's having surgery, please ask for a copy of Preparing for Your Child's Surgery.    Preparing for surgery  Within 10 to 30 days of surgery: Have a pre-op exam (sometimes called an H&P, or History and Physical). This can be done at a clinic or pre-operative center.  If you're having a , you may not need this exam. Talk to your care team.  At your pre-op exam, talk to your care team about all medicines you take. If you need to stop any medicines before surgery, ask when to start taking them again.  We do this for your safety. Many medicines can make you bleed too much during surgery. Some change how well surgery (anesthesia) drugs work.  Call your insurance  company to let them know you're having surgery. (If you don't have insurance, call [phone].)  Call your clinic if there's any change in your health. This includes signs of a cold or flu (sore throat, runny nose, cough, rash, fever). It also includes a scrape or scratch near the surgery site.  If you have questions on the day of surgery, call your hospital or surgery center.  Eating and drinking guidelines  For your safety: Unless your surgeon tells you otherwise, follow the guidelines below.  Eat and drink as usual until 8 hours before you arrive for surgery. After that, no food or milk.  Drink clear liquids until 2 hours before you arrive. These are liquids you can see through, like water, Gatorade, and Propel Water. They also include plain black coffee and tea (no cream or milk), candy, and breath mints. You can spit out gum when you arrive.  If you drink alcohol: Stop drinking it the night before surgery.  If your care team tells you to take medicine on the morning of surgery, it's okay to take it with a sip of water.  Preventing infection  Shower or bathe the night before and morning of your surgery. Follow the instructions your clinic gave you. (If no instructions, use regular soap.)  Don't shave or clip hair near your surgery site. We'll remove the hair if needed.  Don't smoke or vape the morning of surgery. You may chew nicotine gum up to 2 hours before surgery. A nicotine patch is okay.  Note: Some surgeries require you to completely quit smoking and nicotine. Check with your surgeon.  Your care team will make every effort to keep you safe from infection. We will:  Clean our hands often with soap and water (or an alcohol-based hand rub).  Clean the skin at your surgery site with a special soap that kills germs.  Give you a special gown to keep you warm. (Cold raises the risk of infection.)  Wear special hair covers, masks, gowns and gloves during surgery.  Give antibiotic medicine, if prescribed. Not  all surgeries need antibiotics.  What to bring on the day of surgery  Photo ID and insurance card  Copy of your health care directive, if you have one  Glasses and hearing aids (bring cases)  You can't wear contacts during surgery  Inhaler and eye drops, if you use them (tell us about these when you arrive)  CPAP machine or breathing device, if you use them  A few personal items, if spending the night  If you have . . .  A pacemaker, ICD (cardiac defibrillator) or other implant: Bring the ID card.  An implanted stimulator: Bring the remote control.  A legal guardian: Bring a copy of the certified (court-stamped) guardianship papers.  Please remove any jewelry, including body piercings. Leave jewelry and other valuables at home.  If you're going home the day of surgery  You must have a responsible adult drive you home. They should stay with you overnight as well.  If you don't have someone to stay with you, and you aren't safe to go home alone, we may keep you overnight. Insurance often won't pay for this.  After surgery  If it's hard to control your pain or you need more pain medicine, please call your surgeon's office.  Questions?   If you have any questions for your care team, list them here: _________________________________________________________________________________________________________________________________________________________________________ ____________________________________ ____________________________________ ____________________________________  For informational purposes only. Not to replace the advice of your health care provider. Copyright   2003, 2019 [facility]. All rights reserved. Clinically reviewed by Carmen Morales MD. [employer] [id number] - REV 12/22.

## 2024-05-15 NOTE — PROGRESS NOTES
Preoperative Evaluation  [address] [address]  Phone: [phone]  Primary Provider: Navin Euceda MD  Pre-op Performing Provider: Navin Euceda MD  May 15, 2024       Surgical Information  Surgery/Procedure: ROBOTIC AQUABLATION OF PROSTATE   Surgery Location: [facility]  Surgeon: Jose Luis Holloway MD   Surgery Date: 6/3/2024   Time of Surgery: 5:30 AM  Where patient plans to recover: Other: Staying for a day in hospital  Fax number for surgical facility: 1 [phone]     Assessment & Plan     The proposed surgical procedure is considered INTERMEDIATE risk.    (Z01.818) Pre-operative general physical examination  (primary encounter diagnosis)  (N40.1) Benign prostatic hyperplasia with lower urinary tract symptoms, symptom details unspecified  Comment:   Plan: ablation, as above.     (I25.10) Atherosclerosis of native coronary artery of native heart without angina pectoris  Comment: Fasting. Clinically stable.   Plan: Lipid panel reflex to direct LDL Non-fasting,         EKG 12-lead complete w/read - Clinics            (I10) Essential hypertension with goal blood pressure less than 140/90  Comment: well controlled.   Plan: Creatinine, Potassium         Return in about 8 months (around 1/16/2025) for Medicare annual wellness exam, blood pressure check, recheck medications.      (K21.9) Gastroesophageal reflux disease without esophagitis  Comment:   Plan:     (M19.031) Localized primary osteoarthritis of carpometacarpal (CMC) joint of right wrist  Comment: Recurrence of symptoms. Is interested in another steroid injection.   Plan: Orthopedic  Referral            (R20.2) Paresthesia of right foot  Comment: Limited to some of the toes.   Plan: Orthopedic  Referral        (Podiatry)    (Z23) Need for vaccination  Comment:   Plan: COVID-19 12+ (2023-24) (PFIZER)            (Z28.21) Vaccination  not carried out because of patient refusal  Comment: RSV  Plan:       Possible Sleep Apnea: snores but no sleep apnea on testing. {     - No identified additional risk factors other than previously addressed    Antiplatelet or Anticoagulation Medication Instructions   - Bleeding risk is low for this procedure (e.g. dental, skin, cataract).    Additional Medication Instructions  Take all scheduled medications on the day of surgery EXCEPT for modifications listed below:   - ACE/ARB: DO NOT TAKE on day of surgery (minimum 11 hours for general anesthesia). (Losartan)    Recommendation  Approval given to proceed with proposed procedure, without further diagnostic evaluation.        Bassam Edmonds is a 68 year old, presenting for the following:  Pre-Op Exam          5/15/2024     3:40 PM   Additional Questions   Roomed by Crystal   Accompanied by self         5/15/2024   Forms   Any forms needing to be completed Yes         5/15/2024     3:40 PM   Patient Reported Additional Medications   Patient reports taking the following new medications none     HPI related to upcoming procedure: This 68 year old male complains of continued symptoms associated with his enlarged prostate despite previous procedure and medications.         5/8/2024   Pre-Op Questionnaire   Have you ever had a heart attack or stroke? No   Have you ever had surgery on your heart or blood vessels, such as a stent placement, a coronary artery bypass, or surgery on an artery in your head, neck, heart, or legs? No   Do you have chest pain with activity? No   Do you have a history of heart failure? No   Do you currently have a cold, bronchitis or symptoms of other infection? No   Do you have a cough, shortness of breath, or wheezing? No   Do you or anyone in your family have previous history of blood clots? No   Do you or does anyone in your family have a serious bleeding problem such as prolonged bleeding following surgeries or cuts? No   Have you ever  had problems with anemia or been told to take iron pills? No   Have you had any abnormal blood loss such as black, tarry or bloody stools? No   Have you ever had a blood transfusion? No   Are you willing to have a blood transfusion if it is medically needed before, during, or after your surgery? Yes   Have you or any of your relatives ever had problems with anesthesia? No   Do you have sleep apnea, excessive snoring or daytime drowsiness? YES - heavy snoring but no JASON on testing   Do you have a CPAP machine? No   Do you have any artifical heart valves or other implanted medical devices like a pacemaker, defibrillator, or continuous glucose monitor? No   Do you have artificial joints? No   Are you allergic to latex? No     Health Care Directive  Patient does not have a Health Care Directive or Living Will: Discussed advance care planning with patient; information given to patient to review. At January 2024 visit    Preoperative Review of    reviewed - no record of controlled substances prescribed.    Status of Chronic Conditions:  See problem list for active medical problems.  Problems all longstanding and stable, except as noted/documented.  See ROS for pertinent symptoms related to these conditions.    Patient Active Problem List    Diagnosis Date Noted    Essential hypertension with goal blood pressure less than 140/90 02/07/2023     Priority: Medium    Tinnitus, bilateral 08/27/2021     Priority: Medium    Benign prostatic hyperplasia with lower urinary tract symptoms, symptom details unspecified 11/02/2020     Priority: Medium    Combined arterial insufficiency and corporo-venous occlusive erectile dysfunction 12/06/2016     Priority: Medium    Photophobia of both eyes 05/17/2016     Priority: Medium    Dysuria 11/12/2014     Priority: Medium    Obesity, Class I, BMI 30-34.9 10/07/2014     Priority: Medium    Elevated prostate specific antigen (PSA) 10/07/2014     Priority: Medium     Negative biopsy  7/7/16      Glaucoma      Priority: Medium     Imo Update utility      Advance care planning 06/26/2012     Priority: Medium     Advance Care Planning 08/21/2015: Discussed advance care planning with patient; information given to patient to review 08/21/2015  Navin Euceda MD   Advance Care Planning 06/26/2012: Discussed advance care planning with patient; information given to patient to review. 6/26/2012  KIM Melton MA                  Anxiety 06/21/2011     Priority: Medium    Oral herpes      Priority: Medium    Hyperlipidemia LDL goal <100 01/04/2010     Priority: Medium     CAD, hypertriglyceridemia      Atherosclerosis of native coronary artery of native heart without angina pectoris 12/30/2009     Priority: Medium     10%  mid-LAD lesion, Dr. Mendoza      Gastroesophageal reflux disease without esophagitis 11/01/2007     Priority: Medium     no Rose's        Past Medical History:   Diagnosis Date    Coronary atherosclerosis of native coronary artery 12/30/09    10%  mid-LAD lesion, Dr. Mendoza    DDD (degenerative disc disease), lumbar     L-4-L5, L5-S1    GERD (gastroesophageal reflux disease) 11/2007    no Rose's    Glaucoma     Glaucoma     Hyperlipidemia LDL goal <100     hyper-TG, on Simv 80mg since at least 1/4/10    Oral herpes      Past Surgical History:   Procedure Laterality Date    ARTHROSCOPY KNEE RT/LT Right 02/22/2008    MMT, Dr. Shaista HADDAD GLAUCOMA SURG,IRIDENCLEISIS  04/01/2003    LASER    C GLAUCOMA SURG,IRIDENCLEISIS  06/01/2006    laser    CARDIAC CATHERIZATION  12/30/2009    10% stenosis of mid LAD, EF 70%, nl otherwise Dr. Elliot Mendoza, [facility]    CATARACT IOL, RT/LT Right 05/26/2016    CATARACT IOL, RT/LT Left 06/2016    COLONOSCOPY WITH CO2 INSUFFLATION N/A 07/09/2018    Procedure: COLONOSCOPY WITH CO2 INSUFFLATION;  Screen for colon cancer;  Surgeon: Navin Vasquez DO;  Location: [facility] OR    COMBINED ESOPHAGOSCOPY, GASTROSCOPY, DUODENOSCOPY (EGD) WITH CO2 INSUFFLATION N/A  06/14/2017    Procedure: COMBINED ESOPHAGOSCOPY, GASTROSCOPY, DUODENOSCOPY (EGD) WITH CO2 INSUFFLATION;  EGD-GASTROESOPHAGEAL REFLUX DISEASE WITHOUT ESOPHAGITIS/ DARNELL;  Surgeon: Navin Vasquez DO;  Location: [facility] OR    ESOPHAGOSCOPY, GASTROSCOPY, DUODENOSCOPY (EGD), COMBINED N/A 06/14/2017    Procedure: COMBINED ESOPHAGOSCOPY, GASTROSCOPY, DUODENOSCOPY (EGD), BIOPSY SINGLE OR MULTIPLE;;  Surgeon: Navin Vasquez DO;  Location: [facility] OR    GLAUCOMA SURGERY      ahmed valve x 1 le x 2 re    GLAUCOMA SURGERY Right 06/16/2015    trabeculectomy with [facility]    HERNIA REPAIR, UMBILICAL  age 6    IR PAE  6/28/2023    LASER SURGERY OF EYE      6-7 lasers [facility]U glaucoma    LASER SURGERY OF EYE Right 05/05/2015    [facility]    PELVIC ARTERY EMBOLIZATION Bilateral 06/28/2023    IR prostatic artery embolization for BPH    PROSTATE BIOPSY, NEEDLE, SATURATION SAMPLING  12/04/2014    Dr. Zaragoza     Current Outpatient Medications   Medication Sig Dispense Refill    aspirin 81 MG tablet Take 1 tablet by mouth daily. * 100 tablet prn    atorvastatin (LIPITOR) 80 MG tablet TAKE 1 TABLET(80 MG) BY MOUTH DAILY FOR CHOLESTEROL 90 tablet 1    FLUoxetine 20 MG tablet Take 1 tablet (20 mg) by mouth daily for anxiety prevention. 90 tablet 1    ketoconazole (NIZORAL) 2 % external cream APPLY TOPICALLY TO SEBORRHEA OF FACE TWICE DAILY AS NEEDED 30 g 1    losartan (COZAAR) 100 MG tablet TAKE 1 TABLET(100 MG) BY MOUTH DAILY FOR BLOOD PRESSURE 90 tablet 2    oxyBUTYnin ER (DITROPAN XL) 5 MG 24 hr tablet Take 1 tablet (5 mg) by mouth daily for overactive bladder. 30 tablet 1    pantoprazole (PROTONIX) 20 MG EC tablet Take 1 tablet (20 mg) by mouth daily for reflux (30-60 minutes before a meal). 90 tablet 3    sildenafil (REVATIO) 20 MG tablet TAKE 1-5 TABS BY MOUTH DAILY AS NEEDED FOR ED. TAKE 1-3 HRS BE4 INTERCOURSE. MAX 1 DOSE/24HRS 30 tablet 4    sulindac (CLINORIL) 150 MG tablet Take 1 tablet (150 mg) by mouth 2 times daily (with meals) as needed for  "hand/wrist pain. 180 tablet 1    valACYclovir (VALTREX) 500 MG tablet TAKE 1 TABLET BY MOUTH EVERY 12 HOURS FOR 5 DAYS. START AT ONSET OF SYMPTOMS 20 tablet 0       Allergies   Allergen Reactions    Gemfibrozil Diarrhea    No Clinical Screening - See Comments      All glaucoma drops except travatan    Pilocarpine Hydrochloride      Eye drops    Timoptic [Timolol Maleate]      Eye drops only    Lisinopril Cough     OK on losartan         Social History     Tobacco Use    Smoking status: Never    Smokeless tobacco: Never    Tobacco comments:     Less than 100 cigars in his lifetime   Substance Use Topics    Alcohol use: Yes     Comment: weekends     Family History   Problem Relation Age of Onset    Glaucoma Mother 87    Cerebrovascular Disease Mother     Snoring Mother     Lymphoma Father          age 73    Glaucoma Father     Cancer Maternal Grandmother     Liver Cancer Other         aunt    Diabetes Other         mother's relatives    C.A.D. No family hx of     Macular Degeneration No family hx of     Hypertension No family hx of     Thyroid Disease No family hx of     Anesthesia Reaction No family hx of     Bleeding Disorder No family hx of     Thrombophilia No family hx of      History   Drug Use No             Review of Systems  Constitutional, neuro, ENT, endocrine, pulmonary, cardiac, gastrointestinal, genitourinary, musculoskeletal, integument and psychiatric systems are negative, except as otherwise noted.    Objective    There were no vitals taken for this visit.   Estimated body mass index is 30.93 kg/m  as calculated from the following:    Height as of 24: 1.793 m (5' 10.59\").    Weight as of 24: 99.4 kg (219 lb 3.2 oz).  Physical Exam  GENERAL: alert and no distress  EYES: Eyes grossly normal to inspection, PERRL and conjunctivae and sclerae normal  HENT: ear canals and TM's normal, nose and mouth without ulcers or lesions  NECK: no adenopathy, no asymmetry, masses, or scars  RESP: lungs " clear to auscultation - no rales, rhonchi or wheezes  CV: regular rate and rhythm, normal S1 S2, no S3 or S4, no murmur, click or rub, no peripheral edema  ABDOMEN: soft, nontender, no hepatosplenomegaly, no masses and bowel sounds normal  MS: no gross musculoskeletal defects noted, no edema  SKIN: no suspicious lesions or rashes  NEURO: Normal strength and tone, mentation intact and speech normal  PSYCH: mentation appears normal, affect normal/bright    Recent Labs   Lab Test 01/16/24  0929 07/18/23  1709 06/28/23  0749   PLT  --   --  211   INR  --   --  1.00     --   --    POTASSIUM 4.9 4.0  --    CR 1.05 0.95 0.91        Diagnostics  Labs pending at this time.  Results will be reviewed when available.   EKG: appears normal, NSR, normal axis, normal intervals, no acute ST/T changes c/w ischemia, no LVH by voltage criteria, unchanged from previous tracings (bradycardia resolved).     Revised Cardiac Risk Index (RCRI)  The patient has the following serious cardiovascular risks for perioperative complications:   - Coronary Artery Disease (MI, positive stress test, angina, Qs on EKG) = 1 point     RCRI Interpretation: 1 point: Class II (low risk - 0.9% complication rate)         Signed Electronically by: Navin Euceda MD    Copy of this evaluation report is provided to requesting physician.      The information in this document, created by the medical scribe for me, accurately reflects the services I personally performed and the decisions made by me. I have reviewed and approved this document for accuracy prior to signature.  Sheila Schrader

## 2024-05-16 LAB
CHOLEST SERPL-MCNC: 151 MG/DL
CREAT SERPL-MCNC: 1.07 MG/DL (ref 0.67–1.17)
EGFRCR SERPLBLD CKD-EPI 2021: 76 ML/MIN/1.73M2
FASTING STATUS PATIENT QL REPORTED: YES
HDLC SERPL-MCNC: 63 MG/DL
LDLC SERPL CALC-MCNC: 61 MG/DL
NONHDLC SERPL-MCNC: 88 MG/DL
POTASSIUM SERPL-SCNC: 4.4 MMOL/L (ref 3.4–5.3)
TRIGL SERPL-MCNC: 136 MG/DL

## 2024-06-11 ENCOUNTER — OFFICE VISIT (OUTPATIENT)
Dept: ORTHOPEDICS | Facility: CLINIC | Age: 69
End: 2024-06-11
Attending: FAMILY MEDICINE
Payer: COMMERCIAL

## 2024-06-11 ENCOUNTER — ANCILLARY PROCEDURE (OUTPATIENT)
Dept: GENERAL RADIOLOGY | Facility: CLINIC | Age: 69
End: 2024-06-11
Attending: FAMILY MEDICINE
Payer: COMMERCIAL

## 2024-06-11 DIAGNOSIS — M19.032 LOCALIZED PRIMARY OSTEOARTHRITIS OF CARPOMETACARPAL (CMC) JOINT OF LEFT WRIST: Primary | ICD-10-CM

## 2024-06-11 DIAGNOSIS — M19.031 LOCALIZED PRIMARY OSTEOARTHRITIS OF CARPOMETACARPAL (CMC) JOINT OF RIGHT WRIST: ICD-10-CM

## 2024-06-11 DIAGNOSIS — M19.032 LOCALIZED PRIMARY OSTEOARTHRITIS OF CARPOMETACARPAL (CMC) JOINT OF LEFT WRIST: ICD-10-CM

## 2024-06-11 PROCEDURE — 73130 X-RAY EXAM OF HAND: CPT | Mod: LT | Performed by: RADIOLOGY

## 2024-06-11 PROCEDURE — 99204 OFFICE O/P NEW MOD 45 MIN: CPT | Performed by: FAMILY MEDICINE

## 2024-06-11 ASSESSMENT — PAIN SCALES - GENERAL: PAINLEVEL: MODERATE PAIN (4)

## 2024-06-11 NOTE — LETTER
6/11/2024      Grey Markham  [address]  [place] [address]      Dear Colleague,    Thank you for referring your patient, Grey Markham, to the [facility] [place]. Please see a copy of my visit note below.        HISTORY OF PRESENT ILLNESS  Mr. Markham is a 68 year old male who presents to clinic today with bilateral pain.  Grey has been experiencing worsening bilateral wrist pain over the past few years with no single inciting event.  He feels pain equally in both wrists, he points to the base of his thumb.  This is worse with gripping large objects, worse with writing.  He has been quite active and is lifting weights, he has also been doing some home exercises and has been using Arnica gel.    Of note, Grey has seen me in the past for his right left wrist.  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    Additional history: as documented    MEDICAL HISTORY  Patient Active Problem List   Diagnosis     Gastroesophageal reflux disease without esophagitis     Atherosclerosis of native coronary artery of native heart without angina pectoris     Hyperlipidemia LDL goal <100     Oral herpes     Anxiety     Advance care planning     Glaucoma     Obesity, Class I, BMI 30-34.9     Elevated prostate specific antigen (PSA)     Dysuria     Photophobia of both eyes     Combined arterial insufficiency and corporo-venous occlusive erectile dysfunction     Benign prostatic hyperplasia with lower urinary tract symptoms, symptom details unspecified     Tinnitus, bilateral     Essential hypertension with goal blood pressure less than 140/90       Current Outpatient Medications   Medication Sig Dispense Refill     aspirin 81 MG tablet Take 1 tablet by mouth daily. * 100 tablet prn     atorvastatin (LIPITOR) 80 MG tablet TAKE 1 TABLET(80 MG) BY MOUTH DAILY FOR CHOLESTEROL 90 tablet 1      FLUoxetine 20 MG tablet Take 1 tablet (20 mg) by mouth daily for anxiety prevention. 90 tablet 1     ketoconazole (NIZORAL) 2 % external cream APPLY TOPICALLY TO SEBORRHEA OF FACE TWICE DAILY AS NEEDED 30 g 1     losartan (COZAAR) 100 MG tablet TAKE 1 TABLET(100 MG) BY MOUTH DAILY FOR BLOOD PRESSURE 90 tablet 2     oxyBUTYnin ER (DITROPAN XL) 5 MG 24 hr tablet Take 1 tablet (5 mg) by mouth daily for overactive bladder. 30 tablet 1     pantoprazole (PROTONIX) 20 MG EC tablet Take 1 tablet (20 mg) by mouth daily for reflux (30-60 minutes before a meal). 90 tablet 3     sildenafil (REVATIO) 20 MG tablet TAKE 1-5 TABS BY MOUTH DAILY AS NEEDED FOR ED. TAKE 1-3 HRS BE4 INTERCOURSE. MAX 1 DOSE/24HRS 30 tablet 4     sulindac (CLINORIL) 150 MG tablet Take 1 tablet (150 mg) by mouth 2 times daily (with meals) as needed for hand/wrist pain. 180 tablet 1     valACYclovir (VALTREX) 500 MG tablet TAKE 1 TABLET BY MOUTH EVERY 12 HOURS FOR 5 DAYS. START AT ONSET OF SYMPTOMS 20 tablet 0       Allergies   Allergen Reactions     Gemfibrozil Diarrhea     No Clinical Screening - See Comments      All glaucoma drops except travatan     Pilocarpine Hydrochloride      Eye drops     Timoptic [Timolol Maleate]      Eye drops only     Lisinopril Cough     OK on losartan        Family History   Problem Relation Age of Onset     Glaucoma Mother 87     Cerebrovascular Disease Mother      Snoring Mother      Lymphoma Father          age 73     Glaucoma Father      Cancer Maternal Grandmother      Liver Cancer Other         aunt     Diabetes Other         mother's relatives     C.A.D. No family hx of      Macular Degeneration No family hx of      Hypertension No family hx of      Thyroid Disease No family hx of      Anesthesia Reaction No family hx of      Bleeding Disorder No family hx of      Thrombophilia No family hx of        Additional medical/Social/Surgical histories reviewed in [facility] and updated as appropriate.        PHYSICAL  EXAM  General  - normal appearance, in no obvious distress    Musculoskeletal - right and let thumb  - inspection: left CMC swelling  - palpation: CMC tenderness  - strength: 5/5  strength, 5/5 wrist abduction, 5/5 flexion, extension, pronation, supination, adduction  Neuro  - no numbness, no motor deficit, grossly normal coordination, normal muscle tone  Skin  - no ecchymosis, erythema, warmth, or induration, no obvious rash             ASSESSMENT & PLAN  Mr. Markham is a 68 year old male who presents to clinic today with bilateral wrist pain.    I ordered and independently reviewed an xray of his wrist, this reveals worsening bilateral CMC and triscaphe osteoarthritis, along with widening of the left scapholunate interval.    I had a good discussion with Grey centering around these issues, we discussed pathophysiology and treatment strategies in some depth.  I am referring him back to hand therapy, I do think that focused treatment and exercises to bolster his CMC joint will benefit him.  I also think he may benefit from some custom braces, if deemed appropriate by his hand therapist.  We also discussed diclofenac gel, which he can use in place of or in addition to Arnica.    If he is not experiencing any relief whatsoever I would likely recommend MRI imaging in the future to assess his scapholunate ligament.    It was a pleasure seeing Grey today.    Randolph Zuleta DO, CAM  Primary Care Sports Medicine      This note was constructed using Dragon dictation software, please excuse any minor errors in spelling, grammar, or syntax.      Again, thank you for allowing me to participate in the care of your patient.        Sincerely,        Randolph Zuleta DO

## 2024-06-11 NOTE — NURSING NOTE
Chief Complaint   Patient presents with    Right Wrist - New Patient, Pain    Left Wrist - New Patient, Pain       There were no vitals filed for this visit.    There is no height or weight on file to calculate BMI.      ORIN Magana NREMT

## 2024-06-11 NOTE — PROGRESS NOTES
HISTORY OF PRESENT ILLNESS  Mr. Markham is a 68 year old male who presents to clinic today with bilateral pain.  Grey has been experiencing worsening bilateral wrist pain over the past few years with no single inciting event.  He feels pain equally in both wrists, he points to the base of his thumb.  This is worse with gripping large objects, worse with writing.  He has been quite active and is lifting weights, he has also been doing some home exercises and has been using Arnica gel.    Of note, Grey has seen me in the past for his right left wrist.  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    Additional history: as documented    MEDICAL HISTORY  Patient Active Problem List   Diagnosis    Gastroesophageal reflux disease without esophagitis    Atherosclerosis of native coronary artery of native heart without angina pectoris    Hyperlipidemia LDL goal <100    Oral herpes    Anxiety    Advance care planning    Glaucoma    Obesity, Class I, BMI 30-34.9    Elevated prostate specific antigen (PSA)    Dysuria    Photophobia of both eyes    Combined arterial insufficiency and corporo-venous occlusive erectile dysfunction    Benign prostatic hyperplasia with lower urinary tract symptoms, symptom details unspecified    Tinnitus, bilateral    Essential hypertension with goal blood pressure less than 140/90       Current Outpatient Medications   Medication Sig Dispense Refill    aspirin 81 MG tablet Take 1 tablet by mouth daily. * 100 tablet prn    atorvastatin (LIPITOR) 80 MG tablet TAKE 1 TABLET(80 MG) BY MOUTH DAILY FOR CHOLESTEROL 90 tablet 1    FLUoxetine 20 MG tablet Take 1 tablet (20 mg) by mouth daily for anxiety prevention. 90 tablet 1    ketoconazole (NIZORAL) 2 % external cream APPLY TOPICALLY TO SEBORRHEA OF FACE TWICE DAILY AS NEEDED 30 g 1    losartan (COZAAR) 100 MG tablet TAKE 1 TABLET(100 MG) BY MOUTH  DAILY FOR BLOOD PRESSURE 90 tablet 2    oxyBUTYnin ER (DITROPAN XL) 5 MG 24 hr tablet Take 1 tablet (5 mg) by mouth daily for overactive bladder. 30 tablet 1    pantoprazole (PROTONIX) 20 MG EC tablet Take 1 tablet (20 mg) by mouth daily for reflux (30-60 minutes before a meal). 90 tablet 3    sildenafil (REVATIO) 20 MG tablet TAKE 1-5 TABS BY MOUTH DAILY AS NEEDED FOR ED. TAKE 1-3 HRS BE4 INTERCOURSE. MAX 1 DOSE/24HRS 30 tablet 4    sulindac (CLINORIL) 150 MG tablet Take 1 tablet (150 mg) by mouth 2 times daily (with meals) as needed for hand/wrist pain. 180 tablet 1    valACYclovir (VALTREX) 500 MG tablet TAKE 1 TABLET BY MOUTH EVERY 12 HOURS FOR 5 DAYS. START AT ONSET OF SYMPTOMS 20 tablet 0       Allergies   Allergen Reactions    Gemfibrozil Diarrhea    No Clinical Screening - See Comments      All glaucoma drops except travatan    Pilocarpine Hydrochloride      Eye drops    Timoptic [Timolol Maleate]      Eye drops only    Lisinopril Cough     OK on losartan        Family History   Problem Relation Age of Onset    Glaucoma Mother 87    Cerebrovascular Disease Mother     Snoring Mother     Lymphoma Father          age 73    Glaucoma Father     Cancer Maternal Grandmother     Liver Cancer Other         aunt    Diabetes Other         mother's relatives    C.A.D. No family hx of     Macular Degeneration No family hx of     Hypertension No family hx of     Thyroid Disease No family hx of     Anesthesia Reaction No family hx of     Bleeding Disorder No family hx of     Thrombophilia No family hx of        Additional medical/Social/Surgical histories reviewed in EPIC and updated as appropriate.        PHYSICAL EXAM  General  - normal appearance, in no obvious distress    Musculoskeletal - right and let thumb  - inspection: left CMC swelling  - palpation: CMC tenderness  - strength: 5/5  strength, 5/5 wrist abduction, 5/5 flexion, extension, pronation, supination, adduction  Neuro  - no numbness, no motor  deficit, grossly normal coordination, normal muscle tone  Skin  - no ecchymosis, erythema, warmth, or induration, no obvious rash             ASSESSMENT & PLAN  Mr. Markham is a 68 year old male who presents to clinic today with bilateral wrist pain.    I ordered and independently reviewed an xray of his wrist, this reveals worsening bilateral CMC and triscaphe osteoarthritis, along with widening of the left scapholunate interval.    I had a good discussion with Grey centering around these issues, we discussed pathophysiology and treatment strategies in some depth.  I am referring him back to hand therapy, I do think that focused treatment and exercises to bolster his CMC joint will benefit him.  I also think he may benefit from some custom braces, if deemed appropriate by his hand therapist.  We also discussed diclofenac gel, which he can use in place of or in addition to Arnica.    If he is not experiencing any relief whatsoever I would likely recommend MRI imaging in the future to assess his scapholunate ligament.    It was a pleasure seeing Grey today.    Randolph Zuleta DO, CAM  Primary [facility] Sports Medicine      This note was constructed using Dragon dictation software, please excuse any minor errors in spelling, grammar, or syntax.

## 2024-06-12 ENCOUNTER — OFFICE VISIT (OUTPATIENT)
Dept: PODIATRY | Facility: CLINIC | Age: 69
End: 2024-06-12
Attending: FAMILY MEDICINE
Payer: COMMERCIAL

## 2024-06-12 DIAGNOSIS — M21.619 BUNION: ICD-10-CM

## 2024-06-12 DIAGNOSIS — R20.2 PARESTHESIA OF RIGHT FOOT: ICD-10-CM

## 2024-06-12 DIAGNOSIS — M77.8 CAPSULITIS OF FOOT, RIGHT: Primary | ICD-10-CM

## 2024-06-12 PROBLEM — R39.15 URINARY URGENCY: Status: ACTIVE | Noted: 2024-02-28

## 2024-06-12 PROBLEM — N40.1 LOWER URINARY TRACT SYMPTOMS DUE TO BENIGN PROSTATIC HYPERPLASIA: Status: ACTIVE | Noted: 2024-02-28

## 2024-06-12 PROCEDURE — 99203 OFFICE O/P NEW LOW 30 MIN: CPT | Performed by: PODIATRIST

## 2024-06-12 NOTE — PROGRESS NOTES
S:  Patient seen today in consult from Dr. Euceda and complains of chronic right foot numbness.  Has had this for 3 years.  Slowly worse.  Denies weakness.  Denies increased deformity.  He is concerned that they may have diabetes or peripheral neuropathy.  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  Also has bunion on this foot.  Rarely painful.  He is wondering about treatment options for this.  Does occasionally get pain in his right foot.  Points to second metatarsal head.  Aggravated by activity and relieved by rest.  This is not constant.  Mother had history of of cerebrovascular disease.    ROS:  A 10-point review of systems was performed and is positive for that noted in the HPI and as seen above.  All other areas are negative.        Allergies   Allergen Reactions    Gemfibrozil Diarrhea    No Clinical Screening - See Comments      All glaucoma drops except travatan    Pilocarpine Hydrochloride      Eye drops    Timoptic [Timolol Maleate]      Eye drops only    Lisinopril Cough     OK on losartan        Current Outpatient Medications   Medication Sig Dispense Refill    aspirin 81 MG tablet Take 1 tablet by mouth daily. * 100 tablet prn    atorvastatin (LIPITOR) 80 MG tablet TAKE 1 TABLET(80 MG) BY MOUTH DAILY FOR CHOLESTEROL 90 tablet 1    FLUoxetine 20 MG tablet Take 1 tablet (20 mg) by mouth daily for anxiety prevention. 90 tablet 1    ketoconazole (NIZORAL) 2 % external cream APPLY TOPICALLY TO SEBORRHEA OF FACE TWICE DAILY AS NEEDED 30 g 1    losartan (COZAAR) 100 MG tablet TAKE 1 TABLET(100 MG) BY MOUTH DAILY FOR BLOOD PRESSURE 90 tablet 2    oxyBUTYnin ER (DITROPAN XL) 5 MG 24 hr tablet Take 1 tablet (5 mg) by mouth daily for overactive bladder. 30 tablet 1    pantoprazole (PROTONIX) 20 MG EC tablet Take 1 tablet (20 mg) by mouth daily for reflux (30-60 minutes before a meal). 90 tablet 3    sildenafil (REVATIO) 20 MG tablet TAKE 1-5 TABS BY MOUTH DAILY AS NEEDED FOR ED. TAKE 1-3 HRS BE4 INTERCOURSE.  MAX 1 DOSE/24HRS 30 tablet 4    sulindac (CLINORIL) 150 MG tablet Take 1 tablet (150 mg) by mouth 2 times daily (with meals) as needed for hand/wrist pain. 180 tablet 1    valACYclovir (VALTREX) 500 MG tablet TAKE 1 TABLET BY MOUTH EVERY 12 HOURS FOR 5 DAYS. START AT ONSET OF SYMPTOMS 20 tablet 0       Patient Active Problem List   Diagnosis    Gastroesophageal reflux disease without esophagitis    Atherosclerosis of native coronary artery of native heart without angina pectoris    Hyperlipidemia LDL goal <100    Oral herpes    Anxiety    Advance care planning    Glaucoma    Obesity, Class I, BMI 30-34.9    Elevated prostate specific antigen (PSA)    Dysuria    Photophobia of both eyes    Combined arterial insufficiency and corporo-venous occlusive erectile dysfunction    Benign prostatic hyperplasia with lower urinary tract symptoms, symptom details unspecified    Tinnitus, bilateral    Essential hypertension with goal blood pressure less than 140/90    Lower urinary tract symptoms due to benign prostatic hyperplasia    Urinary urgency       Past Medical History:   Diagnosis Date    Coronary atherosclerosis of native coronary artery 12/30/09    10%  mid-LAD lesion, Dr. Mendoza    DDD (degenerative disc disease), lumbar     L-4-L5, L5-S1    GERD (gastroesophageal reflux disease) 11/2007    no Rose's    Glaucoma     Glaucoma     Hyperlipidemia LDL goal <100     hyper-TG, on Simv 80mg since at least 1/4/10    Oral herpes        Past Surgical History:   Procedure Laterality Date    ARTHROSCOPY KNEE RT/LT Right 02/22/2008    [facility], Dr. Shaista HADDAD GLAUCOMA SURG,IRIDENCLEISIS  04/01/2003    LASER    C GLAUCOMA SURG,IRIDENCLEISIS  06/01/2006    laser    CARDIAC CATHERIZATION  12/30/2009    10% stenosis of mid LAD, EF 70%, nl otherwise Dr. Elliot Mendoza, [facility]    CATARACT IOL, RT/LT Right 05/26/2016    CATARACT IOL, RT/LT Left 06/2016    COLONOSCOPY WITH CO2 INSUFFLATION N/A 07/09/2018    Procedure: COLONOSCOPY WITH  CO2 INSUFFLATION;  Screen for colon cancer;  Surgeon: Navin Vasquez DO;  Location: [facility]    COMBINED ESOPHAGOSCOPY, GASTROSCOPY, DUODENOSCOPY (EGD) WITH CO2 INSUFFLATION N/A 2017    Procedure: COMBINED ESOPHAGOSCOPY, GASTROSCOPY, DUODENOSCOPY (EGD) WITH CO2 INSUFFLATION;  EGD-GASTROESOPHAGEAL REFLUX DISEASE WITHOUT ESOPHAGITIS/ DARNELL;  Surgeon: Navin Vasquez DO;  Location: [facility]    ESOPHAGOSCOPY, GASTROSCOPY, DUODENOSCOPY (EGD), COMBINED N/A 2017    Procedure: COMBINED ESOPHAGOSCOPY, GASTROSCOPY, DUODENOSCOPY (EGD), BIOPSY SINGLE OR MULTIPLE;;  Surgeon: Navin Vasquez DO;  Location: [facility]    GLAUCOMA SURGERY      ahmed valve x 1 le x 2 re    GLAUCOMA SURGERY Right 2015    trabeculectomy with [facility]    HERNIA REPAIR, UMBILICAL  age 6    IR PAE  2023    LASER SURGERY OF EYE      6-7 lasers [facility] glaucoma    LASER SURGERY OF EYE Right 2015    [facility]    PELVIC ARTERY EMBOLIZATION Bilateral 2023    IR prostatic artery embolization for BPH    PROSTATE BIOPSY, NEEDLE, SATURATION SAMPLING  2014    Dr. Zaragoza       Family History   Problem Relation Age of Onset    Glaucoma Mother 87    Cerebrovascular Disease Mother     Snoring Mother     Lymphoma Father          age 73    Glaucoma Father     Cancer Maternal Grandmother     Liver Cancer Other         aunt    Diabetes Other         mother's relatives    C.A.D. No family hx of     Macular Degeneration No family hx of     Hypertension No family hx of     Thyroid Disease No family hx of     Anesthesia Reaction No family hx of     Bleeding Disorder No family hx of     Thrombophilia No family hx of        Social History     Tobacco Use    Smoking status: Never    Smokeless tobacco: Never    Tobacco comments:     Less than 100 cigars in his lifetime   Substance Use Topics    Alcohol use: Yes     Comment: weekends         O:   There were no vitals taken for this visit..      Constitutional/ general:  Pt is in no apparent distress,  appears well-nourished.  Cooperative with history and physical exam.     Psych:  The patient answered questions appropriately.  Normal affect.  Seems to have reasonable expectations, in terms of treatment.     Eyes:  Visual scanning/ tracking without deficit.     Ears:  Response to auditory stimuli is normal.  negative hearing aid devices.  Auricles in proper alignment.     Lymphatic:  Popliteal lymph nodes not enlarged.     Lungs:  Non labored breathing, non labored speech. No cough.  No audible wheezing. Even, quiet breathing.       Vascular:  Pedal pulses are palpable bilaterally for both the DP and PT arteries.  CFT < 3 sec.  Slight ankle edema.  Pedal hair growth noted.     Neuro:  Alert and oriented x 3. Coordinated gait.      Derm: Normal texture and turgor.  No erythema, ecchymosis, or cyanosis.  No open lesions.     Musculoskeletal:    Lower extremity muscle strength is normal.  Patient is ambulatory without an assistive device or brace.  Normal arch with weightbearing.  Right bunion noted.  Decreased range of motion and track bound.  MS 5/5 all compartments.  Normal ROM all fore foot and rearfoot joints.  No equinus.  Long second and third metatarsal heads.  No pain at this time.  No masses.  Negative Lachman's test.  Negative Puma's click.  Numbness plantar portion of right second and third toes.  No numbness anywhere else on right toes or on left foot at all.    Radiographic Exam:    XR Foot Rt 3+ Views  Order: [id number]  Narrative    Three views weightbearing right foot.  Patient with a history of painful foot deformity.  Noted is significant  increase of 1st IM angle to approximately 19 degrees.  HV angles are  significantly increased. There is lateral drift to the sesamoid apparatus.   There is bony prominence 1st metatarsal head medially.  No other  significant osseous abnormality is noted.  Radiographic impression:  Severe hallux abductovalgus, right foot.        Component  Ref Range & Units 3 yr  ago 4 yr ago 7 yr ago 9 yr ago 14 yr ago    Hemoglobin A1C  0 - 5.6 % 5.6 5.8 High  CM 5.8 R 5.7 R 5.7 R       A:    Right second and third toes plantar numbness  Right subsecond capsulitis  Right bunion deformity    P:  X rays personally reviewed.  Reviewed recent labs.  Reviewed past podiatry and primary care notes.  Discussed cause of bunion deformity.  Since no pain we will continue to watch.  If had surgery would need fusion.  We discussed this in recovery.  He would rather just watch this for now.  Explained all the forces that cause overload of right subsecond and third met heads.  Discussed how this can cause numbness.  Discussed the same forces can cause subsecond capsulitis.  Discussed stiff shoes at all times both inside and outside.  Ice twice daily for 20 minutes.  Dispensed metatarsal pad and dancers pad to offload this.  Discussed since so longstanding numbness probably permanent but perhaps will be less bothersome with good shoes.  RTC as needed.  Thank you for allowing me participate in the care of this patient.        Carlos Garcia, HAL, FACFAS

## 2024-06-12 NOTE — LETTER
6/12/2024      Grey Markham  [address]  [place] [address]      Dear Colleague,    Thank you for referring your patient, Grey Markham, to the [facility]. Please see a copy of my visit note below.    S:  Patient seen today in consult from Dr. Euceda and complains of chronic right foot numbness.  Has had this for 3 years.  Slowly worse.  Denies weakness.  Denies increased deformity.  He is concerned that they may have diabetes or peripheral neuropathy.  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  Also has bunion on this foot.  Rarely painful.  He is wondering about treatment options for this.  Does occasionally get pain in his right foot.  Points to second metatarsal head.  Aggravated by activity and relieved by rest.  This is not constant.  Mother had history of of cerebrovascular disease.    ROS:  A 10-point review of systems was performed and is positive for that noted in the HPI and as seen above.  All other areas are negative.        Allergies   Allergen Reactions     Gemfibrozil Diarrhea     No Clinical Screening - See Comments      All glaucoma drops except travatan     Pilocarpine Hydrochloride      Eye drops     Timoptic [Timolol Maleate]      Eye drops only     Lisinopril Cough     OK on losartan        Current Outpatient Medications   Medication Sig Dispense Refill     aspirin 81 MG tablet Take 1 tablet by mouth daily. * 100 tablet prn     atorvastatin (LIPITOR) 80 MG tablet TAKE 1 TABLET(80 MG) BY MOUTH DAILY FOR CHOLESTEROL 90 tablet 1     FLUoxetine 20 MG tablet Take 1 tablet (20 mg) by mouth daily for anxiety prevention. 90 tablet 1     ketoconazole (NIZORAL) 2 % external cream APPLY TOPICALLY TO SEBORRHEA OF FACE TWICE DAILY AS NEEDED 30 g 1     losartan (COZAAR) 100 MG tablet TAKE 1 TABLET(100 MG) BY MOUTH DAILY FOR BLOOD PRESSURE 90 tablet 2     oxyBUTYnin ER (DITROPAN XL) 5 MG 24 hr tablet Take 1 tablet (5 mg) by mouth daily for overactive bladder. 30  tablet 1     pantoprazole (PROTONIX) 20 MG EC tablet Take 1 tablet (20 mg) by mouth daily for reflux (30-60 minutes before a meal). 90 tablet 3     sildenafil (REVATIO) 20 MG tablet TAKE 1-5 TABS BY MOUTH DAILY AS NEEDED FOR ED. TAKE 1-3 HRS BE4 INTERCOURSE. MAX 1 DOSE/24HRS 30 tablet 4     sulindac (CLINORIL) 150 MG tablet Take 1 tablet (150 mg) by mouth 2 times daily (with meals) as needed for hand/wrist pain. 180 tablet 1     valACYclovir (VALTREX) 500 MG tablet TAKE 1 TABLET BY MOUTH EVERY 12 HOURS FOR 5 DAYS. START AT ONSET OF SYMPTOMS 20 tablet 0       Patient Active Problem List   Diagnosis     Gastroesophageal reflux disease without esophagitis     Atherosclerosis of native coronary artery of native heart without angina pectoris     Hyperlipidemia LDL goal <100     Oral herpes     Anxiety     Advance care planning     Glaucoma     Obesity, Class I, BMI 30-34.9     Elevated prostate specific antigen (PSA)     Dysuria     Photophobia of both eyes     Combined arterial insufficiency and corporo-venous occlusive erectile dysfunction     Benign prostatic hyperplasia with lower urinary tract symptoms, symptom details unspecified     Tinnitus, bilateral     Essential hypertension with goal blood pressure less than 140/90     Lower urinary tract symptoms due to benign prostatic hyperplasia     Urinary urgency       Past Medical History:   Diagnosis Date     Coronary atherosclerosis of native coronary artery 12/30/09    10%  mid-LAD lesion, Dr. Mendoza     DDD (degenerative disc disease), lumbar     L-4-L5, L5-S1     GERD (gastroesophageal reflux disease) 11/2007    no Rose's     Glaucoma      Glaucoma      Hyperlipidemia LDL goal <100     hyper-TG, on Simv 80mg since at least 1/4/10     Oral herpes        Past Surgical History:   Procedure Laterality Date     ARTHROSCOPY KNEE RT/LT Right 02/22/2008    [facility], Dr. Noonan     C GLAUCOMA SURG,IRIDENCLEISIS  04/01/2003    LASER     C GLAUCOMA SURG,IRIDENCLEISIS   2006    laser     CARDIAC CATHERIZATION  2009    10% stenosis of mid LAD, EF 70%, nl otherwise Dr. Elliot Mendoza, [facility]     CATARACT IOL, RT/LT Right 2016     CATARACT IOL, RT/LT Left 2016     COLONOSCOPY WITH CO2 INSUFFLATION N/A 2018    Procedure: COLONOSCOPY WITH CO2 INSUFFLATION;  Screen for colon cancer;  Surgeon: Navin Vasquez DO;  Location: [facility]     COMBINED ESOPHAGOSCOPY, GASTROSCOPY, DUODENOSCOPY (EGD) WITH CO2 INSUFFLATION N/A 2017    Procedure: COMBINED ESOPHAGOSCOPY, GASTROSCOPY, DUODENOSCOPY (EGD) WITH CO2 INSUFFLATION;  EGD-GASTROESOPHAGEAL REFLUX DISEASE WITHOUT ESOPHAGITIS/ DARNELL;  Surgeon: Navin Vasquez DO;  Location: [facility]     ESOPHAGOSCOPY, GASTROSCOPY, DUODENOSCOPY (EGD), COMBINED N/A 2017    Procedure: COMBINED ESOPHAGOSCOPY, GASTROSCOPY, DUODENOSCOPY (EGD), BIOPSY SINGLE OR MULTIPLE;;  Surgeon: Navin Vasquez DO;  Location: [facility]     GLAUCOMA SURGERY      ahmed valve x 1 le x 2 re     GLAUCOMA SURGERY Right 2015    trabeculectomy with [facility]     HERNIA REPAIR, UMBILICAL  age 6     IR PAE  2023     LASER SURGERY OF EYE      6-7 lasers [facility] glaucoma     LASER SURGERY OF EYE Right 2015    [facility]     PELVIC ARTERY EMBOLIZATION Bilateral 2023    IR prostatic artery embolization for BPH     PROSTATE BIOPSY, NEEDLE, SATURATION SAMPLING  2014    Dr. Zaragoza       Family History   Problem Relation Age of Onset     Glaucoma Mother 87     Cerebrovascular Disease Mother      Snoring Mother      Lymphoma Father          age 73     Glaucoma Father      Cancer Maternal Grandmother      Liver Cancer Other         aunt     Diabetes Other         mother's relatives     C.A.D. No family hx of      Macular Degeneration No family hx of      Hypertension No family hx of      Thyroid Disease No family hx of      Anesthesia Reaction No family hx of      Bleeding Disorder No family hx of      Thrombophilia No family hx of        Social  History     Tobacco Use     Smoking status: Never     Smokeless tobacco: Never     Tobacco comments:     Less than 100 cigars in his lifetime   Substance Use Topics     Alcohol use: Yes     Comment: weekends         O:   There were no vitals taken for this visit..      Constitutional/ general:  Pt is in no apparent distress, appears well-nourished.  Cooperative with history and physical exam.     Psych:  The patient answered questions appropriately.  Normal affect.  Seems to have reasonable expectations, in terms of treatment.     Eyes:  Visual scanning/ tracking without deficit.     Ears:  Response to auditory stimuli is normal.  negative hearing aid devices.  Auricles in proper alignment.     Lymphatic:  Popliteal lymph nodes not enlarged.     Lungs:  Non labored breathing, non labored speech. No cough.  No audible wheezing. Even, quiet breathing.       Vascular:  Pedal pulses are palpable bilaterally for both the DP and PT arteries.  CFT < 3 sec.  Slight ankle edema.  Pedal hair growth noted.     Neuro:  Alert and oriented x 3. Coordinated gait.      Derm: Normal texture and turgor.  No erythema, ecchymosis, or cyanosis.  No open lesions.     Musculoskeletal:    Lower extremity muscle strength is normal.  Patient is ambulatory without an assistive device or brace.  Normal arch with weightbearing.  Right bunion noted.  Decreased range of motion and track bound.  MS 5/5 all compartments.  Normal ROM all fore foot and rearfoot joints.  No equinus.  Long second and third metatarsal heads.  No pain at this time.  No masses.  Negative Lachman's test.  Negative Puma's click.  Numbness plantar portion of right second and third toes.  No numbness anywhere else on right toes or on left foot at all.    Radiographic Exam:    XR Foot Rt 3+ Views  Order: [id number]  Narrative    Three views weightbearing right foot.  Patient with a history of painful foot deformity.  Noted is significant  increase of 1st IM angle to  approximately 19 degrees.  HV angles are  significantly increased. There is lateral drift to the sesamoid apparatus.   There is bony prominence 1st metatarsal head medially.  No other  significant osseous abnormality is noted.  Radiographic impression:  Severe hallux abductovalgus, right foot.        Component  Ref Range & Units 3 yr ago 4 yr ago 7 yr ago 9 yr ago 14 yr ago    Hemoglobin A1C  0 - 5.6 % 5.6 5.8 High  CM 5.8 R 5.7 R 5.7 R       A:    Right second and third toes plantar numbness  Right subsecond capsulitis  Right bunion deformity    P:  X rays personally reviewed.  Reviewed recent labs.  Reviewed past podiatry and primary care notes.  Discussed cause of bunion deformity.  Since no pain we will continue to watch.  If had surgery would need fusion.  We discussed this in recovery.  He would rather just watch this for now.  Explained all the forces that cause overload of right subsecond and third met heads.  Discussed how this can cause numbness.  Discussed the same forces can cause subsecond capsulitis.  Discussed stiff shoes at all times both inside and outside.  Ice twice daily for 20 minutes.  Dispensed metatarsal pad and dancers pad to offload this.  Discussed since so longstanding numbness probably permanent but perhaps will be less bothersome with good shoes.  RTC as needed.  Thank you for allowing me participate in the care of this patient.        Carlos Garcia DPM, FACFAS       Again, thank you for allowing me to participate in the care of your patient.        Sincerely,        Carlos Garcia DPM

## 2024-07-16 ENCOUNTER — THERAPY VISIT (OUTPATIENT)
Dept: OCCUPATIONAL THERAPY | Facility: CLINIC | Age: 69
End: 2024-07-16
Attending: FAMILY MEDICINE
Payer: COMMERCIAL

## 2024-07-16 DIAGNOSIS — M25.531 PAIN IN BOTH WRISTS: Primary | ICD-10-CM

## 2024-07-16 DIAGNOSIS — M19.032 LOCALIZED PRIMARY OSTEOARTHRITIS OF CARPOMETACARPAL (CMC) JOINT OF LEFT WRIST: ICD-10-CM

## 2024-07-16 DIAGNOSIS — M25.532 PAIN IN BOTH WRISTS: Primary | ICD-10-CM

## 2024-07-16 DIAGNOSIS — M19.031 LOCALIZED PRIMARY OSTEOARTHRITIS OF CARPOMETACARPAL (CMC) JOINT OF RIGHT WRIST: ICD-10-CM

## 2024-07-16 PROBLEM — M79.644 BILATERAL THUMB PAIN: Status: ACTIVE | Noted: 2024-07-16

## 2024-07-16 PROBLEM — M79.645 BILATERAL THUMB PAIN: Status: ACTIVE | Noted: 2024-07-16

## 2024-07-16 PROCEDURE — 97530 THERAPEUTIC ACTIVITIES: CPT | Mod: GO

## 2024-07-16 PROCEDURE — 97760 ORTHOTIC MGMT&TRAING 1ST ENC: CPT | Mod: GO

## 2024-07-16 PROCEDURE — 97165 OT EVAL LOW COMPLEX 30 MIN: CPT | Mod: GO

## 2024-07-16 NOTE — PROGRESS NOTES
OCCUPATIONAL THERAPY EVALUATION  Type of Visit: Evaluation     Fall Risk Screen:  Fall screen completed by: OT  Have you fallen 2 or more times in the past year?: No  Have you fallen and had an injury in the past year?: No  Is patient a fall risk?: No    Subjective      Presenting condition or subjective complaint: hand/wrist pain  Date of onset: 06/11/24 (DO Order Date)    Relevant medical history: Bladder or bowel problems; High blood pressure   Past Surgical History:   Procedure Laterality Date    ARTHROSCOPY KNEE RT/LT Right 02/22/2008    [facility], Dr. Shaista HADDAD GLAUCOMA SURG,IRIDENCLEISIS  04/01/2003    LASER    C GLAUCOMA SURG,IRIDENCLEISIS  06/01/2006    laser    CARDIAC CATHERIZATION  12/30/2009    10% stenosis of mid LAD, EF 70%, nl otherwise Dr. Elliot Mendoza, [facility]    CATARACT IOL, RT/LT Right 05/26/2016    CATARACT IOL, RT/LT Left 06/2016    COLONOSCOPY WITH CO2 INSUFFLATION N/A 07/09/2018    Procedure: COLONOSCOPY WITH CO2 INSUFFLATION;  Screen for colon cancer;  Surgeon: Navin Vasquez DO;  Location: [facility]    COMBINED ESOPHAGOSCOPY, GASTROSCOPY, DUODENOSCOPY (EGD) WITH CO2 INSUFFLATION N/A 06/14/2017    Procedure: COMBINED ESOPHAGOSCOPY, GASTROSCOPY, DUODENOSCOPY (EGD) WITH CO2 INSUFFLATION;  EGD-GASTROESOPHAGEAL REFLUX DISEASE WITHOUT ESOPHAGITIS/ DARNELL;  Surgeon: Navin Vasquez DO;  Location: [facility]    ESOPHAGOSCOPY, GASTROSCOPY, DUODENOSCOPY (EGD), COMBINED N/A 06/14/2017    Procedure: COMBINED ESOPHAGOSCOPY, GASTROSCOPY, DUODENOSCOPY (EGD), BIOPSY SINGLE OR MULTIPLE;;  Surgeon: Navin Vasquez DO;  Location: [facility]    GLAUCOMA SURGERY      ahmed valve x 1 le x 2 re    GLAUCOMA SURGERY Right 06/16/2015    trabeculectomy with [facility]    HERNIA REPAIR, UMBILICAL  age 6    IR PAE  6/28/2023    LASER SURGERY OF EYE      6-7 lasers [facility] glaucoma    LASER SURGERY OF EYE Right 05/05/2015    [facility]    PELVIC ARTERY EMBOLIZATION Bilateral 06/28/2023    IR prostatic artery embolization for BPH    PROSTATE  BIOPSY, NEEDLE, SATURATION SAMPLING  12/04/2014    Dr. Zaragoza     Prior diagnostic imaging/testing results: X-ray     Prior therapy history for the same diagnosis, illness or injury: No      Prior Level of Function  Transfers: Independent  Ambulation: Independent  ADL: Independent  IADL:  Independent    Living Environment  Social support: With a significant other or spouse   Type of home: House; Basement   Stairs to enter the home: Yes 3 Is there a railing: Yes     Ramp: No   Stairs inside the home: Yes 11 Is there a railing: Yes     Help at home: None  Equipment owned:   None listed    Employment: No    Hobbies/Interests: music reading football biking    Patient goals for therapy: Want to find holistic ways to lesson pain/deterioration; I am able to do wabt I want, but have pain.do evewryhti    Pain assessment: Pain present  See objective evaluation for additional pain details     Objective   ADDITIONAL HISTORY:  Left hand dominant  Patient reports symptoms of pain and stiffness/loss of motion  Transportation: [facility]    Functional Outcome Measure:   Upper Extremity Functional Index Score:  SCORE:   Column Totals: /80: 74   (A lower score indicates greater disability.)    PAIN:  Pain Level at Rest: 0/10  Pain Level with Use: 6/10  Pain Location: B thumbs, thenars, webspace, dorsal and central wrists, L LF  Pain Quality: Aching, Dull, and Shooting  Pain Frequency: intermittent or daily  Pain is Worst: daytime  Pain is Exacerbated By: pinching, opening a jar, writing, some exercises  Pain is Relieved By: NSAIDs and Voltaren  Pain Progression: Unchanged    EDEMA: Mild     SENSATION: WNL throughout all nerve distributions; per patient report     ROM:   Wrist ROM  Left AROM Right AROM    Extension 57 64   Flexion 38 39   Radial Deviation (RD) 13 14   Ulnar Deviation (UD) 25 25   Supination 71 71   Pronation 85 85     Thumb ROM  Left AROM Right AROM    MP Joint /28 /51   IP Joint  /64 /64   Radial Abduction 45 34    Palmar Abduction 42 37   Kapandji Opposition Scale (0-10/10) 10 10   Per observation, both hands can make full composite fists.     OBSERVATIONS/APPEARANCE:   Thumb Left Right   Shoulder deformity present at CMC joint + +   Edema over CMC joint - -   Noted collapse of MP Joint into hyperextension during pinch ++ ++      SPECIAL TESTS:   CMC OA Provocative Tests  Pain Report Left Right   Thumb Adduction Stress Test 0/10 2/10   C Thumb Extension Stress Test 1/10 1/10   Finkelstein's Test 0/10 0/10   WHAT Test 3/10 1/10     STRENGTH:     Measured in pounds 7/16/2024 7/16/2024    Left Right   Trial 1 64++ 71     Lateral Pinch  Measured in pounds 7/16/2024 7/16/2024    Left Right   Trial 1 18 18     3 Point Pinch  Measured in pounds 7/16/2024 7/16/2024    Left Right   Trial 1 13+ 16+     PALPATION:   Thumb Palpation Left Right   Thumb CMC Joint 3/10 5/10   Thenar [place] 0/10 0/10   Web Space 2/10 0/10   1st Dorsal Compartment 0/10 0/10   Radial Styloid 1/10 1/10   FCR Insertion 1/10 3/10     Wrist Palpation Left  Right   DRUJ 0/10 0/10   Ulna Styloid 0/10 0/10   TFCC 0/10 0/10   Volar Scaphoid 0/10 0/10   Dorsal Scaphoid 3/10 2/10   Volar Lunate 0/10 0/10   Dorsal Lunate 3/10 1/10     Assessment & Plan   CLINICAL IMPRESSIONS  Medical Diagnosis: B CMC OA    Treatment Diagnosis: B wrist pain    Impression/Assessment: Pt is a 68 year old male presenting to Occupational Therapy due to B CMC OA and wrist pain.  The following significant findings have been identified: Impaired ROM, Impaired strength, and Pain.  These identified deficits interfere with their ability to perform meal planning and preparation as compared to previous level of function.   Patient's limitations or Problem List includes: Pain, Decreased ROM/motion, Weakness, Decreased stability, Decreased , Decreased pinch, Tightness in musculature, and Adherence in connective tissue of the bilateral wrist which interferes with the patient's ability to  perform Household Chores as compared to previous level of function.    Clinical Decision Making (Complexity):  Assessment of Occupational Performance: 1-3 Performance Deficits  Occupational Performance Limitations: meal preparation and cleanup  Clinical Decision Making (Complexity): Low complexity    PLAN OF CARE  Treatment Interventions:  Modalities:  US and Paraffin  Therapeutic Exercise:  AROM, AAROM, PROM, Tendon Gliding, Blocking, Reverse Blocking, Place and Hold, Contract Relax, Extensor Tracking, Isotonics, Isometrics, and Stabilization  Neuromuscular re-education:  Kinesiotaping  Manual Techniques:  Joint mobilization, Friction massage, Myofascial release, and Manual edema mobilization  Orthotic Fabrication:  Static, Hand based, and Forearm based  Self Care:  Self Care Tasks and Ergonomic Considerations    Long Term Goals   OT Goal 1  Goal Identifier: Opening a jar  Goal Description: Pt will report no difficulty with gripping and twisting required to open a jar with using compensatory recommendations, including adaptive equipment and approaches.  Goal Progress: Moderates difficulty  Target Date: 08/27/24      Frequency of Treatment: 1x weekly  Duration of Treatment: 6 weeks     Education Assessment: Learner/Method: Patient;Demonstration;Pictures/Video  Education Comments: PTRX via printout     Risks and benefits of evaluation/treatment have been explained.   Patient/Family/caregiver agrees with Plan of Care.     Evaluation Time:    OT Eval, Low Complexity Minutes (29250): 30  Signing Clinician: IZAIAH Trejo [facility]                                                                                   OUTPATIENT OCCUPATIONAL THERAPY      PLAN OF TREATMENT FOR OUTPATIENT REHABILITATION   Patient's Last Name, First Name, Grey Nair YOB: 1955   Provider's Name   [facility]   Medical Record No.  [id number]      Onset Date: 06/11/24 (DO Order Date) Start of Care Date: 07/16/24     Medical Diagnosis:  B CMC OA      OT Treatment Diagnosis:  B wrist pain Plan of Treatment  Frequency/Duration:1x weekly/6 weeks    Certification date from 07/16/24   To 08/27/24        See note for plan of treatment details and functional goals     Christine Ross OT                         I CERTIFY THE NEED FOR THESE SERVICES FURNISHED UNDER        THIS PLAN OF TREATMENT AND WHILE UNDER MY CARE     (Physician attestation of this document indicates review and certification of the therapy plan).              Referring Provider:  Randolph Zuleta    Initial Assessment  See Epic Evaluation- 07/16/24

## 2024-09-06 NOTE — PROGRESS NOTES
DISCHARGE  Reason for Discharge: Patient has failed to schedule further appointments.    Discharge Plan: Patient to continue home program.    Referring Provider:  Randolph Zuleta

## 2024-09-20 DIAGNOSIS — E78.5 HYPERLIPIDEMIA LDL GOAL <100: ICD-10-CM

## 2024-09-20 DIAGNOSIS — I25.10 ATHEROSCLEROSIS OF NATIVE CORONARY ARTERY OF NATIVE HEART WITHOUT ANGINA PECTORIS: ICD-10-CM

## 2024-09-20 RX ORDER — ATORVASTATIN CALCIUM 80 MG/1
TABLET, FILM COATED ORAL
Qty: 90 TABLET | Refills: 0 | Status: SHIPPED | OUTPATIENT
Start: 2024-09-20

## 2024-09-26 DIAGNOSIS — F41.9 ANXIETY: ICD-10-CM

## 2024-10-10 ENCOUNTER — VIRTUAL VISIT (OUTPATIENT)
Dept: FAMILY MEDICINE | Facility: CLINIC | Age: 69
End: 2024-10-10
Payer: COMMERCIAL

## 2024-10-10 DIAGNOSIS — R22.31 MASS OF RIGHT UPPER EXTREMITY: Primary | ICD-10-CM

## 2024-10-10 PROCEDURE — 99442 PR PHYSICIAN TELEPHONE EVALUATION 11-20 MIN: CPT | Mod: 93 | Performed by: FAMILY MEDICINE

## 2024-10-10 RX ORDER — FLUOXETINE 20 MG/1
20 TABLET, FILM COATED ORAL DAILY
Qty: 90 TABLET | Refills: 0 | Status: SHIPPED | OUTPATIENT
Start: 2024-10-10

## 2024-10-10 NOTE — PROGRESS NOTES
"Grey is a 68 year old who is being evaluated via a billable telephone visit.      If patient has telephone visit, have they been educated on video visit as preferred visit method and offered to change to video visit? yes        Instructions Relayed to Patient by Virtual Roomer:     Patient is active on [employer]:   Relayed following to patient: \"It looks like you are active on [employer], are you able to join the visit this way? If not, do you need us to send you a link now or would you like your provider to send a link via text or email when they are ready to initiate the visit?\"      Patient Confirmed they will join visit via: Provider to call patient for telephone visit   Reminded patient to ensure they were logged on to virtual visit by arrival time listed.   Asked if patient has flexibility to initiate visit sooner than arrival time: patient stated yes, documented in appointment notes availability to initiate visit earlier than arrival time     If pediatric virtual visit, ensured pediatric patient along with parent/guardian will be present for video visit.     Patient offered the website [email or web address] and/or phone number to [employer] Help line: [phone] What phone number would you like to be contacted at? [phone]  How would you like to obtain your AVS? [employer]  Originating Location (pt. Location): Home    Distant Location (provider location):  On-site    Assessment & Plan     (R22.31) Mass of right upper extremity  (primary encounter diagnosis)  Comment: Probably stable, but for some reason it has become more bothersome to the patient, and he is interested in having it removed  Plan: Adult Gen Surg  Referral                Subjective   Grey is a 68 year old, presenting for the following health issues:  Mass        10/10/2024     9:19 AM   Additional Questions   Roomed by Sherif Todd    History of Present Illness       Reason for visit:  Lump on right arm    He eats 2-3 " "servings of fruits and vegetables daily.He consumes 1 sweetened beverage(s) daily.He exercises with enough effort to increase his heart rate 60 or more minutes per day.  He exercises with enough effort to increase his heart rate 6 days per week.   He is taking medications regularly.       Concern - Possible Calcium Mass on Arm   Onset: \"Years\"  Description: mass on right arm   Intensity: moderate  Progression of Symptoms:  worsening  Accompanying Signs & Symptoms: n/a  Previous history of similar problem: yes  Precipitating factors:        Worsened by: n/a  Alleviating factors:        Improved by: n/a  Therapies tried and outcome: None             Objective           Vitals:  No vitals were obtained today due to virtual visit.    Physical Exam   General: Alert and no distress //Respiratory: No audible wheeze, cough, or shortness of breath // Psychiatric:  Appropriate affect, tone, and pace of words            Phone call duration: 12 minutes  Signed Electronically by: Navin Euceda MD    "

## 2024-10-16 ENCOUNTER — OFFICE VISIT (OUTPATIENT)
Dept: SURGERY | Facility: CLINIC | Age: 69
End: 2024-10-16
Attending: FAMILY MEDICINE
Payer: COMMERCIAL

## 2024-10-16 VITALS
SYSTOLIC BLOOD PRESSURE: 153 MMHG | DIASTOLIC BLOOD PRESSURE: 89 MMHG | HEART RATE: 61 BPM | RESPIRATION RATE: 18 BRPM | OXYGEN SATURATION: 96 %

## 2024-10-16 DIAGNOSIS — R22.31 MASS OF RIGHT UPPER EXTREMITY: ICD-10-CM

## 2024-10-16 PROCEDURE — 99203 OFFICE O/P NEW LOW 30 MIN: CPT | Performed by: SURGERY

## 2024-10-16 NOTE — LETTER
10/16/2024      Grey Markham  [address]  [place] [address]      Dear Colleague,    Thank you for referring your patient, Grey Markham, to the [facility]. Please see a copy of my visit note below.    Patient seen in consultation for right arm and left groin lumps by Navin Euceda    HPI:  Patient is a 68 year old male  with complaints of some lumps right arm, also something in right groin  Maybe 30 years ago felt these in the groin and would squeeze them. Had this thing grow and has been there. IN the last year has noticed something similar right upper arm. Can feel some tiny ones left arm also  nothing makes the episode better.  Had some removed from the right groin about 1997, recalls going to an OR but unknown details of pathology  Patient has family history of similar problems in brother    Review Of Systems    Skin: as above  Ears/Nose/Throat: negative  Respiratory: No shortness of breath, dyspnea on exertion, cough, or hemoptysis  Cardiovascular: negative  Gastrointestinal: negative  Genitourinary: negative  Musculoskeletal: negative  Neurologic: negative  Hematologic/Lymphatic/Immunologic: negative  Endocrine: negative      Past Medical History:   Diagnosis Date     Coronary atherosclerosis of native coronary artery 12/30/09    10%  mid-LAD lesion, Dr. Mendoza     DDD (degenerative disc disease), lumbar     L-4-L5, L5-S1     GERD (gastroesophageal reflux disease) 11/2007    no Rose's     Glaucoma      Glaucoma      Hyperlipidemia LDL goal <100     hyper-TG, on Simv 80mg since at least 1/4/10     Oral herpes        Past Surgical History:   Procedure Laterality Date     ARTHROSCOPY KNEE RT/LT Right 02/22/2008    [facility], Dr. Noonan     C GLAUCOMA SURG,IRIDENCLEISIS  04/01/2003    LASER     C GLAUCOMA SURG,IRIDENCLEISIS  06/01/2006    laser     CARDIAC CATHERIZATION  12/30/2009    10% stenosis of mid LAD, EF 70%, nl otherwise Dr. Elliot Mendoza, [facility]      CATARACT IOL, RT/LT Right 05/26/2016     CATARACT IOL, RT/LT Left 06/2016     COLONOSCOPY WITH CO2 INSUFFLATION N/A 07/09/2018    Procedure: COLONOSCOPY WITH CO2 INSUFFLATION;  Screen for colon cancer;  Surgeon: Navin Vasquez DO;  Location: [facility]     COMBINED ESOPHAGOSCOPY, GASTROSCOPY, DUODENOSCOPY (EGD) WITH CO2 INSUFFLATION N/A 06/14/2017    Procedure: COMBINED ESOPHAGOSCOPY, GASTROSCOPY, DUODENOSCOPY (EGD) WITH CO2 INSUFFLATION;  EGD-GASTROESOPHAGEAL REFLUX DISEASE WITHOUT ESOPHAGITIS/ DARNELL;  Surgeon: Navin Vasquez DO;  Location: [facility]     ESOPHAGOSCOPY, GASTROSCOPY, DUODENOSCOPY (EGD), COMBINED N/A 06/14/2017    Procedure: COMBINED ESOPHAGOSCOPY, GASTROSCOPY, DUODENOSCOPY (EGD), BIOPSY SINGLE OR MULTIPLE;;  Surgeon: Navin Vasquez DO;  Location: [facility]     GLAUCOMA SURGERY      ahmed valve x 1 le x 2 re     GLAUCOMA SURGERY Right 06/16/2015    trabeculectomy with [facility]     HERNIA REPAIR, UMBILICAL  age 6     IR PAE  6/28/2023     LASER SURGERY OF EYE      6-7 lasers OU glaucoma     LASER SURGERY OF EYE Right 05/05/2015    [facility]     PELVIC ARTERY EMBOLIZATION Bilateral 06/28/2023    IR prostatic artery embolization for BPH     PROSTATE BIOPSY, NEEDLE, SATURATION SAMPLING  12/04/2014    Dr. Zaragoza       Social History     Socioeconomic History     Marital status: Single     Spouse name: Not on file     Number of children: 0     Years of education: 14     Highest education level: Not on file   Occupational History     Not on file   Tobacco Use     Smoking status: Never     Smokeless tobacco: Never     Tobacco comments:     Less than 100 cigars in his lifetime   Vaping Use     Vaping status: Never Used   Substance and Sexual Activity     Alcohol use: Yes     Comment: weekends     Drug use: No     Sexual activity: Yes     Partners: Female   Other Topics Concern     Parent/sibling w/ CABG, MI or angioplasty before 65F 55M? Not Asked      Service No     Blood Transfusions No     Caffeine Concern No      Occupational Exposure No     Hobby Hazards No     Sleep Concern No     Stress Concern Yes     Comment: anxiety     Weight Concern Yes     Comment: patient is concerned about weight gain     Special Diet No     Back Care Yes     Exercise Yes     Comment: regularly     Bike Helmet No     Seat Belt Yes     Self-Exams Yes     Comment: occasionally   Social History Narrative    ** Merged History Encounter **          Social Determinants of Health     Financial Resource Strain: Low Risk  (1/9/2024)    Financial Resource Strain      Within the past 12 months, have you or your family members you live with been unable to get utilities (heat, electricity) when it was really needed?: No   Food Insecurity: Low Risk  (1/9/2024)    Food Insecurity      Within the past 12 months, did you worry that your food would run out before you got money to buy more?: No      Within the past 12 months, did the food you bought just not last and you didn t have money to get more?: No   Transportation Needs: Low Risk  (1/9/2024)    Transportation Needs      Within the past 12 months, has lack of transportation kept you from medical appointments, getting your medicines, non-medical meetings or appointments, work, or from getting things that you need?: No   Physical Activity: Not on file   Stress: Not on file   Social Connections: Not on file   Interpersonal Safety: Low Risk  (1/16/2024)    Interpersonal Safety      Do you feel physically and emotionally safe where you currently live?: Yes      Within the past 12 months, have you been hit, slapped, kicked or otherwise physically hurt by someone?: No      Within the past 12 months, have you been humiliated or emotionally abused in other ways by your partner or ex-partner?: No   Housing Stability: Low Risk  (1/9/2024)    Housing Stability      Do you have housing? : Yes      Are you worried about losing your housing?: No       Current Outpatient Medications   Medication Sig Dispense Refill      aspirin 81 MG tablet Take 1 tablet by mouth daily. * 100 tablet prn     atorvastatin (LIPITOR) 80 MG tablet TAKE 1 TABLET(80 MG) BY MOUTH DAILY FOR CHOLESTEROL 90 tablet 0     FLUoxetine 20 MG tablet Take 1 tablet (20 mg) by mouth daily. for anxiety prevention. 90 tablet 0     ketoconazole (NIZORAL) 2 % external cream APPLY TOPICALLY TO SEBORRHEA OF FACE TWICE DAILY AS NEEDED 30 g 1     losartan (COZAAR) 100 MG tablet TAKE 1 TABLET(100 MG) BY MOUTH DAILY FOR BLOOD PRESSURE 90 tablet 2     oxyBUTYnin ER (DITROPAN XL) 5 MG 24 hr tablet Take 1 tablet (5 mg) by mouth daily for overactive bladder. 30 tablet 1     pantoprazole (PROTONIX) 20 MG EC tablet TAKE 1 TABLET(20 MG) BY MOUTH DAILY 30 TO 60 MINUTES BEFORE A MEAL FOR REFLUX 90 tablet 1     sildenafil (REVATIO) 20 MG tablet TAKE 1-5 TABS BY MOUTH DAILY AS NEEDED FOR ED. TAKE 1-3 HRS BE4 INTERCOURSE. MAX 1 DOSE/24HRS 30 tablet 4     sulindac (CLINORIL) 150 MG tablet Take 1 tablet (150 mg) by mouth 2 times daily (with meals) as needed for hand/wrist pain. 180 tablet 1     valACYclovir (VALTREX) 500 MG tablet TAKE 1 TABLET BY MOUTH EVERY 12 HOURS FOR 5 DAYS. START AT ONSET OF SYMPTOMS 20 tablet 0       Medications and history reviewed    Physical exam:  Vitals: BP (!) 153/89   Pulse 61   Resp 18   SpO2 96%   BMI= There is no height or weight on file to calculate BMI.    Constitutional: healthy, alert, and no distress  Head: Normocephalic. No masses, lesions, tenderness or abnormalities  : male positive for oblong wartlike skin lesion left lateral skin of penis  Musculoskeletal: extremities normal- no gross deformities noted, gait normal, and normal muscle tone  Skin: as above and approximately 1 cm lipoma lateral right upper arm.  There are 2 tiny ones on the left forearm next to each other  Psychiatric: mentation appears normal and affect normal/bright      Assessment:     ICD-10-CM    1. Mass of right upper extremity  R22.31 Adult Gen Surg  Referral         Plan: Small lipoma in the right upper arm though it is bothersome to the patient and he would desire removal.  This is small enough that we can remove in clinic under local. Will work on scheduling.   For the other lesion that is on the penile skin it has been there and unchanged for maybe 30 years but recommended he see urology if he wants that looked into. He would like to hold off on any referral for now.    Israel Silva MD      Again, thank you for allowing me to participate in the care of your patient.        Sincerely,        Israel Silva MD

## 2024-10-16 NOTE — PROGRESS NOTES
Patient seen in consultation for right arm and left groin lumps by Navin Euceda    HPI:  Patient is a 68 year old male  with complaints of some lumps right arm, also something in right groin  Maybe 30 years ago felt these in the groin and would squeeze them. Had this thing grow and has been there. IN the last year has noticed something similar right upper arm. Can feel some tiny ones left arm also  nothing makes the episode better.  Had some removed from the right groin about 1997, recalls going to an OR but unknown details of pathology  Patient has family history of similar problems in brother    Review Of Systems    Skin: as above  Ears/Nose/Throat: negative  Respiratory: No shortness of breath, dyspnea on exertion, cough, or hemoptysis  Cardiovascular: negative  Gastrointestinal: negative  Genitourinary: negative  Musculoskeletal: negative  Neurologic: negative  Hematologic/Lymphatic/Immunologic: negative  Endocrine: negative      Past Medical History:   Diagnosis Date    Coronary atherosclerosis of native coronary artery 12/30/09    10%  mid-LAD lesion, Dr. Mendzoa    DDD (degenerative disc disease), lumbar     L-4-L5, L5-S1    GERD (gastroesophageal reflux disease) 11/2007    no Rose's    Glaucoma     Glaucoma     Hyperlipidemia LDL goal <100     hyper-TG, on Simv 80mg since at least 1/4/10    Oral herpes        Past Surgical History:   Procedure Laterality Date    ARTHROSCOPY KNEE RT/LT Right 02/22/2008    [facility], Dr. Shaista HADDAD GLAUCOMA SURG,IRIDENCLEISIS  04/01/2003    LASER    C GLAUCOMA SURG,IRIDENCLEISIS  06/01/2006    laser    CARDIAC CATHERIZATION  12/30/2009    10% stenosis of mid LAD, EF 70%, nl otherwise Dr. Elliot Mendoza, [facility]    CATARACT IOL, RT/LT Right 05/26/2016    CATARACT IOL, RT/LT Left 06/2016    COLONOSCOPY WITH CO2 INSUFFLATION N/A 07/09/2018    Procedure: COLONOSCOPY WITH CO2 INSUFFLATION;  Screen for colon cancer;  Surgeon: Navin Vasquez DO;  Location:  OR    COMBINED  ESOPHAGOSCOPY, GASTROSCOPY, DUODENOSCOPY (EGD) WITH CO2 INSUFFLATION N/A 06/14/2017    Procedure: COMBINED ESOPHAGOSCOPY, GASTROSCOPY, DUODENOSCOPY (EGD) WITH CO2 INSUFFLATION;  EGD-GASTROESOPHAGEAL REFLUX DISEASE WITHOUT ESOPHAGITIS/ DARNELL;  Surgeon: Navin Vasquez DO;  Location: [facility] OR    ESOPHAGOSCOPY, GASTROSCOPY, DUODENOSCOPY (EGD), COMBINED N/A 06/14/2017    Procedure: COMBINED ESOPHAGOSCOPY, GASTROSCOPY, DUODENOSCOPY (EGD), BIOPSY SINGLE OR MULTIPLE;;  Surgeon: Navin Vasquez DO;  Location: [facility] OR    GLAUCOMA SURGERY      ahmed valve x 1 le x 2 re    GLAUCOMA SURGERY Right 06/16/2015    trabeculectomy with [facility]    HERNIA REPAIR, UMBILICAL  age 6    IR PAE  6/28/2023    LASER SURGERY OF EYE      6-7 lasers [facility] glaucoma    LASER SURGERY OF EYE Right 05/05/2015    [facility]    PELVIC ARTERY EMBOLIZATION Bilateral 06/28/2023    IR prostatic artery embolization for BPH    PROSTATE BIOPSY, NEEDLE, SATURATION SAMPLING  12/04/2014    Dr. Zaragoza       Social History     Socioeconomic History    Marital status: Single     Spouse name: Not on file    Number of children: 0    Years of education: 14    Highest education level: Not on file   Occupational History    Not on file   Tobacco Use    Smoking status: Never    Smokeless tobacco: Never    Tobacco comments:     Less than 100 cigars in his lifetime   Vaping Use    Vaping status: Never Used   Substance and Sexual Activity    Alcohol use: Yes     Comment: weekends    Drug use: No    Sexual activity: Yes     Partners: Female   Other Topics Concern    Parent/sibling w/ CABG, MI or angioplasty before 65F 55M? Not Asked     Service No    Blood Transfusions No    Caffeine Concern No    Occupational Exposure No    Hobby Hazards No    Sleep Concern No    Stress Concern Yes     Comment: anxiety    Weight Concern Yes     Comment: patient is concerned about weight gain    Special Diet No    Back Care Yes    Exercise Yes     Comment: regularly    Bike Helmet No    Seat Belt  Yes    Self-Exams Yes     Comment: occasionally   Social History Narrative    ** Merged History Encounter **          Social Determinants of Health     Financial Resource Strain: Low Risk  (1/9/2024)    Financial Resource Strain     Within the past 12 months, have you or your family members you live with been unable to get utilities (heat, electricity) when it was really needed?: No   Food Insecurity: Low Risk  (1/9/2024)    Food Insecurity     Within the past 12 months, did you worry that your food would run out before you got money to buy more?: No     Within the past 12 months, did the food you bought just not last and you didn t have money to get more?: No   Transportation Needs: Low Risk  (1/9/2024)    Transportation Needs     Within the past 12 months, has lack of transportation kept you from medical appointments, getting your medicines, non-medical meetings or appointments, work, or from getting things that you need?: No   Physical Activity: Not on file   Stress: Not on file   Social Connections: Not on file   Interpersonal Safety: Low Risk  (1/16/2024)    Interpersonal Safety     Do you feel physically and emotionally safe where you currently live?: Yes     Within the past 12 months, have you been hit, slapped, kicked or otherwise physically hurt by someone?: No     Within the past 12 months, have you been humiliated or emotionally abused in other ways by your partner or ex-partner?: No   Housing Stability: Low Risk  (1/9/2024)    Housing Stability     Do you have housing? : Yes     Are you worried about losing your housing?: No       Current Outpatient Medications   Medication Sig Dispense Refill    aspirin 81 MG tablet Take 1 tablet by mouth daily. * 100 tablet prn    atorvastatin (LIPITOR) 80 MG tablet TAKE 1 TABLET(80 MG) BY MOUTH DAILY FOR CHOLESTEROL 90 tablet 0    FLUoxetine 20 MG tablet Take 1 tablet (20 mg) by mouth daily. for anxiety prevention. 90 tablet 0    ketoconazole (NIZORAL) 2 % external  cream APPLY TOPICALLY TO SEBORRHEA OF FACE TWICE DAILY AS NEEDED 30 g 1    losartan (COZAAR) 100 MG tablet TAKE 1 TABLET(100 MG) BY MOUTH DAILY FOR BLOOD PRESSURE 90 tablet 2    oxyBUTYnin ER (DITROPAN XL) 5 MG 24 hr tablet Take 1 tablet (5 mg) by mouth daily for overactive bladder. 30 tablet 1    pantoprazole (PROTONIX) 20 MG EC tablet TAKE 1 TABLET(20 MG) BY MOUTH DAILY 30 TO 60 MINUTES BEFORE A MEAL FOR REFLUX 90 tablet 1    sildenafil (REVATIO) 20 MG tablet TAKE 1-5 TABS BY MOUTH DAILY AS NEEDED FOR ED. TAKE 1-3 HRS BE4 INTERCOURSE. MAX 1 DOSE/24HRS 30 tablet 4    sulindac (CLINORIL) 150 MG tablet Take 1 tablet (150 mg) by mouth 2 times daily (with meals) as needed for hand/wrist pain. 180 tablet 1    valACYclovir (VALTREX) 500 MG tablet TAKE 1 TABLET BY MOUTH EVERY 12 HOURS FOR 5 DAYS. START AT ONSET OF SYMPTOMS 20 tablet 0       Medications and history reviewed    Physical exam:  Vitals: BP (!) 153/89   Pulse 61   Resp 18   SpO2 96%   BMI= There is no height or weight on file to calculate BMI.    Constitutional: healthy, alert, and no distress  Head: Normocephalic. No masses, lesions, tenderness or abnormalities  : male positive for oblong wartlike skin lesion left lateral skin of penis  Musculoskeletal: extremities normal- no gross deformities noted, gait normal, and normal muscle tone  Skin: as above and approximately 1 cm lipoma lateral right upper arm.  There are 2 tiny ones on the left forearm next to each other  Psychiatric: mentation appears normal and affect normal/bright      Assessment:     ICD-10-CM    1. Mass of right upper extremity  R22.31 Adult Gen Surg  Referral        Plan: Small lipoma in the right upper arm though it is bothersome to the patient and he would desire removal.  This is small enough that we can remove in clinic under local. Will work on scheduling.   For the other lesion that is on the penile skin it has been there and unchanged for maybe 30 years but recommended  he see urology if he wants that looked into. He would like to hold off on any referral for now.    Israel Silva MD

## 2024-12-11 ENCOUNTER — OFFICE VISIT (OUTPATIENT)
Dept: SURGERY | Facility: CLINIC | Age: 69
End: 2024-12-11
Payer: COMMERCIAL

## 2024-12-11 DIAGNOSIS — D17.21 LIPOMA OF RIGHT UPPER EXTREMITY: Primary | ICD-10-CM

## 2024-12-11 NOTE — PROGRESS NOTES
PROCEDURE:   Written consent was obtained  The right upper arm area was prepped and appropriately anesthetized with 1% lidocaine with epinephrine.  I made a 1.5 cm incision along the long axis of the arm with #15 blade.  I then used iris scissors to free the small lipoma and deliver through the wound. The total area excised, including lesion and margins was 1.2 cm.  Closure was accomplished with interrupted 3-0 vicryl for the deep subcutaneous layer and running subcuticular 4-0 vicryl for the skin.  Incision was covered with dermabond..  The procedure was well tolerated and without complications. Specimen was sent to Pathology.    Israel Silva MD

## 2024-12-11 NOTE — PATIENT INSTRUCTIONS
Use tylenol or ibuprofen for pain  The glue over the incision (if used) will fall off on its own and may take a few weeks  You may shower/bathe normally however do not submerge the incision for at least 3 days  Call the clinic at [phone] if you have ongoing bleeding, drainage or other wound concerns and we can make an appointment to have it looked at  Any pathology results generally take a few days through MyChart or a phone call

## 2024-12-11 NOTE — LETTER
12/11/2024      Grey Markham  [address]  [place] [address]      Dear Colleague,    Thank you for referring your patient, Grey Markham, to the [facility]. Please see a copy of my visit note below.    PROCEDURE:   Written consent was obtained  The right upper arm area was prepped and appropriately anesthetized with 1% lidocaine with epinephrine.  I made a 1.5 cm incision along the long axis of the arm with #15 blade.  I then used iris scissors to free the small lipoma and deliver through the wound. The total area excised, including lesion and margins was 1.2 cm.  Closure was accomplished with interrupted 3-0 vicryl for the deep subcutaneous layer and running subcuticular 4-0 vicryl for the skin.  Incision was covered with dermabond..  The procedure was well tolerated and without complications. Specimen was sent to Pathology.    Israel Silva MD        Again, thank you for allowing me to participate in the care of your patient.        Sincerely,        Israel Silva MD

## 2024-12-16 LAB
PATH REPORT.COMMENTS IMP SPEC: NORMAL
PATH REPORT.COMMENTS IMP SPEC: NORMAL
PATH REPORT.FINAL DX SPEC: NORMAL
PATH REPORT.GROSS SPEC: NORMAL
PATH REPORT.MICROSCOPIC SPEC OTHER STN: NORMAL
PATH REPORT.RELEVANT HX SPEC: NORMAL
PHOTO IMAGE: NORMAL

## 2024-12-22 DIAGNOSIS — I25.10 ATHEROSCLEROSIS OF NATIVE CORONARY ARTERY OF NATIVE HEART WITHOUT ANGINA PECTORIS: ICD-10-CM

## 2024-12-22 DIAGNOSIS — E78.5 HYPERLIPIDEMIA LDL GOAL <100: ICD-10-CM

## 2024-12-23 RX ORDER — ATORVASTATIN CALCIUM 80 MG/1
TABLET, FILM COATED ORAL
Qty: 90 TABLET | Refills: 0 | Status: SHIPPED | OUTPATIENT
Start: 2024-12-23

## 2025-01-13 ENCOUNTER — MYC MEDICAL ADVICE (OUTPATIENT)
Dept: FAMILY MEDICINE | Facility: CLINIC | Age: 70
End: 2025-01-13
Payer: COMMERCIAL

## 2025-01-13 DIAGNOSIS — I25.10 ATHEROSCLEROSIS OF NATIVE CORONARY ARTERY OF NATIVE HEART WITHOUT ANGINA PECTORIS: Primary | ICD-10-CM

## 2025-01-13 DIAGNOSIS — R97.20 ELEVATED PROSTATE SPECIFIC ANTIGEN (PSA): ICD-10-CM

## 2025-01-13 DIAGNOSIS — Z12.5 PROSTATE CANCER SCREENING: ICD-10-CM

## 2025-01-13 DIAGNOSIS — I10 ESSENTIAL HYPERTENSION WITH GOAL BLOOD PRESSURE LESS THAN 140/90: ICD-10-CM

## 2025-01-13 DIAGNOSIS — E78.5 HYPERLIPIDEMIA LDL GOAL <100: ICD-10-CM

## 2025-01-13 DIAGNOSIS — N40.1 BENIGN PROSTATIC HYPERPLASIA WITH LOWER URINARY TRACT SYMPTOMS, SYMPTOM DETAILS UNSPECIFIED: ICD-10-CM

## 2025-01-13 DIAGNOSIS — K21.9 GASTROESOPHAGEAL REFLUX DISEASE WITHOUT ESOPHAGITIS: ICD-10-CM

## 2025-01-14 RX ORDER — PANTOPRAZOLE SODIUM 20 MG/1
20 TABLET, DELAYED RELEASE ORAL DAILY
Qty: 90 TABLET | Refills: 1 | Status: SHIPPED | OUTPATIENT
Start: 2025-01-14

## 2025-01-14 NOTE — TELEPHONE ENCOUNTER
Patient out of medication, requesting ASAP. Routing as high priority to refill team.    See also refill 1/8/25 for fluoxetine refill request, will route this as high priority to provider to advise on as well      Hannah Baig, RN, BSN  [facility]  [place], [place] and [place]

## 2025-01-23 SDOH — HEALTH STABILITY: PHYSICAL HEALTH: ON AVERAGE, HOW MANY MINUTES DO YOU ENGAGE IN EXERCISE AT THIS LEVEL?: 60 MIN

## 2025-01-23 SDOH — HEALTH STABILITY: PHYSICAL HEALTH: ON AVERAGE, HOW MANY DAYS PER WEEK DO YOU ENGAGE IN MODERATE TO STRENUOUS EXERCISE (LIKE A BRISK WALK)?: 4 DAYS

## 2025-01-23 ASSESSMENT — SOCIAL DETERMINANTS OF HEALTH (SDOH): HOW OFTEN DO YOU GET TOGETHER WITH FRIENDS OR RELATIVES?: PATIENT DECLINED

## 2025-01-28 ENCOUNTER — OFFICE VISIT (OUTPATIENT)
Dept: FAMILY MEDICINE | Facility: CLINIC | Age: 70
End: 2025-01-28
Attending: FAMILY MEDICINE
Payer: COMMERCIAL

## 2025-01-28 VITALS
BODY MASS INDEX: 31.04 KG/M2 | WEIGHT: 216.8 LBS | OXYGEN SATURATION: 98 % | HEIGHT: 70 IN | HEART RATE: 69 BPM | TEMPERATURE: 96.8 F | DIASTOLIC BLOOD PRESSURE: 85 MMHG | SYSTOLIC BLOOD PRESSURE: 128 MMHG | RESPIRATION RATE: 14 BRPM

## 2025-01-28 DIAGNOSIS — E78.5 HYPERLIPIDEMIA LDL GOAL <100: ICD-10-CM

## 2025-01-28 DIAGNOSIS — I25.10 ATHEROSCLEROSIS OF NATIVE CORONARY ARTERY OF NATIVE HEART WITHOUT ANGINA PECTORIS: ICD-10-CM

## 2025-01-28 DIAGNOSIS — B00.2 HERPETIC GINGIVOSTOMATITIS: ICD-10-CM

## 2025-01-28 DIAGNOSIS — Z00.00 ENCOUNTER FOR MEDICARE ANNUAL WELLNESS EXAM: Primary | ICD-10-CM

## 2025-01-28 DIAGNOSIS — I10 ESSENTIAL HYPERTENSION WITH GOAL BLOOD PRESSURE LESS THAN 140/90: ICD-10-CM

## 2025-01-28 DIAGNOSIS — Z12.5 PROSTATE CANCER SCREENING: ICD-10-CM

## 2025-01-28 LAB
ALBUMIN SERPL BCG-MCNC: 4 G/DL (ref 3.5–5.2)
ALP SERPL-CCNC: 85 U/L (ref 40–150)
ALT SERPL W P-5'-P-CCNC: 46 U/L (ref 0–70)
ANION GAP SERPL CALCULATED.3IONS-SCNC: 12 MMOL/L (ref 7–15)
AST SERPL W P-5'-P-CCNC: 33 U/L (ref 0–45)
BILIRUB SERPL-MCNC: 0.4 MG/DL
BUN SERPL-MCNC: 13.7 MG/DL (ref 8–23)
CALCIUM SERPL-MCNC: 9 MG/DL (ref 8.8–10.4)
CHLORIDE SERPL-SCNC: 108 MMOL/L (ref 98–107)
CHOLEST SERPL-MCNC: 163 MG/DL
CREAT SERPL-MCNC: 0.97 MG/DL (ref 0.67–1.17)
EGFRCR SERPLBLD CKD-EPI 2021: 85 ML/MIN/1.73M2
ERYTHROCYTE [DISTWIDTH] IN BLOOD BY AUTOMATED COUNT: 12.3 % (ref 10–15)
FASTING STATUS PATIENT QL REPORTED: YES
FASTING STATUS PATIENT QL REPORTED: YES
GLUCOSE SERPL-MCNC: 114 MG/DL (ref 70–99)
HCO3 SERPL-SCNC: 22 MMOL/L (ref 22–29)
HCT VFR BLD AUTO: 40.9 % (ref 40–53)
HDLC SERPL-MCNC: 59 MG/DL
HGB BLD-MCNC: 14 G/DL (ref 13.3–17.7)
LDLC SERPL CALC-MCNC: 65 MG/DL
MCH RBC QN AUTO: 29.7 PG (ref 26.5–33)
MCHC RBC AUTO-ENTMCNC: 34.2 G/DL (ref 31.5–36.5)
MCV RBC AUTO: 87 FL (ref 78–100)
NONHDLC SERPL-MCNC: 104 MG/DL
PLATELET # BLD AUTO: 211 10E3/UL (ref 150–450)
POTASSIUM SERPL-SCNC: 4.5 MMOL/L (ref 3.4–5.3)
PROT SERPL-MCNC: 6.7 G/DL (ref 6.4–8.3)
PSA SERPL DL<=0.01 NG/ML-MCNC: 6.84 NG/ML (ref 0–4.5)
RBC # BLD AUTO: 4.72 10E6/UL (ref 4.4–5.9)
SODIUM SERPL-SCNC: 142 MMOL/L (ref 135–145)
TRIGL SERPL-MCNC: 197 MG/DL
WBC # BLD AUTO: 5.5 10E3/UL (ref 4–11)

## 2025-01-28 PROCEDURE — 85027 COMPLETE CBC AUTOMATED: CPT | Performed by: FAMILY MEDICINE

## 2025-01-28 PROCEDURE — 80053 COMPREHEN METABOLIC PANEL: CPT | Performed by: FAMILY MEDICINE

## 2025-01-28 PROCEDURE — G0103 PSA SCREENING: HCPCS | Performed by: FAMILY MEDICINE

## 2025-01-28 PROCEDURE — 36415 COLL VENOUS BLD VENIPUNCTURE: CPT | Performed by: FAMILY MEDICINE

## 2025-01-28 PROCEDURE — G0439 PPPS, SUBSEQ VISIT: HCPCS | Performed by: FAMILY MEDICINE

## 2025-01-28 PROCEDURE — G2211 COMPLEX E/M VISIT ADD ON: HCPCS | Performed by: FAMILY MEDICINE

## 2025-01-28 PROCEDURE — 80061 LIPID PANEL: CPT | Performed by: FAMILY MEDICINE

## 2025-01-28 PROCEDURE — 99214 OFFICE O/P EST MOD 30 MIN: CPT | Mod: 25 | Performed by: FAMILY MEDICINE

## 2025-01-28 RX ORDER — ATORVASTATIN CALCIUM 80 MG/1
80 TABLET, FILM COATED ORAL DAILY
Qty: 90 TABLET | Refills: 3 | Status: SHIPPED | OUTPATIENT
Start: 2025-01-28

## 2025-01-28 RX ORDER — VALACYCLOVIR HYDROCHLORIDE 500 MG/1
TABLET, FILM COATED ORAL
Qty: 20 TABLET | Refills: 0 | Status: SHIPPED | OUTPATIENT
Start: 2025-01-28

## 2025-01-28 RX ORDER — LOSARTAN POTASSIUM 100 MG/1
TABLET ORAL
Qty: 90 TABLET | Refills: 3 | Status: SHIPPED | OUTPATIENT
Start: 2025-01-28

## 2025-01-28 ASSESSMENT — PAIN SCALES - GENERAL: PAINLEVEL_OUTOF10: NO PAIN (0)

## 2025-01-28 NOTE — PATIENT INSTRUCTIONS
Patient Education   Preventive Care Advice   This is general advice given by our system to help you stay healthy. However, your care team may have specific advice just for you. Please talk to your care team about your preventive care needs.  Nutrition  Eat 5 or more servings of fruits and vegetables each day.  Try wheat bread, brown rice and whole grain pasta (instead of white bread, rice, and pasta).  Get enough calcium and vitamin D. Check the label on foods and aim for 100% of the RDA (recommended daily allowance).  Lifestyle  Exercise at least 150 minutes each week  (30 minutes a day, 5 days a week).  Do muscle strengthening activities 2 days a week. These help control your weight and prevent disease.  No smoking.  Wear sunscreen to prevent skin cancer.  Have a dental exam and cleaning every 6 months.  Yearly exams  See your health care team every year to talk about:  Any changes in your health.  Any medicines your care team has prescribed.  Preventive care, family planning, and ways to prevent chronic diseases.  Shots (vaccines)   HPV shots (up to age 26), if you've never had them before.  Hepatitis B shots (up to age 59), if you've never had them before.  COVID-19 shot: Get this shot when it's due.  Flu shot: Get a flu shot every year.  Tetanus shot: Get a tetanus shot every 10 years.  Pneumococcal, hepatitis A, and RSV shots: Ask your care team if you need these based on your risk.  Shingles shot (for age 50 and up)  General health tests  Diabetes screening:  Starting at age 35, Get screened for diabetes at least every 3 years.  If you are younger than age 35, ask your care team if you should be screened for diabetes.  Cholesterol test: At age 39, start having a cholesterol test every 5 years, or more often if advised.  Bone density scan (DEXA): At age 50, ask your care team if you should have this scan for osteoporosis (brittle bones).  Hepatitis C: Get tested at least once in your life.  STIs (sexually  transmitted infections)  Before age 24: Ask your care team if you should be screened for STIs.  After age 24: Get screened for STIs if you're at risk. You are at risk for STIs (including HIV) if:  You are sexually active with more than one person.  You don't use condoms every time.  You or a partner was diagnosed with a sexually transmitted infection.  If you are at risk for HIV, ask about PrEP medicine to prevent HIV.  Get tested for HIV at least once in your life, whether you are at risk for HIV or not.  Cancer screening tests  Cervical cancer screening: If you have a cervix, begin getting regular cervical cancer screening tests starting at age 21.  Breast cancer scan (mammogram): If you've ever had breasts, begin having regular mammograms starting at age 40. This is a scan to check for breast cancer.  Colon cancer screening: It is important to start screening for colon cancer at age 45.  Have a colonoscopy test every 10 years (or more often if you're at risk) Or, ask your provider about stool tests like a FIT test every year or Cologuard test every 3 years.  To learn more about your testing options, visit:   .  For help making a decision, visit:   https://bit.ly/yp47538.  Prostate cancer screening test: If you have a prostate, ask your care team if a prostate cancer screening test (PSA) at age 55 is right for you.  Lung cancer screening: If you are a current or former smoker ages 50 to 80, ask your care team if ongoing lung cancer screenings are right for you.  For informational purposes only. Not to replace the advice of your health care provider. Copyright   2023 [facility] [employer]. All rights reserved. Clinically reviewed by the [facility]. [employer] [id number] - REV 01/24.  Hearing Loss: Care Instructions  Overview     Hearing loss is a sudden or slow decrease in how well you hear. It can range from slight to profound. Permanent hearing loss can occur with aging. It also can  happen when you are exposed long-term to loud noise. Examples include listening to loud music, riding motorcycles, or being around other loud machines.  Hearing loss can affect your work and home life. It can make you feel lonely or depressed. You may feel that you have lost your independence. But hearing aids and other devices can help you hear better and feel connected to others.  Follow-up care is a key part of your treatment and safety. Be sure to make and go to all appointments, and call your doctor if you are having problems. It's also a good idea to know your test results and keep a list of the medicines you take.  How can you care for yourself at home?  Avoid loud noises whenever possible. This helps keep your hearing from getting worse.  Always wear hearing protection around loud noises.  Wear a hearing aid as directed.  A professional can help you pick a hearing aid that will work best for you.  You can also get hearing aids over the counter for mild to moderate hearing loss.  Have hearing tests as your doctor suggests. They can show whether your hearing has changed. Your hearing aid may need to be adjusted.  Use other devices as needed. These may include:  Telephone amplifiers and hearing aids that can connect to a television, stereo, radio, or microphone.  Devices that use lights or vibrations. These alert you to the doorbell, a ringing telephone, or a baby monitor.  Television closed-captioning. This shows the words at the bottom of the screen. Most new TVs can do this.  TTY (text telephone). This lets you type messages back and forth on the telephone instead of talking or listening. These devices are also called TDD. When messages are typed on the keyboard, they are sent over the phone line to a receiving TTY. The message is shown on a monitor.  Use text messaging, social media, and email if it is hard for you to communicate by telephone.  Try to learn a listening technique called speechreading. It is  "not lipreading. You pay attention to people's gestures, expressions, posture, and tone of voice. These clues can help you understand what a person is saying. Face the person you are talking to, and have them face you. Make sure the lighting is good. You need to see the other person's face clearly.  Think about counseling if you need help to adjust to your hearing loss.  When should you call for help?  Watch closely for changes in your health, and be sure to contact your doctor if:    You think your hearing is getting worse.     You have new symptoms, such as dizziness or nausea.   Where can you learn more?  Go to https://www."[employer]net/patiented  Enter R798 in the search box to learn more about \"Hearing Loss: Care Instructions.\"  Current as of: September 27, 2023  Content Version: 14.3    2024 [employer].   Care instructions adapted under license by your healthcare professional. If you have questions about a medical condition or this instruction, always ask your healthcare professional. [employer] disclaims any warranty or liability for your use of this information.    Learning About Stress  What is stress?     Stress is your body's response to a hard situation. Your body can have a physical, emotional, or mental response. Stress is a fact of life for most people, and it affects everyone differently. What causes stress for you may not be stressful for someone else.  A lot of things can cause stress. You may feel stress when you go on a job interview, take a test, or run a race. This kind of short-term stress is normal and even useful. It can help you if you need to work hard or react quickly. For example, stress can help you finish an important job on time.  Long-term stress is caused by ongoing stressful situations or events. Examples of long-term stress include long-term health problems, ongoing problems at work, or conflicts in your family. Long-term stress can harm your health.  How " does stress affect your health?  When you are stressed, your body responds as though you are in danger. It makes hormones that speed up your heart, make you breathe faster, and give you a burst of energy. This is called the fight-or-flight stress response. If the stress is over quickly, your body goes back to normal and no harm is done.  But if stress happens too often or lasts too long, it can have bad effects. Long-term stress can make you more likely to get sick, and it can make symptoms of some diseases worse. If you tense up when you are stressed, you may develop neck, shoulder, or low back pain. Stress is linked to high blood pressure and heart disease.  Stress also harms your emotional health. It can make you clark, tense, or depressed. Your relationships may suffer, and you may not do well at work or school.  What can you do to manage stress?  You can try these things to help manage stress:   Do something active. Exercise or activity can help reduce stress. Walking is a great way to get started. Even everyday activities such as housecleaning or yard work can help.  Try yoga or urbano chi. These techniques combine exercise and meditation. You may need some training at first to learn them.  Do something you enjoy. For example, listen to music or go to a movie. Practice your hobby or do volunteer work.  Meditate. This can help you relax, because you are not worrying about what happened before or what may happen in the future.  Do guided imagery. Imagine yourself in any setting that helps you feel calm. You can use online videos, books, or a teacher to guide you.  Do breathing exercises. For example:  From a standing position, bend forward from the waist with your knees slightly bent. Let your arms dangle close to the floor.  Breathe in slowly and deeply as you return to a standing position. Roll up slowly and lift your head last.  Hold your breath for just a few seconds in the standing position.  Breathe out  "slowly and bend forward from the waist.  Let your feelings out. Talk, laugh, cry, and express anger when you need to. Talking with supportive friends or family, a counselor, or a rocky leader about your feelings is a healthy way to relieve stress. Avoid discussing your feelings with people who make you feel worse.  Write. It may help to write about things that are bothering you. This helps you find out how much stress you feel and what is causing it. When you know this, you can find better ways to cope.  What can you do to prevent stress?  You might try some of these things to help prevent stress:  Manage your time. This helps you find time to do the things you want and need to do.  Get enough sleep. Your body recovers from the stresses of the day while you are sleeping.  Get support. Your family, friends, and community can make a difference in how you experience stress.  Limit your news feed. Avoid or limit time on social media or news that may make you feel stressed.  Do something active. Exercise or activity can help reduce stress. Walking is a great way to get started.  Where can you learn more?  Go to https://www.[employer].net/patiented  Enter N032 in the search box to learn more about \"Learning About Stress.\"  Current as of: October 24, 2023  Content Version: 14.3    2024 [employer].   Care instructions adapted under license by your healthcare professional. If you have questions about a medical condition or this instruction, always ask your healthcare professional. [employer] disclaims any warranty or liability for your use of this information.    9 Ways to Cut Back on Drinking  Maybe you've found yourself drinking more alcohol than you'd prefer. If you want to cut back, here are some ideas to try.    Think before you drink.  Do you really want a drink, or is it just a habit? If you're used to having a drink at a certain time, try doing something else then.     Look for substitutes.  " "Find some no-alcohol drinks that you enjoy, like flavored seltzer water, tea with honey, or tonic with a slice of lime. Or try alcohol-free beer or \"virgin\" cocktails (without the alcohol).     Drink more water.  Use water to quench your thirst. Drink a glass of water before you have any alcohol. Have another glass along with every drink or between drinks.     Shrink your drink.  For example, have a bottle of beer instead of a pint. Use a smaller glass for wine. Choose drinks with lower alcohol content (ABV%). Or use less liquor and more mixer in cocktails.     Slow down.  It's easy to drink quickly and without thinking about it. Pay attention, and make each drink last longer.     Do the math.  Total up how much you spend on alcohol each month. How much is that a year? If you cut back, what could you do with the money you save?     Take a break.  Choose a day or two each week when you won't drink at all. Notice how you feel on those days, physically and emotionally. How did you sleep? Do you feel better? Over time, add more break days.     Count calories.  Would you like to lose some weight? For some people that's a good motivator for cutting back. Figure out how many calories are in each drink. How many does that add up to in a day? In a week? In a month?     Practice saying no.  Be ready when someone offers you a drink. Try: \"Thanks, I've had enough.\" Or \"Thanks, but I'm cutting back.\" Or \"No, thanks. I feel better when I drink less.\"   Current as of: November 15, 2023  Content Version: 14.3    2024 [employer].   Care instructions adapted under license by your healthcare professional. If you have questions about a medical condition or this instruction, always ask your healthcare professional. [employer] disclaims any warranty or liability for your use of this information.     "

## 2025-01-28 NOTE — PROGRESS NOTES
"Preventive Care Visit  [facility]  Navin Euceda MD, Family Medicine  Jan 28, 2025      Assessment & Plan     (Z00.00) Encounter for Medicare annual wellness exam  (primary encounter diagnosis)  Comment: Negative screening exam; up-to-date on preventive services.   Plan: Prostate Specific Antigen Screen        Return in about 1 year (around 1/28/2026) for Medicare annual wellness exam, cholesterol, blood pressure check.  VIS for RSV vaccine provided for the patient. Vaccine discussed.     (I10) Essential hypertension with goal blood pressure less than 140/90  Comment: well controlled.  Plan: losartan (COZAAR) 100 MG tablet, Basic         metabolic panel, CBC with platelets        Return in about 1 year (around 1/28/2026) for Medicare annual wellness exam, cholesterol, blood pressure check.      (E78.5) Hyperlipidemia LDL goal <100  (I25.10) Atherosclerosis of native coronary artery of native heart without angina pectoris  Comment: historically at goal, on a high-intensity statin   Plan: atorvastatin (LIPITOR) 80 MG tablet, Lipid         panel reflex to direct LDL Non-fasting, ALT        Return in about 1 year (around 1/28/2026) for Medicare annual wellness exam, cholesterol, blood pressure check.        (B00.2) Herpetic gingivostomatitis  Comment: prescription update   Plan: valACYclovir (VALTREX) 500 MG tablet            (Z12.5) Prostate cancer screening  Comment: he already has elevated PSA levels from his BPH. This time his level may drop following his pelvic artery embolization.    Plan: Prostate Specific Antigen Screen            BMI  Estimated body mass index is 30.73 kg/m  as calculated from the following:    Height as of this encounter: 1.789 m (5' 10.43\").    Weight as of this encounter: 98.3 kg (216 lb 12.8 oz).   Weight management plan: exercise recommendations, as summarized in the AVS.     Counseling  Appropriate preventive services were addressed with this patient via " screening, questionnaire, or discussion as appropriate for fall prevention, nutrition, physical activity, Tobacco-use cessation, social engagement, weight loss and cognition.  Checklist reviewing preventive services available has been given to the patient.  Reviewed patient's diet, addressing concerns and/or questions.   He is at risk for psychosocial distress and has been provided with information to reduce risk.   The patient reports drinking more than 3 alcoholic drinks per day and/or more than 7 drhnks per week. The patient was counseled and given information about possible harmful effects of excessive alcohol intake.The patient was provided with written information regarding signs of hearing loss.     Bassam Edmonds is a 69 year old, presenting for the following:  Physical        1/28/2025     9:44 AM   Additional Questions   Roomed by Janeth PINON  Health Care Directive  Patient does not have a Health Care Directive: Discussed advance care planning with patient; information given to patient to review.      1/23/2025   General Health   How would you rate your overall physical health? Excellent   Feel stress (tense, anxious, or unable to sleep) To some extent   (!) STRESS CONCERN      1/23/2025   Nutrition   Diet: Regular (no restrictions)         1/23/2025   Exercise   Days per week of moderate/strenous exercise 4 days   Average minutes spent exercising at this level 60 min         1/23/2025   Social Factors   Frequency of gathering with friends or relatives Patient declined   Worry food won't last until get money to buy more No   Food not last or not have enough money for food? No   Do you have housing? (Housing is defined as stable permanent housing and does not include staying ouside in a car, in a tent, in an abandoned building, in an overnight shelter, or couch-surfing.) Yes   Are you worried about losing your housing? No   Lack of transportation? No   Unable to get utilities (heat,electricity)?  No         1/23/2025   Fall Risk   Fallen 2 or more times in the past year? No    No   Trouble with walking or balance? No    No       Multiple values from one day are sorted in reverse-chronological order          1/23/2025   Activities of Daily Living- Home Safety   Needs help with the following daily activites None of the above   Safety concerns in the home None of the above         1/23/2025   Dental   Dentist two times every year? Yes         1/23/2025   Hearing Screening   Hearing concerns? (!) TROUBLE UNDERSTANDING SOFT OR WHISPERED SPEECH.    (!) TROUBLE UNDERSTANDING SPEECH ON THE TELEPHONE       Multiple values from one day are sorted in reverse-chronological order         1/23/2025   Driving Risk Screening   Patient/family members have concerns about driving No         1/23/2025   General Alertness/Fatigue Screening   Have you been more tired than usual lately? No         1/23/2025   Urinary Incontinence Screening   Bothered by leaking urine in past 6 months No         1/23/2025   TB Screening   Were you born outside of the [place]? No         Today's PHQ-2 Score:       1/27/2025    12:24 PM   PHQ-2 ( 1999 Pf[employer])   Q1: Little interest or pleasure in doing things 0   Q2: Feeling down, depressed or hopeless 1   PHQ-2 Score 1    Q1: Little interest or pleasure in doing things Not at all   Q2: Feeling down, depressed or hopeless Several days   PHQ-2 Score 1       Patient-reported           1/23/2025   Substance Use   Alcohol more than 3/day or more than 7/wk Yes   How often do you have a drink containing alcohol 2 to 3 times a week   How many alcohol drinks on typical day 5 or 6   How often do you have 5+ drinks at one occasion Weekly   Audit 2/3 Score 5   How often not able to stop drinking once started Never   How often failed to do what normally expected Never   How often needed first drink in am after a heavy drinking session Never   How often feeling of guilt or remorse after drinking Never   How often  unable to remember what happened the night before Never   Have you or someone else been injured because of your drinking No   Has anyone been concerned or suggested you cut down on drinking No   TOTAL SCORE - AUDIT 8   Do you have a current opioid prescription? No   How severe/bad is pain from 1 to 10? 3/10   Do you use any other substances recreationally? No     Social History     Tobacco Use    Smoking status: Never    Smokeless tobacco: Never    Tobacco comments:     Less than 100 cigars in his lifetime   Vaping Use    Vaping status: Never Used   Substance Use Topics    Alcohol use: Yes     Comment: I only drink 2 days a week on weekends. STRICTLY 2 days only    Drug use: No           1/23/2025   AAA Screening   Family history of Abdominal Aortic Aneurysm (AAA)? No   Last PSA:   PSA   Date Value Ref Range Status   08/14/2020 14.10 (H) 0 - 4 ug/L Final     Comment:     Assay Method:  Chemiluminescence using Siemens Vista analyzer     Prostate Specific Antigen Screen   Date Value Ref Range Status   01/16/2024 8.12 (H) 0.00 - 4.50 ng/mL Final   10/24/2022 14.10 (H) 0.00 - 4.00 ug/L Final     ASCVD Risk   The 10-year ASCVD risk score (Amor SANTOS, et al., 2019) is: 14%    Values used to calculate the score:      Age: 69 years      Sex: Male      Is Non- : No      Diabetic: No      Tobacco smoker: No      Systolic Blood Pressure: 128 mmHg      Is BP treated: Yes      HDL Cholesterol: 63 mg/dL      Total Cholesterol: 151 mg/dL        Reviewed and updated as needed this visit by Provider     Meds              Current providers sharing in care for this patient include:  Patient Care Team:  Navin Euceda MD as PCP - General (Family Medicine)  Navin Euceda MD (Family Practice)  Navin Euceda MD as Assigned PCP  Audie Desouza MA as Audiologist (Audiology)  Easton iSlva MD as MD (Urology)  Israel Silva MD as MD (Surgery)  Israel Silva MD as Assigned  "Surgical Provider    The following health maintenance items are reviewed in Epic and correct as of today:  Health Maintenance   Topic Date Due    RSV VACCINE (1 - Risk 60-74 years 1-dose series) Never done    BMP  01/16/2025    ANNUAL REVIEW OF HM ORDERS  01/16/2025    LIPID  05/15/2025    MEDICARE ANNUAL WELLNESS VISIT  01/28/2026    FALL RISK ASSESSMENT  01/28/2026    GLUCOSE  01/16/2027    COLORECTAL CANCER SCREENING  07/09/2028    ADVANCE CARE PLANNING  05/15/2029    DTAP/TDAP/TD IMMUNIZATION (4 - Td or Tdap) 10/24/2032    HEPATITIS C SCREENING  Completed    PHQ-2 (once per calendar year)  Completed    INFLUENZA VACCINE  Completed    Pneumococcal Vaccine: 50+ Years  Completed    ZOSTER IMMUNIZATION  Completed    COVID-19 Vaccine  Completed    HPV IMMUNIZATION  Aged Out    MENINGITIS IMMUNIZATION  Aged Out    RSV MONOCLONAL ANTIBODY  Aged Out     Review of Systems     Objective    Exam  /85 (BP Location: Left arm, Patient Position: Sitting, Cuff Size: Adult Large)   Pulse 69   Temp 96.8  F (36  C) (Temporal)   Resp 14   Ht 1.789 m (5' 10.43\")   Wt 98.3 kg (216 lb 12.8 oz)   SpO2 98%   BMI 30.73 kg/m     Estimated body mass index is 30.73 kg/m  as calculated from the following:    Height as of this encounter: 1.789 m (5' 10.43\").    Weight as of this encounter: 98.3 kg (216 lb 12.8 oz).    Physical Exam  GENERAL: alert and no distress  EYES: Eyes grossly normal to inspection, PERRL and conjunctivae and sclerae normal  HENT: ear canals and TM's normal, nose and mouth without ulcers or lesions  NECK: no adenopathy, no asymmetry, masses, or scars  RESP: lungs clear to auscultation - no rales, rhonchi or wheezes  CV: regular rate and rhythm, normal S1 S2, no S3 or S4, no murmur, click or rub, no peripheral edema  ABDOMEN: soft, nontender, no hepatosplenomegaly, no masses and bowel sounds normal. Diastasis recti present.    (male): normal male genitalia without urethral discharge, no hernia. He has a 2x1 " cm seborrheic keratosis on the left side of the shaft of the penis (present since 1999).   MS: no gross musculoskeletal defects noted, no edema  SKIN: no suspicious lesions or rashes  NEURO: Normal strength and tone, mentation intact and speech normal  PSYCH: mentation appears normal, affect normal/bright        1/28/2025   Mini Cog   Clock Draw Score 2 Normal   3 Item Recall 3 objects recalled   Mini Cog Total Score 5          This document serves as a record of the services and decisions personally performed and made by Dr. Euceda. It was created on his behalf by Sandy Robertson, a trained medical scribe. The creation of this document is based the provider's statements to the medical scribe.  Sandy Robertson, 10:12 AM         Signed Electronically by: Navin Euceda MD

## 2025-03-04 ENCOUNTER — MYC REFILL (OUTPATIENT)
Dept: FAMILY MEDICINE | Facility: CLINIC | Age: 70
End: 2025-03-04
Payer: COMMERCIAL

## 2025-03-04 DIAGNOSIS — I10 ESSENTIAL HYPERTENSION WITH GOAL BLOOD PRESSURE LESS THAN 140/90: ICD-10-CM

## 2025-03-04 RX ORDER — LOSARTAN POTASSIUM 100 MG/1
TABLET ORAL
Qty: 90 TABLET | Refills: 3 | OUTPATIENT
Start: 2025-03-04

## 2025-03-18 DIAGNOSIS — E78.5 HYPERLIPIDEMIA LDL GOAL <100: ICD-10-CM

## 2025-03-18 DIAGNOSIS — I25.10 ATHEROSCLEROSIS OF NATIVE CORONARY ARTERY OF NATIVE HEART WITHOUT ANGINA PECTORIS: ICD-10-CM

## 2025-03-18 RX ORDER — ATORVASTATIN CALCIUM 80 MG/1
TABLET, FILM COATED ORAL
Qty: 90 TABLET | Refills: 3 | OUTPATIENT
Start: 2025-03-18

## 2025-03-27 ENCOUNTER — MYC MEDICAL ADVICE (OUTPATIENT)
Dept: FAMILY MEDICINE | Facility: CLINIC | Age: 70
End: 2025-03-27
Payer: COMMERCIAL

## 2025-03-28 NOTE — TELEPHONE ENCOUNTER
This writer attempted to contact patient on 03/28/25      Reason for call high blood pressure triage and left message.      If patient calls back:   Registered Nurse called. Follow Triage Call workflow        Alisa Luis, RN

## 2025-03-31 NOTE — TELEPHONE ENCOUNTER
Pt called and wonders if he needs to increase Losartan or to monitor for now? Pt usually takes losartan at lunch [id number]. Here are pt's current BP log:    Sat 3/29/25    0855 147/90  1020 155/97  1050 185/92  Apporx. noon 175/100 (pt also lifted weights prior)  Took losartan around 1300    1415 118/83  1510 159/87  1540 155/92  1355 168/96  1650 139/85  1730 158/97  1845 137/92    - Pt states on Saturday he did not sleep well, was awake until 0430, and had to drive over to his home town in [place] which may have increased his BP.     -------------------------------------------------------------------------------------  Sun 3/30/25    1025 149/83  1110  149/89  1335 took losartan 153/87  1355 136/87  1525 144/86  1645 146/96  1745 122/77    -------------------------------------------------------------------------------------    Mon 3/31/25    0915 133/91  1024 159/96  1040 153/88  Took losartan at noon    1252 144/887    -------------------------------------------------------------------------------------    Pt's BPs seem to be above goal in the afternoon. Pt takes readings when seated, rested, and does not cross legs.    Routing to PCP to review and advise.       Belkis Mcgowan RN on 3/31/2025 at 1:15 PM

## 2025-04-15 ENCOUNTER — OFFICE VISIT (OUTPATIENT)
Dept: FAMILY MEDICINE | Facility: CLINIC | Age: 70
End: 2025-04-15
Payer: COMMERCIAL

## 2025-04-15 VITALS
HEIGHT: 71 IN | BODY MASS INDEX: 30.52 KG/M2 | HEART RATE: 73 BPM | RESPIRATION RATE: 18 BRPM | DIASTOLIC BLOOD PRESSURE: 78 MMHG | WEIGHT: 218 LBS | TEMPERATURE: 97 F | SYSTOLIC BLOOD PRESSURE: 136 MMHG | OXYGEN SATURATION: 99 %

## 2025-04-15 DIAGNOSIS — I10 ESSENTIAL HYPERTENSION WITH GOAL BLOOD PRESSURE LESS THAN 140/90: Primary | ICD-10-CM

## 2025-04-15 DIAGNOSIS — F41.9 ANXIETY: ICD-10-CM

## 2025-04-15 DIAGNOSIS — Z29.11 NEED FOR VACCINATION AGAINST RESPIRATORY SYNCYTIAL VIRUS: ICD-10-CM

## 2025-04-15 DIAGNOSIS — Z23 NEED FOR VACCINATION: ICD-10-CM

## 2025-04-15 PROCEDURE — 3078F DIAST BP <80 MM HG: CPT | Performed by: FAMILY MEDICINE

## 2025-04-15 PROCEDURE — 90480 ADMN SARSCOV2 VAC 1/ONLY CMP: CPT | Performed by: FAMILY MEDICINE

## 2025-04-15 PROCEDURE — 91320 SARSCV2 VAC 30MCG TRS-SUC IM: CPT | Performed by: FAMILY MEDICINE

## 2025-04-15 PROCEDURE — 99214 OFFICE O/P EST MOD 30 MIN: CPT | Performed by: FAMILY MEDICINE

## 2025-04-15 PROCEDURE — 3075F SYST BP GE 130 - 139MM HG: CPT | Performed by: FAMILY MEDICINE

## 2025-04-15 PROCEDURE — 1126F AMNT PAIN NOTED NONE PRSNT: CPT | Performed by: FAMILY MEDICINE

## 2025-04-15 RX ORDER — FLUOXETINE 10 MG/1
10 CAPSULE ORAL DAILY
Qty: 90 CAPSULE | Refills: 2 | Status: SHIPPED | OUTPATIENT
Start: 2025-04-15

## 2025-04-15 RX ORDER — VALSARTAN 320 MG/1
320 TABLET ORAL DAILY
Qty: 90 TABLET | Refills: 2 | Status: SHIPPED | OUTPATIENT
Start: 2025-04-15

## 2025-04-15 ASSESSMENT — PAIN SCALES - GENERAL: PAINLEVEL_OUTOF10: NO PAIN (0)

## 2025-04-15 NOTE — PATIENT INSTRUCTIONS
At [facility], we strive to deliver an exceptional experience to you, every time we see you. If you receive a survey, please let us know what we are doing well and/or what we could improve upon, as we do value your feedback.  If you have MyChart, you can expect to receive results automatically within 24 hours of their completion.  Your provider will send a note interpreting your results as well.   If you do not have MyChart, you should receive your results in about a week by mail.    Your care team:                            Family Medicine Internal Medicine   MD Kuldeep Fernandez, MD Christelle Triana, MD Jacobo Oliveira, MD Judith Leal, PA-C    Allan Cardona, MD Pediatrics   Aleyda Kennedy, MD Griselda Staton, PERLA Harry CNP Blaire Suarez, MD Tayler Alegre, MD Debbie Mahan, CNP     Cynthia Barber, PA-C Same-Day Provider (No follow-up visits)   PERLA Barcenas, SHIRLEY Cano, PA-C    Stefani Gr PA-C     Clinic hours: Monday - Thursday 7 am-6 pm; Fridays 7 am-5 pm.   Urgent care: Monday - Friday 10 am- 8 pm; Saturday and Sunday 9 am-5 pm.    Clinic: [phone]       [facility]: Monday - Thursday 8 am - 7 pm; Friday 8 am - 6 pm  [facility]: [phone]

## 2025-04-15 NOTE — PROGRESS NOTES
Assessment & Plan     (I10) Essential hypertension with goal blood pressure less than 140/90  (primary encounter diagnosis)  Comment: The patient comes in complaining of very high readings recently, but these were in the context of a high anxiety situation and sleep deprivation. His blood pressure is normal today (stage 1 level) and his home BP monitor is accurate.  Plan: valsartan (DIOVAN) 320 MG tablet        To improve his BP control, I am changing his losartan to valsartan. Return in about 43 weeks (around 2/10/2026) for Medicare annual wellness exam, cholesterol, blood pressure check.      (Z29.11) Need for vaccination against respiratory syncytial virus  Comment:   Plan: recommended that he gets it a pharmacy.     (Z23) Need for vaccination  Comment:   Plan: COVID-19 12+ (PFIZER)            (F41.9) Anxiety  Comment: Patient notes a significant increase in anxiety lately. 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 His girlfriend suggested he tries a medication such as Xanax. He already takes an selective serotonin reuptake inhibitor for anxiety, and he denies having actual anxiety or panic attacks.  Plan: FLUoxetine (PROZAC) 10 MG capsule        Increase fluoxetine to 30 mg per day. After being on this dose for 5-6 weeks, he could follow-up with a video visit to discuss further med changes as needed. We discussed side effects associated with SSRIs.       Bassam Edmonds is a 69 year old, presenting for the following health issues:  Hypertension        4/15/2025     1:56 PM   Additional Questions   Roomed by liam   Accompanied by self         4/15/2025     1:56 PM   Patient Reported Additional Medications   Patient reports taking the following new medications none     History of Present Illness       Hypertension: He presents for follow up of hypertension.  He does  "check blood pressure  regularly outside of the clinic. Outside blood pressures have been over 140/90. He does not follow a low salt diet.     He eats 2-3 servings of fruits and vegetables daily.He consumes 1 sweetened beverage(s) daily.He exercises with enough effort to increase his heart rate 60 or more minutes per day.  He exercises with enough effort to increase his heart rate 4 days per week.   He is taking medications regularly.        Hypertension Follow-up    Do you check your blood pressure regularly outside of the clinic? Yes   Are you following a low salt diet? No  Are your blood pressures ever more than 140 on the top number (systolic) OR more   than 90 on the bottom number (diastolic), for example 140/90? Yes    BP Readings from Last 2 Encounters:   04/15/25 136/78   01/28/25 128/85     How many servings of fruits and vegetables do you eat daily?  0-1  On average, how many sweetened beverages do you drink each day (Examples: soda, juice, sweet tea, etc.  Do NOT count diet or artificially sweetened beverages)?   1  How many days per week do you exercise enough to make your heart beat faster? 3 or less  How many minutes a day do you exercise enough to make your heart beat faster? 60 or more  How many days per week do you miss taking your medication? 0      He stayed up until 4:30 am on a Saturday night. The next day he had a long car ride and a stressful visit. He was getting home SBP readings above 200 (2 weeks ago and the high measures lasted about 1 week). His readings for the past week have been averaging around 150s.     146/86, 137/79 at home BP monitor compared to 136/78 here in the exam room.    Review of Systems        Objective    /78 (BP Location: Left arm, Patient Position: Sitting, Cuff Size: Adult Regular)   Pulse 73   Temp 97  F (36.1  C) (Temporal)   Resp 18   Ht 1.791 m (5' 10.5\")   Wt 98.9 kg (218 lb)   SpO2 99%   BMI 30.84 kg/m    Body mass index is 30.84 kg/m .    Physical " Exam   GENERAL: healthy, alert and no distress  EYES: Eyes grossly normal to inspection, PERRL, EOMI, sclerae white and conjunctivae normal  MS: no gross musculoskeletal defects noted, no edema  SKIN: no suspicious lesions or rashes to visible skin  NEURO: Normal strength and tone, sensory exam grossly normal, mentation intact, oriented times 3 and cranial nerves 2-12 intact  PSYCH: mentation appears normal, affect normal/bright       This document serves as a record of the services and decisions personally performed and made by Dr. Euceda. It was created on his behalf by Sandy Robertson, a trained medical scribe. The creation of this document is based the provider's statements to the medical scribe.  Sandy Robertson, 2:52 PM         Signed Electronically by: Navin Euceda MD

## 2025-07-15 DIAGNOSIS — F41.9 ANXIETY: ICD-10-CM

## (undated) DEVICE — SOL WATER IRRIG 1000ML BOTTLE 07139-09

## (undated) RX ORDER — SIMETHICONE 40MG/0.6ML
SUSPENSION, DROPS(FINAL DOSAGE FORM)(ML) ORAL
Status: DISPENSED
Start: 2018-07-09

## (undated) RX ORDER — LIDOCAINE HYDROCHLORIDE 10 MG/ML
INJECTION, SOLUTION EPIDURAL; INFILTRATION; INTRACAUDAL; PERINEURAL
Status: DISPENSED
Start: 2023-06-28

## (undated) RX ORDER — HEPARIN SODIUM 1000 [USP'U]/ML
INJECTION, SOLUTION INTRAVENOUS; SUBCUTANEOUS
Status: DISPENSED
Start: 2023-06-28

## (undated) RX ORDER — FENTANYL CITRATE 50 UG/ML
INJECTION, SOLUTION INTRAMUSCULAR; INTRAVENOUS
Status: DISPENSED
Start: 2018-07-09

## (undated) RX ORDER — FENTANYL CITRATE 50 UG/ML
INJECTION, SOLUTION INTRAMUSCULAR; INTRAVENOUS
Status: DISPENSED
Start: 2023-06-28

## (undated) RX ORDER — NITROGLYCERIN 5 MG/ML
VIAL (ML) INTRAVENOUS
Status: DISPENSED
Start: 2023-06-28

## (undated) RX ORDER — SODIUM CHLORIDE 9 MG/ML
INJECTION, SOLUTION INTRAVENOUS
Status: DISPENSED
Start: 2023-06-28

## (undated) RX ORDER — VERAPAMIL HYDROCHLORIDE 2.5 MG/ML
INJECTION, SOLUTION INTRAVENOUS
Status: DISPENSED
Start: 2023-06-28